# Patient Record
Sex: MALE | Race: WHITE | NOT HISPANIC OR LATINO | Employment: FULL TIME | URBAN - METROPOLITAN AREA
[De-identification: names, ages, dates, MRNs, and addresses within clinical notes are randomized per-mention and may not be internally consistent; named-entity substitution may affect disease eponyms.]

---

## 2019-12-11 ENCOUNTER — RECORDS - HEALTHEAST (OUTPATIENT)
Dept: LAB | Facility: CLINIC | Age: 34
End: 2019-12-11

## 2019-12-11 LAB
ALBUMIN SERPL-MCNC: 3.8 G/DL (ref 3.5–5)
ALP SERPL-CCNC: 128 U/L (ref 45–120)
ALT SERPL W P-5'-P-CCNC: 9 U/L (ref 0–45)
ANION GAP SERPL CALCULATED.3IONS-SCNC: 9 MMOL/L (ref 5–18)
AST SERPL W P-5'-P-CCNC: 17 U/L (ref 0–40)
BASOPHILS # BLD AUTO: 0.1 THOU/UL (ref 0–0.2)
BASOPHILS NFR BLD AUTO: 1 % (ref 0–2)
BILIRUB SERPL-MCNC: 0.3 MG/DL (ref 0–1)
BUN SERPL-MCNC: 12 MG/DL (ref 8–22)
CALCIUM SERPL-MCNC: 8.9 MG/DL (ref 8.5–10.5)
CHLORIDE BLD-SCNC: 107 MMOL/L (ref 98–107)
CHOLEST SERPL-MCNC: 145 MG/DL
CO2 SERPL-SCNC: 25 MMOL/L (ref 22–31)
CREAT SERPL-MCNC: 0.83 MG/DL (ref 0.7–1.3)
EOSINOPHIL # BLD AUTO: 0.2 THOU/UL (ref 0–0.4)
EOSINOPHIL NFR BLD AUTO: 3 % (ref 0–6)
ERYTHROCYTE [DISTWIDTH] IN BLOOD BY AUTOMATED COUNT: 14.1 % (ref 11–14.5)
FASTING STATUS PATIENT QL REPORTED: NO
GFR SERPL CREATININE-BSD FRML MDRD: >60 ML/MIN/1.73M2
GLUCOSE BLD-MCNC: 94 MG/DL (ref 70–125)
HCT VFR BLD AUTO: 40.6 % (ref 40–54)
HDLC SERPL-MCNC: 43 MG/DL
HGB BLD-MCNC: 12.4 G/DL (ref 14–18)
LDLC SERPL CALC-MCNC: 86 MG/DL
LYMPHOCYTES # BLD AUTO: 2.5 THOU/UL (ref 0.8–4.4)
LYMPHOCYTES NFR BLD AUTO: 34 % (ref 20–40)
MCH RBC QN AUTO: 25.9 PG (ref 27–34)
MCHC RBC AUTO-ENTMCNC: 30.5 G/DL (ref 32–36)
MCV RBC AUTO: 85 FL (ref 80–100)
MONOCYTES # BLD AUTO: 0.6 THOU/UL (ref 0–0.9)
MONOCYTES NFR BLD AUTO: 8 % (ref 2–10)
NEUTROPHILS # BLD AUTO: 4 THOU/UL (ref 2–7.7)
NEUTROPHILS NFR BLD AUTO: 54 % (ref 50–70)
PLATELET # BLD AUTO: 473 THOU/UL (ref 140–440)
PMV BLD AUTO: 10.1 FL (ref 8.5–12.5)
POTASSIUM BLD-SCNC: 4.1 MMOL/L (ref 3.5–5)
PROT SERPL-MCNC: 6.8 G/DL (ref 6–8)
RBC # BLD AUTO: 4.79 MILL/UL (ref 4.4–6.2)
SODIUM SERPL-SCNC: 141 MMOL/L (ref 136–145)
TRIGL SERPL-MCNC: 79 MG/DL
WBC: 7.4 THOU/UL (ref 4–11)

## 2023-02-16 ENCOUNTER — LAB REQUISITION (OUTPATIENT)
Dept: LAB | Facility: CLINIC | Age: 38
End: 2023-02-16

## 2023-02-16 LAB
ALBUMIN SERPL BCG-MCNC: 4 G/DL (ref 3.5–5.2)
ALBUMIN UR-MCNC: 20 MG/DL
ALP SERPL-CCNC: 107 U/L (ref 40–129)
ALT SERPL W P-5'-P-CCNC: 9 U/L (ref 10–50)
ANION GAP SERPL CALCULATED.3IONS-SCNC: 15 MMOL/L (ref 7–15)
APPEARANCE UR: CLEAR
AST SERPL W P-5'-P-CCNC: 13 U/L (ref 10–50)
BASOPHILS # BLD AUTO: 0.1 10E3/UL (ref 0–0.2)
BASOPHILS NFR BLD AUTO: 1 %
BILIRUB SERPL-MCNC: 0.3 MG/DL
BILIRUB UR QL STRIP: NEGATIVE
BUN SERPL-MCNC: 10.3 MG/DL (ref 6–20)
CALCIUM SERPL-MCNC: 9.2 MG/DL (ref 8.6–10)
CHLORIDE SERPL-SCNC: 98 MMOL/L (ref 98–107)
CHOLEST SERPL-MCNC: 142 MG/DL
COLOR UR AUTO: YELLOW
CREAT SERPL-MCNC: 1.09 MG/DL (ref 0.67–1.17)
DEPRECATED HCO3 PLAS-SCNC: 26 MMOL/L (ref 22–29)
EOSINOPHIL # BLD AUTO: 0.2 10E3/UL (ref 0–0.7)
EOSINOPHIL NFR BLD AUTO: 1 %
ERYTHROCYTE [DISTWIDTH] IN BLOOD BY AUTOMATED COUNT: 13.9 % (ref 10–15)
ERYTHROCYTE [SEDIMENTATION RATE] IN BLOOD BY WESTERGREN METHOD: 26 MM/HR (ref 0–15)
GFR SERPL CREATININE-BSD FRML MDRD: 90 ML/MIN/1.73M2
GLUCOSE SERPL-MCNC: 90 MG/DL (ref 70–99)
GLUCOSE UR STRIP-MCNC: NEGATIVE MG/DL
HCT VFR BLD AUTO: 39.6 % (ref 40–53)
HDLC SERPL-MCNC: 43 MG/DL
HGB BLD-MCNC: 12 G/DL (ref 13.3–17.7)
HGB UR QL STRIP: NEGATIVE
IMM GRANULOCYTES # BLD: 0.1 10E3/UL
IMM GRANULOCYTES NFR BLD: 0 %
KETONES UR STRIP-MCNC: NEGATIVE MG/DL
LDLC SERPL CALC-MCNC: 82 MG/DL
LEUKOCYTE ESTERASE UR QL STRIP: NEGATIVE
LYMPHOCYTES # BLD AUTO: 2.3 10E3/UL (ref 0.8–5.3)
LYMPHOCYTES NFR BLD AUTO: 18 %
MCH RBC QN AUTO: 25.1 PG (ref 26.5–33)
MCHC RBC AUTO-ENTMCNC: 30.3 G/DL (ref 31.5–36.5)
MCV RBC AUTO: 83 FL (ref 78–100)
MONOCYTES # BLD AUTO: 1 10E3/UL (ref 0–1.3)
MONOCYTES NFR BLD AUTO: 8 %
MUCOUS THREADS #/AREA URNS LPF: PRESENT /LPF
NEUTROPHILS # BLD AUTO: 9.3 10E3/UL (ref 1.6–8.3)
NEUTROPHILS NFR BLD AUTO: 72 %
NITRATE UR QL: NEGATIVE
NONHDLC SERPL-MCNC: 99 MG/DL
NRBC # BLD AUTO: 0 10E3/UL
NRBC BLD AUTO-RTO: 0 /100
PH UR STRIP: 5.5 [PH] (ref 5–7)
PLATELET # BLD AUTO: 542 10E3/UL (ref 150–450)
POTASSIUM SERPL-SCNC: 4.2 MMOL/L (ref 3.4–5.3)
PROT SERPL-MCNC: 6.6 G/DL (ref 6.4–8.3)
RBC # BLD AUTO: 4.78 10E6/UL (ref 4.4–5.9)
RBC URINE: 1 /HPF
SODIUM SERPL-SCNC: 139 MMOL/L (ref 136–145)
SP GR UR STRIP: 1.02 (ref 1–1.03)
TRANSITIONAL EPI: <1 /HPF
TRIGL SERPL-MCNC: 86 MG/DL
UROBILINOGEN UR STRIP-MCNC: NORMAL MG/DL
WBC # BLD AUTO: 12.9 10E3/UL (ref 4–11)
WBC URINE: <1 /HPF

## 2023-02-16 PROCEDURE — 85652 RBC SED RATE AUTOMATED: CPT | Performed by: NURSE PRACTITIONER

## 2023-02-16 PROCEDURE — 80061 LIPID PANEL: CPT | Performed by: NURSE PRACTITIONER

## 2023-02-16 PROCEDURE — 80053 COMPREHEN METABOLIC PANEL: CPT | Performed by: NURSE PRACTITIONER

## 2023-02-16 PROCEDURE — 85004 AUTOMATED DIFF WBC COUNT: CPT | Performed by: NURSE PRACTITIONER

## 2023-02-16 PROCEDURE — 81003 URINALYSIS AUTO W/O SCOPE: CPT | Performed by: NURSE PRACTITIONER

## 2023-02-20 ENCOUNTER — TRANSFERRED RECORDS (OUTPATIENT)
Dept: HEALTH INFORMATION MANAGEMENT | Facility: CLINIC | Age: 38
End: 2023-02-20
Payer: COMMERCIAL

## 2023-02-21 ENCOUNTER — HOSPITAL ENCOUNTER (INPATIENT)
Facility: CLINIC | Age: 38
LOS: 4 days | Discharge: HOME | End: 2023-02-25
Attending: EMERGENCY MEDICINE | Admitting: STUDENT IN AN ORGANIZED HEALTH CARE EDUCATION/TRAINING PROGRAM
Payer: COMMERCIAL

## 2023-02-21 DIAGNOSIS — K37 APPENDICITIS, UNSPECIFIED APPENDICITIS TYPE: Primary | ICD-10-CM

## 2023-02-21 DIAGNOSIS — K35.31 ACUTE APPENDICITIS WITH LOCALIZED PERITONITIS AND GANGRENE, WITHOUT PERFORATION OR ABSCESS: ICD-10-CM

## 2023-02-21 LAB
ABO/RH(D): NORMAL
ALBUMIN SERPL BCG-MCNC: 4.1 G/DL (ref 3.5–5.2)
ALBUMIN UR-MCNC: NEGATIVE MG/DL
ALP SERPL-CCNC: 116 U/L (ref 40–129)
ALT SERPL W P-5'-P-CCNC: 9 U/L (ref 10–50)
ANION GAP SERPL CALCULATED.3IONS-SCNC: 11 MMOL/L (ref 7–15)
ANTIBODY SCREEN: NEGATIVE
APPEARANCE UR: CLEAR
AST SERPL W P-5'-P-CCNC: 17 U/L (ref 10–50)
BASOPHILS # BLD AUTO: 0.1 10E3/UL (ref 0–0.2)
BASOPHILS NFR BLD AUTO: 1 %
BILIRUB SERPL-MCNC: 0.2 MG/DL
BILIRUB UR QL STRIP: NEGATIVE
BUN SERPL-MCNC: 10.7 MG/DL (ref 6–20)
CALCIUM SERPL-MCNC: 9.6 MG/DL (ref 8.6–10)
CHLORIDE SERPL-SCNC: 101 MMOL/L (ref 98–107)
COLOR UR AUTO: NORMAL
CREAT SERPL-MCNC: 1 MG/DL (ref 0.67–1.17)
DEPRECATED HCO3 PLAS-SCNC: 28 MMOL/L (ref 22–29)
EOSINOPHIL # BLD AUTO: 0.2 10E3/UL (ref 0–0.7)
EOSINOPHIL NFR BLD AUTO: 2 %
ERYTHROCYTE [DISTWIDTH] IN BLOOD BY AUTOMATED COUNT: 13.4 % (ref 10–15)
GFR SERPL CREATININE-BSD FRML MDRD: >90 ML/MIN/1.73M2
GLUCOSE SERPL-MCNC: 102 MG/DL (ref 70–99)
GLUCOSE UR STRIP-MCNC: NEGATIVE MG/DL
HCT VFR BLD AUTO: 42.3 % (ref 40–53)
HGB BLD-MCNC: 13 G/DL (ref 13.3–17.7)
HGB UR QL STRIP: NEGATIVE
IMM GRANULOCYTES # BLD: 0 10E3/UL
IMM GRANULOCYTES NFR BLD: 0 %
KETONES UR STRIP-MCNC: NEGATIVE MG/DL
LEUKOCYTE ESTERASE UR QL STRIP: NEGATIVE
LIPASE SERPL-CCNC: 21 U/L (ref 13–60)
LYMPHOCYTES # BLD AUTO: 1.7 10E3/UL (ref 0.8–5.3)
LYMPHOCYTES NFR BLD AUTO: 22 %
MCH RBC QN AUTO: 25 PG (ref 26.5–33)
MCHC RBC AUTO-ENTMCNC: 30.7 G/DL (ref 31.5–36.5)
MCV RBC AUTO: 81 FL (ref 78–100)
MONOCYTES # BLD AUTO: 0.6 10E3/UL (ref 0–1.3)
MONOCYTES NFR BLD AUTO: 8 %
NEUTROPHILS # BLD AUTO: 5.1 10E3/UL (ref 1.6–8.3)
NEUTROPHILS NFR BLD AUTO: 67 %
NITRATE UR QL: NEGATIVE
NRBC # BLD AUTO: 0 10E3/UL
NRBC BLD AUTO-RTO: 0 /100
PH UR STRIP: 6.5 [PH] (ref 5–7)
PLATELET # BLD AUTO: 633 10E3/UL (ref 150–450)
POTASSIUM SERPL-SCNC: 4.8 MMOL/L (ref 3.4–5.3)
PROT SERPL-MCNC: 7.5 G/DL (ref 6.4–8.3)
RBC # BLD AUTO: 5.2 10E6/UL (ref 4.4–5.9)
RBC URINE: <1 /HPF
SODIUM SERPL-SCNC: 140 MMOL/L (ref 136–145)
SP GR UR STRIP: 1.02 (ref 1–1.03)
SPECIMEN EXPIRATION DATE: NORMAL
UROBILINOGEN UR STRIP-MCNC: NORMAL MG/DL
WBC # BLD AUTO: 7.6 10E3/UL (ref 4–11)
WBC URINE: 2 /HPF

## 2023-02-21 PROCEDURE — 99285 EMERGENCY DEPT VISIT HI MDM: CPT | Mod: 25 | Performed by: EMERGENCY MEDICINE

## 2023-02-21 PROCEDURE — 36415 COLL VENOUS BLD VENIPUNCTURE: CPT

## 2023-02-21 PROCEDURE — 258N000003 HC RX IP 258 OP 636: Performed by: EMERGENCY MEDICINE

## 2023-02-21 PROCEDURE — 83690 ASSAY OF LIPASE: CPT | Performed by: EMERGENCY MEDICINE

## 2023-02-21 PROCEDURE — 80053 COMPREHEN METABOLIC PANEL: CPT | Performed by: EMERGENCY MEDICINE

## 2023-02-21 PROCEDURE — 250N000011 HC RX IP 250 OP 636: Performed by: EMERGENCY MEDICINE

## 2023-02-21 PROCEDURE — 250N000011 HC RX IP 250 OP 636

## 2023-02-21 PROCEDURE — 96361 HYDRATE IV INFUSION ADD-ON: CPT | Performed by: EMERGENCY MEDICINE

## 2023-02-21 PROCEDURE — 81001 URINALYSIS AUTO W/SCOPE: CPT | Performed by: EMERGENCY MEDICINE

## 2023-02-21 PROCEDURE — 86901 BLOOD TYPING SEROLOGIC RH(D): CPT | Performed by: SURGERY

## 2023-02-21 PROCEDURE — 96374 THER/PROPH/DIAG INJ IV PUSH: CPT | Performed by: EMERGENCY MEDICINE

## 2023-02-21 PROCEDURE — 85025 COMPLETE CBC W/AUTO DIFF WBC: CPT | Performed by: EMERGENCY MEDICINE

## 2023-02-21 PROCEDURE — 36415 COLL VENOUS BLD VENIPUNCTURE: CPT | Performed by: EMERGENCY MEDICINE

## 2023-02-21 PROCEDURE — 250N000013 HC RX MED GY IP 250 OP 250 PS 637

## 2023-02-21 PROCEDURE — 99285 EMERGENCY DEPT VISIT HI MDM: CPT | Performed by: EMERGENCY MEDICINE

## 2023-02-21 PROCEDURE — 258N000003 HC RX IP 258 OP 636

## 2023-02-21 PROCEDURE — 120N000002 HC R&B MED SURG/OB UMMC

## 2023-02-21 RX ORDER — AMOXICILLIN 250 MG
2 CAPSULE ORAL 2 TIMES DAILY
Status: DISCONTINUED | OUTPATIENT
Start: 2023-02-21 | End: 2023-02-25 | Stop reason: HOSPADM

## 2023-02-21 RX ORDER — LIDOCAINE 40 MG/G
CREAM TOPICAL
Status: DISCONTINUED | OUTPATIENT
Start: 2023-02-21 | End: 2023-02-22

## 2023-02-21 RX ORDER — MORPHINE SULFATE 4 MG/ML
4 INJECTION, SOLUTION INTRAMUSCULAR; INTRAVENOUS
Status: DISCONTINUED | OUTPATIENT
Start: 2023-02-21 | End: 2023-02-21

## 2023-02-21 RX ORDER — AMOXICILLIN 250 MG
1 CAPSULE ORAL 2 TIMES DAILY
Status: DISCONTINUED | OUTPATIENT
Start: 2023-02-21 | End: 2023-02-25 | Stop reason: HOSPADM

## 2023-02-21 RX ORDER — ONDANSETRON 2 MG/ML
4 INJECTION INTRAMUSCULAR; INTRAVENOUS EVERY 30 MIN PRN
Status: DISCONTINUED | OUTPATIENT
Start: 2023-02-21 | End: 2023-02-21

## 2023-02-21 RX ORDER — OXYCODONE HYDROCHLORIDE 5 MG/1
5 TABLET ORAL EVERY 4 HOURS PRN
Status: DISCONTINUED | OUTPATIENT
Start: 2023-02-21 | End: 2023-02-24

## 2023-02-21 RX ORDER — ACETAMINOPHEN 650 MG/1
650 SUPPOSITORY RECTAL EVERY 6 HOURS PRN
Status: DISCONTINUED | OUTPATIENT
Start: 2023-02-21 | End: 2023-02-22

## 2023-02-21 RX ORDER — ONDANSETRON 2 MG/ML
4 INJECTION INTRAMUSCULAR; INTRAVENOUS EVERY 6 HOURS PRN
Status: DISCONTINUED | OUTPATIENT
Start: 2023-02-21 | End: 2023-02-25 | Stop reason: HOSPADM

## 2023-02-21 RX ORDER — PROCHLORPERAZINE 25 MG
25 SUPPOSITORY, RECTAL RECTAL EVERY 12 HOURS PRN
Status: DISCONTINUED | OUTPATIENT
Start: 2023-02-21 | End: 2023-02-25 | Stop reason: HOSPADM

## 2023-02-21 RX ORDER — SODIUM CHLORIDE, SODIUM LACTATE, POTASSIUM CHLORIDE, CALCIUM CHLORIDE 600; 310; 30; 20 MG/100ML; MG/100ML; MG/100ML; MG/100ML
INJECTION, SOLUTION INTRAVENOUS CONTINUOUS
Status: DISCONTINUED | OUTPATIENT
Start: 2023-02-21 | End: 2023-02-23

## 2023-02-21 RX ORDER — ONDANSETRON 4 MG/1
4 TABLET, ORALLY DISINTEGRATING ORAL EVERY 6 HOURS PRN
Status: DISCONTINUED | OUTPATIENT
Start: 2023-02-21 | End: 2023-02-25 | Stop reason: HOSPADM

## 2023-02-21 RX ORDER — ACETAMINOPHEN 325 MG/1
650 TABLET ORAL EVERY 6 HOURS PRN
Status: DISCONTINUED | OUTPATIENT
Start: 2023-02-21 | End: 2023-02-22

## 2023-02-21 RX ORDER — CEFOXITIN 2 G/1
2 INJECTION, POWDER, FOR SOLUTION INTRAVENOUS EVERY 6 HOURS
Status: DISCONTINUED | OUTPATIENT
Start: 2023-02-21 | End: 2023-02-22

## 2023-02-21 RX ORDER — SODIUM CHLORIDE 9 MG/ML
INJECTION, SOLUTION INTRAVENOUS CONTINUOUS
Status: DISCONTINUED | OUTPATIENT
Start: 2023-02-21 | End: 2023-02-21

## 2023-02-21 RX ORDER — PROCHLORPERAZINE MALEATE 5 MG
10 TABLET ORAL EVERY 6 HOURS PRN
Status: DISCONTINUED | OUTPATIENT
Start: 2023-02-21 | End: 2023-02-25 | Stop reason: HOSPADM

## 2023-02-21 RX ADMIN — SODIUM CHLORIDE, POTASSIUM CHLORIDE, SODIUM LACTATE AND CALCIUM CHLORIDE: 600; 310; 30; 20 INJECTION, SOLUTION INTRAVENOUS at 15:27

## 2023-02-21 RX ADMIN — SODIUM CHLORIDE: 0.9 INJECTION, SOLUTION INTRAVENOUS at 13:27

## 2023-02-21 RX ADMIN — CEFOXITIN SODIUM 2 G: 2 POWDER, FOR SOLUTION INTRAVENOUS at 16:32

## 2023-02-21 RX ADMIN — ACETAMINOPHEN 650 MG: 325 TABLET ORAL at 22:02

## 2023-02-21 RX ADMIN — ONDANSETRON 4 MG: 2 INJECTION INTRAMUSCULAR; INTRAVENOUS at 11:47

## 2023-02-21 RX ADMIN — SODIUM CHLORIDE 1000 ML: 9 INJECTION, SOLUTION INTRAVENOUS at 11:46

## 2023-02-21 RX ADMIN — CEFOXITIN SODIUM 2 G: 2 POWDER, FOR SOLUTION INTRAVENOUS at 23:00

## 2023-02-21 ASSESSMENT — ACTIVITIES OF DAILY LIVING (ADL)
ADLS_ACUITY_SCORE: 35

## 2023-02-21 NOTE — H&P
[facility]    Consult Note - General Surgery Service  Date of Admission:  2/21/2023  Consult Requested by: ED  Reason for Consult: Abdominal pain    Assessment & Plan: Surgery   Karlos Terrazas is a 37 year old male otherwise healthy with acute appendicitis.     Patient was counselled on the various treatment options for appendicitis including surgery + antibiotics, antibiotic therapy alone, or conservative measures, including the risks and benefits of each. The patient expressed understanding and wished to proceed with surgical management.     - Admit to general surgery  - Board and consent for Lap Appy for 2/22.  - IV Abx (Cefoxitin)  - IVF  - NPO after midnight  - Reassess patient in the AM.        Drains: None     Code Status: Full Code      Clinically Significant Risk Factors Present on Admission                             The patient's care was discussed with the Attending Physician, Dr. Miranda and Chief Resident/Fellow.    Lázaro Strange MD  [facility]  Non-urgent messages: Securely message with [employer] (more info)  Text page via [employer] Paging/Directory     ______________________________________________________________________    Chief Complaint   Abdominal pain    History is obtained from the patient, electronic health record and emergency department physician    History of Present Illness   Karlos Terrazas is a 37 year old male otherwise healthy presenting with ~5 days of mid abdominal pain that migrated to his RLQ on 2/19. Patient initially presented to his PCP that day who obtained workup demonstrating mild leukocytosis and scheduled outpatient CT A/P with results demonstrating acute appendicitis. Patient was the encouraged to present to the ED for further evaluation.     Today, he continues to have mild RLQ abdominal pain that waxes and wanes, notably improved if he is not eating. Notes some chills but no fever.  No CP or SOB. Endorses nausea, but no vomiting. No changes in bowel or bladder habits.     Given his CT findings from 2/20, general surgery was asked to evaluate the patient for possible acute appendicitis.         Past Medical History    History reviewed. No pertinent past medical history.    Past Surgical History   History reviewed. No pertinent surgical history.    Prior to Admission Medications   I have reviewed this patient's current medications       Review of Systems    The 10 point Review of Systems is negative other than noted in the HPI or here.     Social History   I have reviewed this patient's social history and updated it with pertinent information if needed.  Social History     Tobacco Use     Smoking status: Never     Smokeless tobacco: Never   Substance Use Topics     Alcohol use: Yes     Comment: occasional use     Drug use: Yes     Types: Marijuana       Family History   No significant family history, including no history of: IBS, Ulcerative colitis, or crohn's.     Allergies   No Known Allergies     Physical Exam   Vital Signs: Temp: 98.3  F (36.8  C) Temp src: Oral BP: 117/78 Pulse: 70   Resp: 14 SpO2: 100 % O2 Device: None (Room air)    Weight: 0 lbs 0 oz    Intake/Output Summary (Last 24 hours) at 2/21/2023 1654  Last data filed at 2/21/2023 1600  Gross per 24 hour   Intake 517.08 ml   Output --   Net 517.08 ml     General: Alert, resting comfortably in NAD/NTA. Cooperative  HEENT: NC/AT, sclerae anicteric.Oral mucosa moist  Neck: Trachea midline  Pulm: NLB on RA, no tachypnea/dyspnea  CV: RRR by RP, non-cyanotic, no LE edema.  ABD: Soft, Thin, non-tender, non-distended, no guarding or rebound tenderness. No obvious organomegaly.   Extrem: MA4E, no obvious deformities  Neuro:  A/Ox3, NFD,   Skin: Warm and well perfused    Data     I have personally reviewed the following data over the past 24 hrs:    7.6  \   13.0 (L)   / 633 (H)     140 101 10.7 /  102 (H)   4.8 28 1.00 \       ALT: 9 (L)  AST: 17 AP: 116 TBILI: 0.2   ALB: 4.1 TOT PROTEIN: 7.5 LIPASE: 21       Imaging results reviewed over the past 24 hrs:   Patient's CT A/P from 2/20 unavailable - read was discussed with [facility] resident and attending who confirmed the inflammation of the appendix w/ dilation to ~1 cm

## 2023-02-21 NOTE — ED TRIAGE NOTES
Pt reports being seen at [facility] yesterday and having CT scan done, was told today he has Acute Appendicitis. Pt reports periumbilical and RLQ pain for past 1.5 months but states worse since last Thursday. Mild nausea but no emesis, intermittant diarrhea.      Triage Assessment     Row Name 02/21/23 1053       Triage Assessment (Adult)    Airway WDL WDL       Respiratory WDL    Respiratory WDL WDL       Skin Circulation/Temperature WDL    Skin Circulation/Temperature WDL WDL       Cardiac WDL    Cardiac WDL WDL       Peripheral/Neurovascular WDL    Peripheral Neurovascular WDL WDL       Cognitive/Neuro/Behavioral WDL    Cognitive/Neuro/Behavioral WDL WDL

## 2023-02-21 NOTE — ED PROVIDER NOTES
"ED Provider Note  [facility]      History   No chief complaint on file.    The history is provided by the patient and medical records.     Karlos Terrazas is a relatively healthy 37 year old male who presents to the ED by recommendation of his PCP for evaluation of abdominal pain in the setting of CT verified appendicitis.  Patient reports having intermittent abdominal pain and distention since John.  On Thursday, 5 days ago, patient reported worsening pain in his abdomen, specifically his right lower quadrant and visited his PCP.  Labs and CT were ordered; CT was conducted yesterday at [facility] in [place] ([phone]) which was notable for \"acute appendicitis without perforation or abscess; thickening of the wall with trace fluid; some adenopathy in right lower quadrant abdomen.\"  Over the past several days the patient notes his pain has moved to different areas of his abdomen but states worst pain is  periumbilical.  His pain worsens with palpation.  Patient notes feeling slightly nauseous for the past several days and denies episodes of vomiting.  Patient endorses a decreased appetite.  Patient has been able to eat some food but less than what is typical for him.  Patient denies recent fevers.  He denies issues with urinating.  Patient last ate food yesterday.  He drank approximately 6 ounces of coffee in the a.m. and 6 ounces of milk at 10 AM this morning.  He denies a history of abdominal surgeries.  Patient denies recent antibiotics or immunosuppressants.    Past Medical History  History reviewed. No pertinent past medical history.  History reviewed. No pertinent surgical history.  No current outpatient medications on file.    No Known Allergies  Family History  History reviewed. No pertinent family history.  Social History   Social History     Tobacco Use     Smoking status: Never     Smokeless tobacco: Never   Substance Use Topics     Alcohol use: Yes     " Comment: occasional use     Drug use: Yes     Types: Marijuana      Past medical history, past surgical history, medications, allergies, family history, and social history were reviewed with the patient. No additional pertinent items.      A complete review of systems was performed with pertinent positives and negatives noted in the HPI, and all other systems negative.    Physical Exam      Physical Exam  Constitutional:       General: He is not in acute distress.     Appearance: He is well-developed. He is not ill-appearing, toxic-appearing or diaphoretic.   HENT:      Head: Normocephalic and atraumatic.   Cardiovascular:      Rate and Rhythm: Normal rate and regular rhythm.      Heart sounds: Normal heart sounds.   Pulmonary:      Effort: Pulmonary effort is normal. No respiratory distress.      Breath sounds: No wheezing.   Abdominal:      General: There is no distension.      Palpations: Abdomen is soft.      Tenderness: There is abdominal tenderness in the right lower quadrant and suprapubic area. There is no rebound.   Musculoskeletal:      Cervical back: Normal range of motion and neck supple.   Skin:     General: Skin is warm.   Neurological:      Mental Status: He is alert and oriented to person, place, and time.   Psychiatric:         Mood and Affect: Mood normal.         Behavior: Behavior normal.         Thought Content: Thought content normal.           ED Course, Procedures, & Data     11:21 AM  The patient was seen and examined by Dr. Thea Roman in Room [facility].     Procedures                      No results found for any visits on 02/21/23.  Medications - No data to display  Labs Ordered and Resulted from Time of ED Arrival to Time of ED Departure - No data to display  No orders to display              Medical Decision Making  The patient's presentation was of moderate complexity (an acute illness with systemic symptoms).    The patient's evaluation involved:  review of external note(s) from 1 sources  (see separate area of note for details)  ordering and/or review of 3+ test(s) in this encounter (see separate area of note for details)  review of 1 test result(s) ordered prior to this encounter (CT abd results from yesterday reveiwed )  discussion of management or test interpretation with another health professional (surgery team)    The patient's management necessitated high risk (a decision regarding emergency major procedure (went to the OR with operative service)) and high risk (a decision regarding hospitalization).      Assessment & Plan    Patient is a 37-year-old male who was sent to the ER for evaluation for concern for acute appendicitis.  Patient had worsening pain started 5 days ago.  Patient's labs are stable.  Patient's CT was reviewed from yesterday.  Patient's CT abdomen and pelvis confirms appendicitis.  Patient does have tenderness in right lower quadrant.  Patient was seen by the surgery team who recommends admission to their service with plan for appendectomy.    I have reviewed the nursing notes. I have reviewed the findings, diagnosis, plan and need for follow up with the patient.    New Prescriptions    No medications on file       Final diagnoses:   Acute appendicitis with localized peritonitis and gangrene, without perforation or abscess     I, Alvaro Mcdaniel, am serving as a trained medical scribe to document services personally performed by Thea Roman MD, based on the provider's statements to me.      IThea MD, was physically present and have reviewed and verified the accuracy of this note documented by Alvaro Mcdaniel.     [facility] EMERGENCY DEPARTMENT  2/21/2023     Thea Roman MD  02/21/23 0487

## 2023-02-22 ENCOUNTER — ANESTHESIA (OUTPATIENT)
Dept: SURGERY | Facility: CLINIC | Age: 38
End: 2023-02-22
Payer: COMMERCIAL

## 2023-02-22 ENCOUNTER — ANESTHESIA EVENT (OUTPATIENT)
Dept: SURGERY | Facility: CLINIC | Age: 38
End: 2023-02-22
Payer: COMMERCIAL

## 2023-02-22 LAB
ERYTHROCYTE [DISTWIDTH] IN BLOOD BY AUTOMATED COUNT: 13.3 % (ref 10–15)
ERYTHROCYTE [DISTWIDTH] IN BLOOD BY AUTOMATED COUNT: 13.4 % (ref 10–15)
GLUCOSE BLDC GLUCOMTR-MCNC: 77 MG/DL (ref 70–99)
HCT VFR BLD AUTO: 35.8 % (ref 40–53)
HCT VFR BLD AUTO: 37.6 % (ref 40–53)
HGB BLD-MCNC: 10.9 G/DL (ref 13.3–17.7)
HGB BLD-MCNC: 11.3 G/DL (ref 13.3–17.7)
MCH RBC QN AUTO: 24.7 PG (ref 26.5–33)
MCH RBC QN AUTO: 24.9 PG (ref 26.5–33)
MCHC RBC AUTO-ENTMCNC: 30.1 G/DL (ref 31.5–36.5)
MCHC RBC AUTO-ENTMCNC: 30.4 G/DL (ref 31.5–36.5)
MCV RBC AUTO: 82 FL (ref 78–100)
MCV RBC AUTO: 82 FL (ref 78–100)
PLATELET # BLD AUTO: 508 10E3/UL (ref 150–450)
PLATELET # BLD AUTO: 567 10E3/UL (ref 150–450)
RBC # BLD AUTO: 4.37 10E6/UL (ref 4.4–5.9)
RBC # BLD AUTO: 4.57 10E6/UL (ref 4.4–5.9)
WBC # BLD AUTO: 14.9 10E3/UL (ref 4–11)
WBC # BLD AUTO: 17.8 10E3/UL (ref 4–11)

## 2023-02-22 PROCEDURE — 250N000011 HC RX IP 250 OP 636

## 2023-02-22 PROCEDURE — 120N000002 HC R&B MED SURG/OB UMMC

## 2023-02-22 PROCEDURE — 0WJG4ZZ INSPECTION OF PERITONEAL CAVITY, PERCUTANEOUS ENDOSCOPIC APPROACH: ICD-10-PCS | Performed by: STUDENT IN AN ORGANIZED HEALTH CARE EDUCATION/TRAINING PROGRAM

## 2023-02-22 PROCEDURE — 999N000141 HC STATISTIC PRE-PROCEDURE NURSING ASSESSMENT: Performed by: STUDENT IN AN ORGANIZED HEALTH CARE EDUCATION/TRAINING PROGRAM

## 2023-02-22 PROCEDURE — 272N000001 HC OR GENERAL SUPPLY STERILE: Performed by: STUDENT IN AN ORGANIZED HEALTH CARE EDUCATION/TRAINING PROGRAM

## 2023-02-22 PROCEDURE — 250N000011 HC RX IP 250 OP 636: Performed by: ANESTHESIOLOGY

## 2023-02-22 PROCEDURE — 250N000025 HC SEVOFLURANE, PER MIN: Performed by: STUDENT IN AN ORGANIZED HEALTH CARE EDUCATION/TRAINING PROGRAM

## 2023-02-22 PROCEDURE — 710N000010 HC RECOVERY PHASE 1, LEVEL 2, PER MIN: Performed by: STUDENT IN AN ORGANIZED HEALTH CARE EDUCATION/TRAINING PROGRAM

## 2023-02-22 PROCEDURE — 250N000013 HC RX MED GY IP 250 OP 250 PS 637

## 2023-02-22 PROCEDURE — 36415 COLL VENOUS BLD VENIPUNCTURE: CPT

## 2023-02-22 PROCEDURE — 0W9G40Z DRAINAGE OF PERITONEAL CAVITY WITH DRAINAGE DEVICE, PERCUTANEOUS ENDOSCOPIC APPROACH: ICD-10-PCS | Performed by: STUDENT IN AN ORGANIZED HEALTH CARE EDUCATION/TRAINING PROGRAM

## 2023-02-22 PROCEDURE — 258N000003 HC RX IP 258 OP 636

## 2023-02-22 PROCEDURE — 44180 LAP ENTEROLYSIS: CPT | Mod: GC | Performed by: STUDENT IN AN ORGANIZED HEALTH CARE EDUCATION/TRAINING PROGRAM

## 2023-02-22 PROCEDURE — 85027 COMPLETE CBC AUTOMATED: CPT

## 2023-02-22 PROCEDURE — 250N000011 HC RX IP 250 OP 636: Performed by: STUDENT IN AN ORGANIZED HEALTH CARE EDUCATION/TRAINING PROGRAM

## 2023-02-22 PROCEDURE — 0DNJ4ZZ RELEASE APPENDIX, PERCUTANEOUS ENDOSCOPIC APPROACH: ICD-10-PCS | Performed by: STUDENT IN AN ORGANIZED HEALTH CARE EDUCATION/TRAINING PROGRAM

## 2023-02-22 PROCEDURE — 370N000017 HC ANESTHESIA TECHNICAL FEE, PER MIN: Performed by: STUDENT IN AN ORGANIZED HEALTH CARE EDUCATION/TRAINING PROGRAM

## 2023-02-22 PROCEDURE — 360N000076 HC SURGERY LEVEL 3, PER MIN: Performed by: STUDENT IN AN ORGANIZED HEALTH CARE EDUCATION/TRAINING PROGRAM

## 2023-02-22 PROCEDURE — 250N000009 HC RX 250: Performed by: ANESTHESIOLOGY

## 2023-02-22 PROCEDURE — 0W3P4ZZ CONTROL BLEEDING IN GASTROINTESTINAL TRACT, PERCUTANEOUS ENDOSCOPIC APPROACH: ICD-10-PCS | Performed by: STUDENT IN AN ORGANIZED HEALTH CARE EDUCATION/TRAINING PROGRAM

## 2023-02-22 RX ORDER — HYDROMORPHONE HCL IN WATER/PF 6 MG/30 ML
.2-.4 PATIENT CONTROLLED ANALGESIA SYRINGE INTRAVENOUS EVERY 4 HOURS PRN
Status: DISCONTINUED | OUTPATIENT
Start: 2023-02-22 | End: 2023-02-24

## 2023-02-22 RX ORDER — ONDANSETRON 2 MG/ML
4 INJECTION INTRAMUSCULAR; INTRAVENOUS EVERY 30 MIN PRN
Status: DISCONTINUED | OUTPATIENT
Start: 2023-02-22 | End: 2023-02-22 | Stop reason: HOSPADM

## 2023-02-22 RX ORDER — FENTANYL CITRATE 50 UG/ML
25 INJECTION, SOLUTION INTRAMUSCULAR; INTRAVENOUS EVERY 5 MIN PRN
Status: DISCONTINUED | OUTPATIENT
Start: 2023-02-22 | End: 2023-02-22 | Stop reason: HOSPADM

## 2023-02-22 RX ORDER — CEFTRIAXONE 1 G/1
1 INJECTION, POWDER, FOR SOLUTION INTRAMUSCULAR; INTRAVENOUS EVERY 24 HOURS
Status: DISCONTINUED | OUTPATIENT
Start: 2023-02-22 | End: 2023-02-22

## 2023-02-22 RX ORDER — ONDANSETRON 4 MG/1
4 TABLET, ORALLY DISINTEGRATING ORAL EVERY 30 MIN PRN
Status: DISCONTINUED | OUTPATIENT
Start: 2023-02-22 | End: 2023-02-22 | Stop reason: HOSPADM

## 2023-02-22 RX ORDER — ACETAMINOPHEN 325 MG/1
975 TABLET ORAL EVERY 4 HOURS PRN
Status: DISCONTINUED | OUTPATIENT
Start: 2023-02-22 | End: 2023-02-23

## 2023-02-22 RX ORDER — METRONIDAZOLE 500 MG/100ML
500 INJECTION, SOLUTION INTRAVENOUS EVERY 8 HOURS
Status: DISCONTINUED | OUTPATIENT
Start: 2023-02-22 | End: 2023-02-24

## 2023-02-22 RX ORDER — LIDOCAINE HYDROCHLORIDE 20 MG/ML
INJECTION, SOLUTION INFILTRATION; PERINEURAL PRN
Status: DISCONTINUED | OUTPATIENT
Start: 2023-02-22 | End: 2023-02-22

## 2023-02-22 RX ORDER — CEFTRIAXONE 2 G/1
2 INJECTION, POWDER, FOR SOLUTION INTRAMUSCULAR; INTRAVENOUS EVERY 24 HOURS
Status: DISCONTINUED | OUTPATIENT
Start: 2023-02-22 | End: 2023-02-24

## 2023-02-22 RX ORDER — LIDOCAINE 40 MG/G
CREAM TOPICAL
Status: DISCONTINUED | OUTPATIENT
Start: 2023-02-22 | End: 2023-02-25 | Stop reason: HOSPADM

## 2023-02-22 RX ORDER — FENTANYL CITRATE 50 UG/ML
INJECTION, SOLUTION INTRAMUSCULAR; INTRAVENOUS PRN
Status: DISCONTINUED | OUTPATIENT
Start: 2023-02-22 | End: 2023-02-22

## 2023-02-22 RX ORDER — FENTANYL CITRATE 50 UG/ML
50 INJECTION, SOLUTION INTRAMUSCULAR; INTRAVENOUS EVERY 5 MIN PRN
Status: DISCONTINUED | OUTPATIENT
Start: 2023-02-22 | End: 2023-02-22 | Stop reason: HOSPADM

## 2023-02-22 RX ORDER — NALOXONE HYDROCHLORIDE 0.4 MG/ML
0.4 INJECTION, SOLUTION INTRAMUSCULAR; INTRAVENOUS; SUBCUTANEOUS
Status: DISCONTINUED | OUTPATIENT
Start: 2023-02-22 | End: 2023-02-25 | Stop reason: HOSPADM

## 2023-02-22 RX ORDER — HYDROMORPHONE HCL IN WATER/PF 6 MG/30 ML
0.2 PATIENT CONTROLLED ANALGESIA SYRINGE INTRAVENOUS EVERY 5 MIN PRN
Status: DISCONTINUED | OUTPATIENT
Start: 2023-02-22 | End: 2023-02-22 | Stop reason: HOSPADM

## 2023-02-22 RX ORDER — BUPIVACAINE HYDROCHLORIDE 2.5 MG/ML
INJECTION, SOLUTION EPIDURAL; INFILTRATION; INTRACAUDAL PRN
Status: DISCONTINUED | OUTPATIENT
Start: 2023-02-22 | End: 2023-02-22 | Stop reason: HOSPADM

## 2023-02-22 RX ORDER — HYDROMORPHONE HCL IN WATER/PF 6 MG/30 ML
0.4 PATIENT CONTROLLED ANALGESIA SYRINGE INTRAVENOUS EVERY 5 MIN PRN
Status: DISCONTINUED | OUTPATIENT
Start: 2023-02-22 | End: 2023-02-22 | Stop reason: HOSPADM

## 2023-02-22 RX ORDER — SODIUM CHLORIDE, SODIUM LACTATE, POTASSIUM CHLORIDE, CALCIUM CHLORIDE 600; 310; 30; 20 MG/100ML; MG/100ML; MG/100ML; MG/100ML
INJECTION, SOLUTION INTRAVENOUS CONTINUOUS
Status: DISCONTINUED | OUTPATIENT
Start: 2023-02-22 | End: 2023-02-22 | Stop reason: HOSPADM

## 2023-02-22 RX ORDER — LIDOCAINE 4 G/G
1 PATCH TOPICAL
Status: DISCONTINUED | OUTPATIENT
Start: 2023-02-22 | End: 2023-02-25 | Stop reason: HOSPADM

## 2023-02-22 RX ORDER — CEFAZOLIN SODIUM 1 G/3ML
INJECTION, POWDER, FOR SOLUTION INTRAMUSCULAR; INTRAVENOUS PRN
Status: DISCONTINUED | OUTPATIENT
Start: 2023-02-22 | End: 2023-02-22

## 2023-02-22 RX ORDER — ONDANSETRON 2 MG/ML
INJECTION INTRAMUSCULAR; INTRAVENOUS PRN
Status: DISCONTINUED | OUTPATIENT
Start: 2023-02-22 | End: 2023-02-22

## 2023-02-22 RX ORDER — DEXAMETHASONE SODIUM PHOSPHATE 4 MG/ML
INJECTION, SOLUTION INTRA-ARTICULAR; INTRALESIONAL; INTRAMUSCULAR; INTRAVENOUS; SOFT TISSUE PRN
Status: DISCONTINUED | OUTPATIENT
Start: 2023-02-22 | End: 2023-02-22

## 2023-02-22 RX ORDER — NALOXONE HYDROCHLORIDE 0.4 MG/ML
0.2 INJECTION, SOLUTION INTRAMUSCULAR; INTRAVENOUS; SUBCUTANEOUS
Status: DISCONTINUED | OUTPATIENT
Start: 2023-02-22 | End: 2023-02-25 | Stop reason: HOSPADM

## 2023-02-22 RX ORDER — METHOCARBAMOL 500 MG/1
500 TABLET, FILM COATED ORAL 3 TIMES DAILY PRN
Status: DISCONTINUED | OUTPATIENT
Start: 2023-02-22 | End: 2023-02-25 | Stop reason: HOSPADM

## 2023-02-22 RX ORDER — ACETAMINOPHEN 650 MG/1
650 SUPPOSITORY RECTAL EVERY 6 HOURS PRN
Status: DISCONTINUED | OUTPATIENT
Start: 2023-02-22 | End: 2023-02-23

## 2023-02-22 RX ORDER — PROPOFOL 10 MG/ML
INJECTION, EMULSION INTRAVENOUS PRN
Status: DISCONTINUED | OUTPATIENT
Start: 2023-02-22 | End: 2023-02-22

## 2023-02-22 RX ADMIN — FENTANYL CITRATE 50 MCG: 50 INJECTION, SOLUTION INTRAMUSCULAR; INTRAVENOUS at 10:39

## 2023-02-22 RX ADMIN — METRONIDAZOLE 500 MG: 500 INJECTION, SOLUTION INTRAVENOUS at 11:49

## 2023-02-22 RX ADMIN — LIDOCAINE PATCH 4% 1 PATCH: 40 PATCH TOPICAL at 23:02

## 2023-02-22 RX ADMIN — HYDROMORPHONE HYDROCHLORIDE 0.4 MG: 0.2 INJECTION, SOLUTION INTRAMUSCULAR; INTRAVENOUS; SUBCUTANEOUS at 11:47

## 2023-02-22 RX ADMIN — FENTANYL CITRATE 50 MCG: 50 INJECTION, SOLUTION INTRAMUSCULAR; INTRAVENOUS at 07:56

## 2023-02-22 RX ADMIN — LIDOCAINE HYDROCHLORIDE 60 MG: 20 INJECTION, SOLUTION INFILTRATION; PERINEURAL at 07:36

## 2023-02-22 RX ADMIN — SUGAMMADEX 200 MG: 100 INJECTION, SOLUTION INTRAVENOUS at 09:46

## 2023-02-22 RX ADMIN — CEFTRIAXONE SODIUM 2 G: 2 INJECTION, POWDER, FOR SOLUTION INTRAMUSCULAR; INTRAVENOUS at 13:49

## 2023-02-22 RX ADMIN — ONDANSETRON 4 MG: 2 INJECTION INTRAMUSCULAR; INTRAVENOUS at 14:05

## 2023-02-22 RX ADMIN — SODIUM CHLORIDE, POTASSIUM CHLORIDE, SODIUM LACTATE AND CALCIUM CHLORIDE: 600; 310; 30; 20 INJECTION, SOLUTION INTRAVENOUS at 08:20

## 2023-02-22 RX ADMIN — SODIUM CHLORIDE, POTASSIUM CHLORIDE, SODIUM LACTATE AND CALCIUM CHLORIDE: 600; 310; 30; 20 INJECTION, SOLUTION INTRAVENOUS at 16:59

## 2023-02-22 RX ADMIN — Medication 10 MG: at 07:56

## 2023-02-22 RX ADMIN — Medication 50 MG: at 07:36

## 2023-02-22 RX ADMIN — ACETAMINOPHEN 650 MG: 325 TABLET ORAL at 12:35

## 2023-02-22 RX ADMIN — Medication 10 MG: at 08:43

## 2023-02-22 RX ADMIN — ONDANSETRON 4 MG: 2 INJECTION INTRAMUSCULAR; INTRAVENOUS at 09:27

## 2023-02-22 RX ADMIN — Medication 10 MG: at 08:21

## 2023-02-22 RX ADMIN — DEXAMETHASONE SODIUM PHOSPHATE 10 MG: 4 INJECTION, SOLUTION INTRA-ARTICULAR; INTRALESIONAL; INTRAMUSCULAR; INTRAVENOUS; SOFT TISSUE at 07:53

## 2023-02-22 RX ADMIN — ACETAMINOPHEN 975 MG: 325 TABLET ORAL at 23:01

## 2023-02-22 RX ADMIN — SODIUM CHLORIDE, POTASSIUM CHLORIDE, SODIUM LACTATE AND CALCIUM CHLORIDE: 600; 310; 30; 20 INJECTION, SOLUTION INTRAVENOUS at 02:45

## 2023-02-22 RX ADMIN — METHOCARBAMOL 500 MG: 500 TABLET ORAL at 18:26

## 2023-02-22 RX ADMIN — MIDAZOLAM 2 MG: 1 INJECTION INTRAMUSCULAR; INTRAVENOUS at 07:17

## 2023-02-22 RX ADMIN — ACETAMINOPHEN 650 MG: 325 TABLET ORAL at 18:26

## 2023-02-22 RX ADMIN — FENTANYL CITRATE 50 MCG: 50 INJECTION, SOLUTION INTRAMUSCULAR; INTRAVENOUS at 07:35

## 2023-02-22 RX ADMIN — OXYCODONE HYDROCHLORIDE 5 MG: 5 TABLET ORAL at 12:35

## 2023-02-22 RX ADMIN — CEFOXITIN SODIUM 2 G: 2 POWDER, FOR SOLUTION INTRAVENOUS at 06:07

## 2023-02-22 RX ADMIN — PROPOFOL 120 MG: 10 INJECTION, EMULSION INTRAVENOUS at 07:36

## 2023-02-22 RX ADMIN — FENTANYL CITRATE 50 MCG: 50 INJECTION, SOLUTION INTRAMUSCULAR; INTRAVENOUS at 08:21

## 2023-02-22 RX ADMIN — CEFAZOLIN 2 G: 1 INJECTION, POWDER, FOR SOLUTION INTRAMUSCULAR; INTRAVENOUS at 07:31

## 2023-02-22 RX ADMIN — METRONIDAZOLE 500 MG: 500 INJECTION, SOLUTION INTRAVENOUS at 20:44

## 2023-02-22 ASSESSMENT — ACTIVITIES OF DAILY LIVING (ADL)
ADLS_ACUITY_SCORE: 35
ADLS_ACUITY_SCORE: 39
ADLS_ACUITY_SCORE: 35
ADLS_ACUITY_SCORE: 39
ADLS_ACUITY_SCORE: 35
ADLS_ACUITY_SCORE: 35
ADLS_ACUITY_SCORE: 39
ADLS_ACUITY_SCORE: 35
ADLS_ACUITY_SCORE: 39
ADLS_ACUITY_SCORE: 35

## 2023-02-22 NOTE — BRIEF OP NOTE
[facility]    Brief Operative Note    Pre-operative diagnosis: Appendicitis, unspecified appendicitis type [K37]  Post-operative diagnosis Same as pre-operative diagnosis    Procedure: Procedure(s):  DIAGNOSTIC LAPAROSCOPY, LYSIS OF ADHESION, DRAIN PLACEMENT  Surgeon: Surgeon(s) and Role:     * Fabiola Miranda MD - Primary     * Shantell Anguiano MD - Resident - Assisting  Lázaro Strange MD - Resident - Assisting  Anesthesia: General   Estimated Blood Loss: Less than 50 ml    Drains: Indra-White  Specimens: * No specimens in log *  Findings:   Significant entrapment of the appendectomy posterior to the terminal ileum. Some bleeding was encountered from the ileal mesentery though this was controlled with hitesh and pressure. Unable to safely expose the appendix safely for removal. OLEKSANDR drain was placedin the RLQ and the case was aborted. .  Complications: Unanticipated bleeding from ileal mesentery which was controled with pressure and hitesh.  This was nottreated with transfusion of PRBC's.  Implants: * No implants in log *    Please page if questions,    Lázaro Strange MD, MS  PGY-2, General Surgery

## 2023-02-22 NOTE — PLAN OF CARE
Discussed with patient the current findings and explained the importance of having an appendectomy given his clinical and imaging findings and physical exam.     Patient was also given the ACS Appendectomy packet that illustrated pictures for additional understanding/guidance.     Non operative measures were offered and described to the patient, but they declined that approach.    I have also informed the patient of the potential for the inability to perform the intended operation and proceed with any of the following: open appendectomy, or only diagnostic laparoscopy with aborted appendectomy.    Informed the patient of the risks of operation that include but are not limited to:  Bleeding, deep vein thrombosis, seroma formation, chronic pain, pain/opioid medicine addiction (heroin addiction), misdiagnosis, possible drain placement, post operative pain, scar formation, infection, hernia formation, bladder or urachus injury, superficial or deep or intra abdominal abscess formation at or near any organs, possible hernia formation, need for drain placement, need for additional scans, staple dehiscence, suture tear, stump appendicitis in the future, conversion to open operation, fistula formation, need for extended resection of small bowel and/or cecum or right colon, leaks from staple lines or repair lines or ligation sites, need for additional surgeries, possible cancer diagnosis, possible return to the operating room emergently, Inflammatory Bowel Disease flare, increased or decreased risk for CD or UC, small and large bowel perforation/injury that may result in further complications and possible colostomy/ileostomy creation, ureter injury that would cause problems/necrosis of the ipsilateral kidney, large arterial injury (such as external iliac artery or vein) that would compromise his vasculature to that extremity which could result in morbid complications, strokes, brain anoxia, heart attack/myocardial  infarction, pneumonia, death from operation/anesthesia or from previously stated complications, etc.    I have also provided and explained to the patient other alternatives to surgical intervention.     Patient was allowed and questioned if they had any inquiry, questions, or concerns that they would to bring up or address, all of which were answered respectfully.     Patient voiced understanding of all this, potential for additional unlisted complications, and wished to proceed with intended operation.     Fabiola Miranda MD

## 2023-02-22 NOTE — ANESTHESIA POSTPROCEDURE EVALUATION
Patient: Karlos Terrazas    Procedure: Procedure(s):  DIAGNOSTIC LAPAROSCOPY, LYSIS OF ADHESION, DRAIN PLACEMENT       Anesthesia Type:  General    Note:  Disposition: Inpatient   Postop Pain Control: Uneventful            Sign Out: Well controlled pain   PONV: No   Neuro/Psych: Uneventful            Sign Out: Acceptable/Baseline neuro status   Airway/Respiratory: Uneventful            Sign Out: Acceptable/Baseline resp. status   CV/Hemodynamics: Uneventful            Sign Out: Acceptable CV status; No obvious hypovolemia; No obvious fluid overload   Other NRE:    DID A NON-ROUTINE EVENT OCCUR?            Last vitals:  Vitals Value Taken Time   /76 02/22/23 1115   Temp 36.7  C (98.1  F) 02/22/23 1050   Pulse 92 02/22/23 1110   Resp 13 02/22/23 1100   SpO2 97 % 02/22/23 1118   Vitals shown include unvalidated device data.    Electronically Signed By: Madonna Rojas MD  February 22, 2023  11:24 AM

## 2023-02-22 NOTE — PLAN OF CARE
Pt vss and A&Ox4. Pain tolerated with IV Dilaudid, Tylenol and Oxycodone. Pt stood and marched in place at bedside. Talavera removed downstairs, and pt is now voiding spontaneously. Pt is not passing gas. Pt had intermittent nausea that was relieved with IV Zofran. On a clear liquid diet. Abdominal lap sites x3 are clean, dry, intact, and open to air. OLEKSANDR to bulb suction. Pt has 2 PIVs- 1 is infusing LR at 100 mL/hr and the other is infusing NS at TKO. Skin assessment completed.

## 2023-02-22 NOTE — OR NURSING
"Karlos Terrazas was escorted to Pre-op by transporter and very knowable of his health situation and is prepared for surgery.  Very pleasant thin, healthy young man who has forced warm air applied as he is cool and slightly mottled.  Pt affirms the warm air \"feels good\".  Awaiting anesthesia and surgeon.  "

## 2023-02-22 NOTE — UTILIZATION REVIEW
Admission Status; Secondary Review Determination    Under the authority of the Utilization Management Committee, the utilization review process indicated a secondary review on the above patient. The review outcome is based on review of the medical records, discussions with staff, and applying clinical experience noted on the date of the review.    (x) Inpatient Status Appropriate - This patient's medical care is consistent with medical management for inpatient care and reasonable inpatient medical practice.    RATIONALE FOR DETERMINATION: 37-year-old male presented to hospital with a history of intermittent abdominal pain and distention since Christmas that significantly worsened 5 days prior to admission involving the right lower quadrant and periumbilical area.  CT imaging on 2/20/2022 revealed acute appendicitis without perforation or abscess.  Patient was referred to hospital on 2/21, started on IV fluids and IV antibiotics with pain control with planned surgical intervention on the morning of 2/22/2023.  Intraoperative findings revealed severe amounts of inflammation and subsequent bleeding in the right lower quadrant with the appendix nonperforated but heavily adhesed and inflamed to the small intestine and cecum.  Unable to mobilize appendix safely due to the adhesions as well as bleeding from raw inflamed areas.  Patient thus will require drain placement and significant course of IV antibiotics appropriate for inpatient care.    At the time of admission with the information available to the attending physician more than 2 nights Hospital complex care was anticipated, based on patient risk of adverse outcome if treated as outpatient and complex care required. Inpatient admission is appropriate based on the Medicare guidelines.    This document was produced using voice recognition software    The information on this document is developed by the utilization review team in order for the business office to  ensure compliance. This only denotes the appropriateness of proper admission status and does not reflect the quality of care rendered.    The definitions of Inpatient Status and Observation Status used in making the determination above are those provided in the CMS Coverage Manual, Chapter 1 and Chapter 6, section 70.4.    Sincerely,    Hollis Luis MD  Utilization Review  Physician Advisor  [facility].

## 2023-02-22 NOTE — ANESTHESIA PROCEDURE NOTES
Airway       Patient location during procedure: OR       Procedure Start/Stop Times: 2/22/2023 7:37 AM  Staff -        CRNA: Lesly Patterson APRN CRNA       Performed By: CRNA  Consent for Airway        Urgency: elective  Indications and Patient Condition       Indications for airway management: jaciel-procedural       Induction type:intravenous       Mask difficulty assessment: 0 - not attempted    Final Airway Details       Final airway type: endotracheal airway       Successful airway: ETT - single  Endotracheal Airway Details        ETT size (mm): 8.0       Cuffed: yes       Successful intubation technique: direct laryngoscopy       DL Blade Type: Terrazas 2       Grade View of Cords: 1       Adjucts: stylet       Position: Right       Measured from: lips       Secured at (cm): 24       Bite block used: None    Post intubation assessment        Placement verified by: capnometry, equal breath sounds and chest rise        Number of attempts at approach: 1       Secured with: pink tape       Ease of procedure: easy       Dentition: Intact and Unchanged    Medication(s) Administered   Medication Administration Time: 2/22/2023 7:37 AM

## 2023-02-22 NOTE — BRIEF OP NOTE
[facility]    Brief Operative Note    Pre-operative diagnosis: Appendicitis, unspecified appendicitis type [K37]  Post-operative diagnosis   Appendicitis, non perforated  Cecal and Small bowel inflammation     Procedure: Procedure(s):  DIAGNOSTIC LAPAROSCOPY, LYSIS OF ADHESION, DRAIN PLACEMENT  Surgeon: Surgeon(s) and Role:     * Fabiola Miranda MD - Primary     * Shantell Anguiano MD - Resident - Assisting   Dr. Karlos Jansen - Assisting     Anesthesia: General   Estimated Blood Loss: 50 ml    Drains:  Indra-White 15F  Specimens: none  Findings:     Severe amounts of inflammation and subsequent bleeding in RLQ.  Small bowel and Cecum inflammed.  Appendix non perforated but heavily adhesed and inflammed.   Unable to mobilize appendix safely due to dense adhesions and diving into the pelvis.   Cecum mobilized.   Additional port utilized.  Bleeding controlled with pressure and coagulant powder.   RLQ assessed approximately 5 times, without and with insufflation, no bleeding was present, all prior bleeding was from raw surface oozing.   All previous laps removed.     Complications: Bleeding and inability to complete appendectomy.   Implants: OLEKSANDR drain

## 2023-02-22 NOTE — OP NOTE
OPERATIVE REPORT      Preoperative Diagnosis: Acute Appendicitis    Postoperative Diagnosis:       Appendicitis, non perforated  Cecal and Small bowel inflammation     Primary Staff: Dr. Fabiola Miranda     Assisting Staff: Dr. Shantell Anguiano, Dr. Karlos Jansen (requested for additional opinion on how to proceed, all three of us agreed given the inflammation, difficulty, lack of bowel prep, small bowel and cecal inflammation, entire appendix being inflammed, and now it being hemostatic upon multiple examinations, to proceed w/ drain placement and plan for larger operation in the future if it were warranted)     Indications for Surgery: Patient had acute appendicitis based on clinical and imaging findings. The patient was offered non op mgt and operative mgt, the patient elected operative management.       Consent:   Discussed with patient the current findings and explained the importance of having an appendectomy given his clinical and imaging findings and physical exam.      Patient was also given the [facility] Appendectomy packet that illustrated pictures for additional understanding/guidance.      Non operative measures were offered and described to the patient, but they declined that approach.     I have also informed the patient of the potential for the inability to perform the intended operation and proceed with any of the following: open appendectomy, or only diagnostic laparoscopy with aborted appendectomy.     Informed the patient of the risks of operation that include but are not limited to:  Bleeding, deep vein thrombosis, seroma formation, chronic pain, pain/opioid medicine addiction (heroin addiction), misdiagnosis, possible drain placement, post operative pain, scar formation, infection, hernia formation, bladder or urachus injury, superficial or deep or intra abdominal abscess formation at or near any organs, possible hernia formation, need for drain placement, need for additional scans, staple dehiscence,  suture tear, stump appendicitis in the future, conversion to open operation, fistula formation, need for extended resection of small bowel and/or cecum or right colon, leaks from staple lines or repair lines or ligation sites, need for additional surgeries, possible cancer diagnosis, possible return to the operating room emergently, Inflammatory Bowel Disease flare, increased or decreased risk for CD or UC, small and large bowel perforation/injury that may result in further complications and possible colostomy/ileostomy creation, ureter injury that would cause problems/necrosis of the ipsilateral kidney, large arterial injury (such as external iliac artery or vein) that would compromise his vasculature to that extremity which could result in morbid complications, strokes, brain anoxia, heart attack/myocardial infarction, pneumonia, death from operation/anesthesia or from previously stated complications, etc.     I have also provided and explained to the patient other alternatives to surgical intervention.      Patient was allowed and questioned if they had any inquiry, questions, or concerns that they would to bring up or address, all of which were answered respectfully.      Patient voiced understanding of all this, potential for additional unlisted complications, and wished to proceed with intended operation.      Fabiola Miarnda MD      Type of Anesthesia: General, performed and managed by Anesthesia Team    Operation Performed: Diagnostic Laparoscopy, aborted appendectomy, drain placement, lysis of adhesions       Findings during Operation:   Severe amounts of inflammation and subsequent bleeding in RLQ.  Small bowel and base of Cecum inflammed.  Appendix non perforated but heavily adhesed and inflammed.   Appendix appeared to loop in on itself, where it did loop in on itself it dove into the pelvis and this area was heavily adherend and had very dense tissue that was not easily manipulated/ligated.   Unable to  mobilize appendix safely due to dense adhesions and diving into the pelvis.   Cecum mobilized.   Additional port utilized.  Bleeding controlled with pressure and coagulant powder.   RLQ assessed approximately 5 times, without and with insufflation, no bleeding was present, all prior bleeding was from raw surface oozing.   All previous laps removed.   Lap and sponge and needle count was correct.   Elliott port had a single piece of plastic break off into the abdomen during instrument exchange that was subsequently retrieved and appeared to be in completeness with the Elliott port upon inspection, and testing/assessment.       Estimated Blood Loss: 50 mL    Specimens: none        Description of Procedure:   The patient was taken to the operating room and placed in the supine position.  General endotracheal anesthesia was then induced by the anesthesia team.   Positioning was performed by the circulator and anesthesia team, I was available, present, and assistive to their positioning.   Proper preoperative antibiotics were given.   A Talavera catheter was placed.   The patient s abdomen was sterilely prepped and draped in the standard surgical fashion.  Preoperative time out was performed.    Local was used to anesthetize the surgical sites prior to the incision.  Likewise, for two of the three operative sites, we used the local to anesthetize the fascia and peritoneum under direct visualization during the operation.     Peritoneal cavity was entered via Elliott technique infraumbilically since the pt was slender and not an optimal candidate for Optiview.   Three 0 Vicryl sutures were placed in simple interrupted format, two were used as stay sutures, and all three were tied down in order to close the fascial defect at the end of the case.     The peritoneal cavity was entered succesfully and was then insufflated with carbon dioxide to a pressure of 15.   The patient tolerated the insufflation well.   We immediately  evaluated the area we entered at to ensure no signs of injury to nearby organs occurred, which it did not.     Additional trocars were placed in the following positions: 5 mm left lower quadrant, and 5 mm to the left of the suprapubic line.  These ports were placed under direct visualization and no bleeding nor intra abdominal injury occurred during their placement.  We took all precautionary measures (visual and palpation) to ensure we did not injure the inferior epigastric vessels.   Of note, later in the case as it became more and more difficult, we did have to place a third 5 mm port in the proximity of the LUQ area for additional assistance and instrument utilization, this was done in the same fashion as the previous two 5 mm ports.     Prior to mobilization, we reviewed our site of Elliott port entry and assessed the organs by mobilizing the small and large bowel properly in order to assess for injury, of which no signs of injury were present.     During the operation, we used a 0 and 30 degree 5 mm scope.     Using a combination of traction and counter traction but taking great care to avoid injuring or perforating or spilling any appendiceal contents, of which did not occur, we mobilized the appendix with our graspers.    We took great care by visualizing our tips of all the instruments and ligasure constantly so that it was not touching or closer than 2-3 mm from any viscus organ or the ureter and vessels underneath.     Likewise, we did not touch any viscus organ with our Ligasure tips throughout the case.     Of note, there was dense, thick, extensive adhesions throughout the right lower quadrant, suggesting that there was a chronic component as well.  These adhesions were lysed with ligasure, it took prolonged period of time given the density, amount, and location of the adhesions.     With the amount of inflammation to the cecum, small bowel, the depth that the appendix traversed, lack of purulence,  inability to mobilize the appendix, area that the appendix traversed (we were concerned about the ureter and iliac vessels being injured), we elected to proceed with pressure application and abort the appendectomy component of the operation. I discussed this with two other surgeons (my chief and another staff), both of whom agreed.     Initially there was allot of raw surface oozing/bleeding that did not stop, however with the application of pressure, coagulant powder, and additional pressure, this entire area was hemostatic.   With this being said, we were significantly concerned about the bleeding, so we continued applying additional pressure and reassessed the area approximately 5 separate times, all of which showed no bleeding of the area and it remaining hemostatic throughout our examinations thereafter. Of note, no arterial or pulsating bleeding was noted but rather only continuous oozing, previously.         We evaluated and ensured no purulence was in the pelvis, RUQ, LUQ, or bilateral paracolic gutters, of which there was none.     We desufflated and allowed for reinsufflation after 2-3 minutes to assess for venous bleeding, of which was not present.   Abdomen was reinsufflated.      We inspected the abdomen again for port or instrument injury, of which none was present when examining the right colon, transverse colon, left colon, sigmoid colon, and small intestine.   No mesenteric bruising, nor bowel wall bruising, nor succus, nor bilious spillage was noted when examining the intestines throughout the abdomen.         The fascial defect was closed at the Elliott port under direct visualization, no bleeding was noted from the Elliott port upon closure either within the abdomen or externally. We assessed for bleeding again with the remainder of our instruments and camera and noted that there was no bleeding present anywhere in the abdomen.   No defect was palpable from the previous Elliott port site upon visual  inspection from underneath, and palpable inspection from above, nor was any present upon stressing the fascial repair gently while visualizing it from underneath.   Also, no loop of bowel was caught in the fascial repair upon our visualization.    A 15F drain was brought out and guided into the RLQ and secured with 3-0 nylon.     The pelvic port site was removed and ensured for no bleeding, a total of three times for 5-10 seconds, 2-3 minutes apart.  The abdomen was desuflatted fully.   The remaining 5 mm port was now removed while visualizing from within the port itself to ensure no bleeding was present.    The abdomen was cleaned.    The skin was assessed to ensure no bleeding was present, this was the case and we elected to close the skin thereafter with 4-0 monocryl.     Dermabond was applied.     All instrument, sponge, and needle counts were correct at the end of the case.    The patient was awoken from anesthesia and transported to the recovery room in stable condition.     Fabiola Miranda MD

## 2023-02-22 NOTE — ANESTHESIA PREPROCEDURE EVALUATION
Consult Note - General Surgery Service  Date of Admission:  2/21/2023  Consult Requested by: ED  Reason for Consult: Abdominal pain    Assessment & Plan: Surgery   Karlos Terrazas is a 37 year old male otherwise healthy with acute appendicitis.     Patient was counselled on the various treatment options for appendicitis including surgery + antibiotics, antibiotic therapy alone, or conservative measures, including the risks and benefits of each. The patient expressed understanding and wished to proceed with surgical management.     - Admit to general surgery  - Board and consent for Lap Appy for 2/22.  - IV Abx (Cefoxitin)  - IVF  - NPO after midnight  - Reassess patient in the AM.        Drains: PRESENT          - May have additional drains, review Avatar  Code Status:        Clinically Significant Risk Factors Present on Admission                             The patient's care was discussed with the Attending Physician, Dr. Miranda and Chief Resident/Fellow.    Madonna Rojas MD    Non-urgent messages: Securely message with [facility] (more info)  Text page via [employer] Paging/Directory     ______________________________________________________________________    Chief Complaint   Abdominal pain    History is obtained from the patient, electronic health record and emergency department physician    History of Present Illness   Karlos Terrazas is a 37 year old male otherwise healthy presenting with ~5 days of mid abdominal pain that migrated to his RLQ on 2/19. Patient initially presented to his PCP that day who obtained workup demonstrating mild leukocytosis and scheduled outpatient CT A/P with results demonstrating acute appendicitis. Patient was the encouraged to present to the ED for further evaluation.     Today, he continues to have mild RLQ abdominal pain that waxes and wanes, notably improved if he is not eating. Notes some chills but no fever. No CP or SOB. Endorses nausea, but no vomiting. No changes in bowel  or bladder habits.     Given his CT findings from 2/20, general surgery was asked to evaluate the patient for possible acute appendicitis.         Past Medical History    No past medical history on file.    Past Surgical History   No past surgical history on file.    Prior to Admission Medications   I have reviewed this patient's current medications       Review of Systems    The 10 point Review of Systems is negative other than noted in the HPI or here.     Social History   I have reviewed this patient's social history and updated it with pertinent information if needed.  Social History     Tobacco Use     Smoking status: Never     Smokeless tobacco: Never   Substance Use Topics     Alcohol use: Yes     Comment: occasional use     Drug use: Yes     Types: Marijuana       Family History   No significant family history, including no history of: IBS, Ulcerative colitis, or crohn's.     Allergies   No Known Allergies     Physical Exam   Vital Signs:                    Weight: 0 lbs 0 oz    Intake/Output Summary (Last 24 hours) at 2/21/2023 1654  Last data filed at 2/21/2023 1600  Gross per 24 hour   Intake 517.08 ml   Output --   Net 517.08 ml     General: Alert, resting comfortably in NAD/NTA. Cooperative  HEENT: NC/AT, sclerae anicteric.Oral mucosa moist  Neck: Trachea midline  Pulm: NLB on RA, no tachypnea/dyspnea  CV: RRR by RP, non-cyanotic, no LE edema.  ABD: Soft, Thin, non-tender, non-distended, no guarding or rebound tenderness. No obvious organomegaly.   Extrem: MA4E, no obvious deformities  Neuro:  A/Ox3, NFD,   Skin: Warm and well perfused    Data     I have personally reviewed the following data over the past 24 hrs:    7.6  \   13.0 (L)   / 633 (H)     140 101 10.7 /  77   4.8 28 1.00 \       ALT: 9 (L) AST: 17 AP: 116 TBILI: 0.2   ALB: 4.1 TOT PROTEIN: 7.5 LIPASE: 21       Imaging results reviewed over the past 24 hrs:   Patient's CT A/P from 2/20 unavailable - read was discussed with [facility] resident  and attending who confirmed the inflammation of the appendix w/ dilation to ~1 cmAnesthesia Pre-Procedure Evaluation    Patient: Karlos Terrazas   MRN: [id number] : 1985        Procedure : Procedure(s):  DIAGNOSTIC LAPAROSCOPY, LYSIS OF ADHESION, DRAIN PLACEMENT          History reviewed. No pertinent past medical history.   History reviewed. No pertinent surgical history.   No Known Allergies   Social History     Tobacco Use     Smoking status: Never     Smokeless tobacco: Never   Substance Use Topics     Alcohol use: Yes     Comment: occasional use      Wt Readings from Last 1 Encounters:   23 64 kg (141 lb 1.5 oz)        Anesthesia Evaluation   Pt has had prior anesthetic.         ROS/MED HX  ENT/Pulmonary:       Neurologic:       Cardiovascular:       METS/Exercise Tolerance:     Hematologic:       Musculoskeletal:       GI/Hepatic:     (+) appendicitis,     Renal/Genitourinary:       Endo:       Psychiatric/Substance Use:       Infectious Disease:       Malignancy:       Other:            Physical Exam    Airway        Mallampati: II   TM distance: > 3 FB   Neck ROM: full   Mouth opening: > 3 cm    Respiratory Devices and Support         Dental           Cardiovascular             Pulmonary                   OUTSIDE LABS:  CBC:   Lab Results   Component Value Date    WBC 7.6 2023    WBC 12.9 (H) 2023    HGB 13.0 (L) 2023    HGB 12.0 (L) 2023    HCT 42.3 2023    HCT 39.6 (L) 2023     (H) 2023     (H) 2023     BMP:   Lab Results   Component Value Date     2023     2023    POTASSIUM 4.8 2023    POTASSIUM 4.2 2023    CHLORIDE 101 2023    CHLORIDE 98 2023    CO2 28 2023    CO2 26 2023    BUN 10.7 2023    BUN 10.3 2023    CR 1.00 2023    CR 1.09 2023    GLC 77 2023     (H) 2023     COAGS: No results found for: PTT, INR, FIBR  POC: No results  found for: BGM, HCG, HCGS  HEPATIC:   Lab Results   Component Value Date    ALBUMIN 4.1 02/21/2023    PROTTOTAL 7.5 02/21/2023    ALT 9 (L) 02/21/2023    AST 17 02/21/2023    ALKPHOS 116 02/21/2023    BILITOTAL 0.2 02/21/2023     OTHER:   Lab Results   Component Value Date    SABRINA 9.6 02/21/2023    LIPASE 21 02/21/2023    SED 26 (H) 02/16/2023       Anesthesia Plan    ASA Status:  2, emergent       Anesthesia Type: General.     - Airway: ETT   Induction: RSI.   Maintenance: Inhalation.   Techniques and Equipment:     - Lines/Monitors: 2nd IV     Consents    Anesthesia Plan(s) and associated risks, benefits, and realistic alternatives discussed. Questions answered and patient/representative(s) expressed understanding.     - Discussed: Risks, Benefits and Alternatives for BOTH SEDATION and the PROCEDURE were discussed     - Discussed with:  Patient         Postoperative Care    Pain management: IV analgesics.   PONV prophylaxis: Ondansetron (or other 5HT-3)     Comments:                Madonna Rojas MD

## 2023-02-22 NOTE — PLAN OF CARE
Pt seen post op.    Explained findings.     Drain serosang.     Admit with abx and serial abd exams and labs.

## 2023-02-22 NOTE — PROGRESS NOTES
Admitted/transferred from: PACU  2 RN full   skin assessment completed by Daisha Mancilla, NEIL and Lyla MADSEN  Skin assessment finding:   - 3 abdominal lap sites  - OLEKSANDR  Bruise on R side of coccyx     Interventions/actions: None     Will continue to monitor.

## 2023-02-22 NOTE — ANESTHESIA CARE TRANSFER NOTE
Patient: Karlos Terrazas    Procedure: Procedure(s):  DIAGNOSTIC LAPAROSCOPY, LYSIS OF ADHESION, DRAIN PLACEMENT       Diagnosis: Appendicitis, unspecified appendicitis type [K37]  Diagnosis Additional Information: No value filed.    Anesthesia Type:   No value filed.     Note:    Oropharynx: oropharynx clear of all foreign objects and spontaneously breathing  Level of Consciousness: awake  Oxygen Supplementation: face mask  Level of Supplemental Oxygen (L/min / FiO2): 6  Independent Airway: airway patency satisfactory and stable  Dentition: dentition unchanged  Vital Signs Stable: post-procedure vital signs reviewed and stable  Report to RN Given: handoff report given  Patient transferred to: PACU    Handoff Report: Identifed the Patient, Identified the Reponsible Provider, Reviewed the pertinent medical history, Discussed the surgical course, Reviewed Intra-OP anesthesia mangement and issues during anesthesia, Set expectations for post-procedure period and Allowed opportunity for questions and acknowledgement of understanding      Vitals:  Vitals Value Taken Time   /78 02/22/23 0955   Temp     Pulse 88 02/22/23 0957   Resp     SpO2 99 % 02/22/23 0957   Vitals shown include unvalidated device data.    Electronically Signed By: LUCAS Leon CRNA  February 22, 2023  9:59 AM

## 2023-02-22 NOTE — PROGRESS NOTES
Pt sleeping overnight. VSS, RA. Independent in room. Voiding fine, +bs and flatus, mild nausea but pt denies need for antiemetic. Tylenol x1 for pain. MIVF infusing. NPO after midnight. CHG wipes x2. Down to OR this AM for surgery.    Lola Rees RN on 2/22/2023 at 5:40 AM

## 2023-02-22 NOTE — PROGRESS NOTES
Admitted/transferred from:   2 RN full   skin assessment completed by Lola Rees RN and German Gan.  Skin assessment finding: skin intact, no problems   Interventions/actions: other N/A     Will continue to monitor.    Lola Rees RN on 2/22/2023 at 5:38 AM

## 2023-02-23 LAB
ERYTHROCYTE [DISTWIDTH] IN BLOOD BY AUTOMATED COUNT: 13.3 % (ref 10–15)
GLUCOSE BLDC GLUCOMTR-MCNC: 89 MG/DL (ref 70–99)
GLUCOSE BLDC GLUCOMTR-MCNC: 96 MG/DL (ref 70–99)
HCT VFR BLD AUTO: 33.7 % (ref 40–53)
HGB BLD-MCNC: 10.3 G/DL (ref 13.3–17.7)
HGB BLD-MCNC: 10.4 G/DL (ref 13.3–17.7)
MCH RBC QN AUTO: 25.2 PG (ref 26.5–33)
MCHC RBC AUTO-ENTMCNC: 30.9 G/DL (ref 31.5–36.5)
MCV RBC AUTO: 82 FL (ref 78–100)
PLATELET # BLD AUTO: 461 10E3/UL (ref 150–450)
RBC # BLD AUTO: 4.12 10E6/UL (ref 4.4–5.9)
WBC # BLD AUTO: 13.3 10E3/UL (ref 4–11)

## 2023-02-23 PROCEDURE — 250N000011 HC RX IP 250 OP 636

## 2023-02-23 PROCEDURE — 120N000002 HC R&B MED SURG/OB UMMC

## 2023-02-23 PROCEDURE — 36415 COLL VENOUS BLD VENIPUNCTURE: CPT

## 2023-02-23 PROCEDURE — 258N000003 HC RX IP 258 OP 636

## 2023-02-23 PROCEDURE — 36415 COLL VENOUS BLD VENIPUNCTURE: CPT | Performed by: STUDENT IN AN ORGANIZED HEALTH CARE EDUCATION/TRAINING PROGRAM

## 2023-02-23 PROCEDURE — 85027 COMPLETE CBC AUTOMATED: CPT

## 2023-02-23 PROCEDURE — 85018 HEMOGLOBIN: CPT | Performed by: STUDENT IN AN ORGANIZED HEALTH CARE EDUCATION/TRAINING PROGRAM

## 2023-02-23 PROCEDURE — 250N000013 HC RX MED GY IP 250 OP 250 PS 637

## 2023-02-23 RX ORDER — SIMETHICONE 80 MG
80 TABLET,CHEWABLE ORAL EVERY 6 HOURS PRN
Status: DISCONTINUED | OUTPATIENT
Start: 2023-02-23 | End: 2023-02-25 | Stop reason: HOSPADM

## 2023-02-23 RX ORDER — ACETAMINOPHEN 325 MG/1
650 TABLET ORAL EVERY 4 HOURS PRN
Status: DISCONTINUED | OUTPATIENT
Start: 2023-02-23 | End: 2023-02-25 | Stop reason: HOSPADM

## 2023-02-23 RX ORDER — OXYCODONE HYDROCHLORIDE 5 MG/1
5 TABLET ORAL EVERY 4 HOURS PRN
Qty: 6 TABLET | Refills: 0 | Status: CANCELLED | OUTPATIENT
Start: 2023-02-23

## 2023-02-23 RX ORDER — ACETAMINOPHEN 650 MG/1
650 SUPPOSITORY RECTAL EVERY 6 HOURS PRN
Status: DISCONTINUED | OUTPATIENT
Start: 2023-02-23 | End: 2023-02-25 | Stop reason: HOSPADM

## 2023-02-23 RX ADMIN — ACETAMINOPHEN 650 MG: 325 TABLET ORAL at 14:30

## 2023-02-23 RX ADMIN — METHOCARBAMOL 500 MG: 500 TABLET ORAL at 18:17

## 2023-02-23 RX ADMIN — METRONIDAZOLE 500 MG: 500 INJECTION, SOLUTION INTRAVENOUS at 04:21

## 2023-02-23 RX ADMIN — METRONIDAZOLE 500 MG: 500 INJECTION, SOLUTION INTRAVENOUS at 11:51

## 2023-02-23 RX ADMIN — CEFTRIAXONE SODIUM 2 G: 2 INJECTION, POWDER, FOR SOLUTION INTRAMUSCULAR; INTRAVENOUS at 13:29

## 2023-02-23 RX ADMIN — SODIUM CHLORIDE, POTASSIUM CHLORIDE, SODIUM LACTATE AND CALCIUM CHLORIDE: 600; 310; 30; 20 INJECTION, SOLUTION INTRAVENOUS at 03:55

## 2023-02-23 RX ADMIN — METHOCARBAMOL 500 MG: 500 TABLET ORAL at 11:47

## 2023-02-23 RX ADMIN — METHOCARBAMOL 500 MG: 500 TABLET ORAL at 22:57

## 2023-02-23 RX ADMIN — SIMETHICONE 80 MG: 80 TABLET, CHEWABLE ORAL at 23:22

## 2023-02-23 RX ADMIN — ACETAMINOPHEN 650 MG: 325 TABLET ORAL at 05:10

## 2023-02-23 RX ADMIN — ACETAMINOPHEN 650 MG: 325 TABLET ORAL at 09:48

## 2023-02-23 RX ADMIN — METRONIDAZOLE 500 MG: 500 INJECTION, SOLUTION INTRAVENOUS at 20:02

## 2023-02-23 RX ADMIN — ACETAMINOPHEN 650 MG: 325 TABLET ORAL at 23:34

## 2023-02-23 RX ADMIN — SENNOSIDES AND DOCUSATE SODIUM 1 TABLET: 50; 8.6 TABLET ORAL at 10:48

## 2023-02-23 RX ADMIN — ACETAMINOPHEN 650 MG: 325 TABLET ORAL at 18:15

## 2023-02-23 RX ADMIN — SENNOSIDES AND DOCUSATE SODIUM 2 TABLET: 50; 8.6 TABLET ORAL at 20:02

## 2023-02-23 ASSESSMENT — ACTIVITIES OF DAILY LIVING (ADL)
ADLS_ACUITY_SCORE: 39

## 2023-02-23 NOTE — DISCHARGE SUMMARY
[facility]  Discharge Summary  General Surgery     Karlos Terrazas MRN# [id number]   YOB: 1985 Age: 37 year old     Date of Admission:  2/21/2023  Date of Discharge::  2/25/2023  Admitting Physician:  Fabiola Miranda MD  Discharge Physician:  Fabiola Miranda MD  Primary Care Physician:        Karlos Schulz          Admission Diagnoses:   Appendicitis, unspecified appendicitis type [K37]  Acute appendicitis with localized peritonitis and gangrene, without perforation or abscess [K35.31]          Discharge Diagnosis:   No discharge information exists for this patient.   Same as above s/p below procedure         Procedures:     Procedure(s):  DIAGNOSTIC LAPAROSCOPY, LYSIS OF ADHESION, DRAIN PLACEMENT          Consultations:   None          Brief History of Illness:   Karlos Terrazas is a 37 year old male otherwise healthy presenting with ~5 days of mid abdominal pain that migrated to his RLQ on 2/19. Patient initially presented to his PCP that day who obtained workup demonstrating mild leukocytosis and scheduled outpatient CT A/P with results demonstrating acute appendicitis. Patient was the encouraged to present to the ED for further evaluation. Patient was counselled on the various treatment options for appendicitis including surgery + antibiotics, antibiotic therapy alone, or conservative measures, including the risks and benefits of each. The patient expressed understanding and wished to proceed with surgical management.            Hospital Course:       The patient underwent laparoscopic appendectomy on 2/22. Upon entry into the abdomen, significant amounts of inflammation and bleeding was encountered, majority of the inflammation was noted in the small bowel and cecum. The appendix was very hard to access and non-mobile with lots of adhesive tissue, but itself appeared healthy and not perforated. After hemostasis was achieved, the decision was made to abort the case due to concerns  that appendix would not be able to be safely accessed, divided and retrieved. .   He was transferred to the PACU and then floor with serial abdominal exams and q6 HGB checks POD1, all of which were reassuring for no developing peritonitis or ongoing bleeding. The remainder of his postoperative course was unremarkable. Talavera was removed on POD#0. He had return of bowel function on POD#1 and his diet was slowly advanced. On the day of discharge, he was tolerating a regular diet, his pain was well controlled with oral pain medications, he was voiding spontaneously, and ambulating independently. Patient with follow up with us in two weeks for post-operative check. We will also set up a GI evaluation for possible IBD.            Imaging Studies:   No results found for this or any previous visit.           Medications Prior to Admission:     No medications prior to admission.              Discharge Medications:     Current Discharge Medication List      START taking these medications    Details   acetaminophen (TYLENOL) 325 MG tablet Take 2 tablets (650 mg) by mouth every 4 hours as needed for mild pain or other (and adjunct with moderate or severe pain or per patient request)  Qty: 90 tablet, Refills: 0    Associated Diagnoses: Appendicitis, unspecified appendicitis type      amoxicillin-clavulanate (AUGMENTIN) 875-125 MG tablet Take 1 tablet by mouth every 12 hours for 14 days  Qty: 28 tablet, Refills: 0    Associated Diagnoses: Appendicitis, unspecified appendicitis type      gabapentin (NEURONTIN) 100 MG capsule Take 2 capsules (200 mg) by mouth 3 times daily for 3 days  Qty: 10 capsule, Refills: 0    Associated Diagnoses: Appendicitis, unspecified appendicitis type      hydrOXYzine (ATARAX) 25 MG tablet Take 1 tablet (25 mg) by mouth every 6 hours as needed for other (adjuvant pain)  Qty: 12 tablet, Refills: 0    Associated Diagnoses: Appendicitis, unspecified appendicitis type      methocarbamol (ROBAXIN) 500 MG  tablet Take 1 tablet (500 mg) by mouth 3 times daily as needed for muscle spasms  Qty: 21 tablet, Refills: 0    Associated Diagnoses: Appendicitis, unspecified appendicitis type                     Medications Discontinued or Adjusted During This Hospitalization:   New narcotics initiated: oxycodone short course           Antibiotics Prescribed at Discharge:   Augmentin, Duration: 14 days           Day of Discharge Physical Exam:   Temp:  [97.7  F (36.5  C)-98.7  F (37.1  C)] 98.7  F (37.1  C)  Pulse:  [72-92] 92  Resp:  [16-18] 18  BP: (117-126)/(76-79) 126/79  SpO2:  [98 %-100 %] 98 %    General: awake, alert, no acute distress, laying comfortably in bed   CV: warm, well perfused   Pulm: breathing comfortably on room air   Abdomen: soft, mildly distended and tympanic to percussion but easily compressible, non tender to deep palpation, no rebound or guarding  Incision c/d/i, OLEKSANDR drain in RLQ with scant serosanguinous output   Extremities: no edema, moving all extremities spontaneously and without apparent deficit            Final Pathology Result:   N/A           Discharge Instructions and Follow-Up:     Discharge Procedure Orders   CT Abdomen pelvis w contrast*   Standing Status: Future Standing Exp. Date: 05/25/23     Order Specific Question Answer Comments   Clinical Info for the Interpreting Provider Initially had imaging concerning for acute appendicits, due to post-ileal location of appendix and dense adhesions lap appy aborted.    Priority Routine      Adult GI  Referral - Consult Only   Standing Status: Future   Referral Priority: Routine: Next available opening Referral Type: Consultation   Requested Specialty: Gastroenterology   Number of Visits Requested: 1     Adult GI  Referral - Procedure Only   Standing Status: Future   Referral Priority: Routine: Next available opening Referral Type: Consultation   Requested Specialty: Gastroenterology   Number of Visits Requested: 1     Reason for  your hospital stay   Order Comments: Imaging concerning for acute appendicitis     Activity   Order Comments: Your activity upon discharge: no lifting greater than 20 lbs for 4 weeks     Order Specific Question Answer Comments   Is discharge order? Yes      Activity   Order Comments: Your activity upon discharge: activity as tolerated and no heavy lifting for 4 weeks     Order Specific Question Answer Comments   Is discharge order? Yes      Reason for your hospital stay   Order Comments: Acute appendicitis     Discharge Instructions   Order Comments: DISCHARGE INSTRUCTIONS  ACTIVITY  - No lifting, pushing, pulling more than 15-20 pounds for 4 weeks from surgery  - You may shower 48 hours after surgery but do not scrub incisions; simply let soapy water run over them. If you have steri-strips or glue over the incisions, these items will fall off on their own and do not need to be replaced.  - Do not soak in a bath tub or pool for 6 weeks after surgery.  - No driving while on narcotic pain medications and until you can safely twist/turn and press a gas pedal without pain.     INCISIONS  - You may shower and get incisions wet starting 24 hrs after surgery  - Do not scrub incisions or submerge wounds (e.g. bath, pool, hot tub, etc.) for 2 weeks or until the glue or steri-strips have come off  - Steri-strips will fall off on their own in approximately 7-10 days  - If you have staples, these will be removed at your follow-up appointment in the General Surgery Clinic      MEDICATIONS AND PAIN CONTROL  - We recommend you take Tylenol (acetaminophen) around the clock for baseline pain control and then oxycodone as needed for pain throughout the day. If you have been prescribed Percocet or Vicodin, be aware that they contains Tylenol along with a narcotic pain medication such as oxycodone or hydrocodone.   - Do not take any additional Tylenol (acetaminophen) while using a narcotic pain medication which includes  acetaminophen  - Do not take more than 4,000mg of acetaminophen in any 24 hour period, as this can cause liver damage  - Take stool softeners such as Senna or Miralax while you are using narcotics to prevent constipation, but stop if you develop diarrhea  - Wean yourself off of narcotic pain medications    Antibiotics: Augmentin every 12 hours for 14 days      FOLLOW-UP  - Please follow up with Dr Tafoya on March 6th 2023, at 9:45am at the General Surgery Clinic on [facility]. If you have not heard from us regarding this appointment or have questions, please call the General Surgery Clinic at [phone].   - Call your primary care provider to touch base regarding your recent admission.    - Call or return sooner than your regularly scheduled visit if you develop any of the following:    - fever >101.5,    - uncontrolled pain,    - uncontrolled nausea or vomiting,    - as well as increased redness, swelling, or drainage from your wound.   -  A nurse from the General Surgery Clinic will contact you 24 hours, or next business day, after your discharge from the hospital. If you have questions or to schedule a follow up appointment please call the General Surgery Clinic at [phone].  Call [phone] and ask to speak with the Surgery resident on call if you are having troubles in the evenings, at night, or on the weekend.  -  If you are receiving home care please inform your home care nurse of our contact number.     Diet   Order Comments: Follow this diet upon discharge: Regular     Order Specific Question Answer Comments   Is discharge order? Yes               Home Health Care:     Not needed           Discharge Disposition:     Discharged to home      Condition at discharge: Stable    Pt will need Colonoscopy and GI f/u appt in 4-6 weeks, Augmentin for 14 days, and a CT scan in 4-6 weeks.      Patient was seen, examined, and discussed on day of discharge with chief resident Dr Lewis, who discussed with  staff surgeon Dr Miranda.    Bertha Carey  [facility] General Surgery PGY1

## 2023-02-23 NOTE — PROGRESS NOTES
[facility]    Progress Note - Emergency General Surgery Service       Date of Admission:  2/21/2023    Assessment & Plan: Surgery   Karlos Terrazas is a 37 year old male otherwise healthy with acute appendicitis. He underwent laparoscopy on 2/22 where it was determined that his appendix could not be safely identified and removed, thus a drain was placed and the case was aborted. Pt has been stable overnight and without any change in his abdominal exams.     - Ambulate often today to facilitate recovery.   - Regular diet, pt advised to take it slow   - Drain education needed.   - Once pt tolerating PO switch to PO abx  - Multimodal pain control.        Diet: Regular Diet Adult    DVT Prophylaxis: Pneumatic Compression Devices  Talavera Catheter: Not present  Lines: None     Drains:      - 1 Closed/Suction Drain(s)   Cardiac Monitoring: None  Code Status: Full Code      Clinically Significant Risk Factors   None                             Disposition Plan    Once pt tolerating PO intake w/o issues can likely D/C    The patient's care was discussed with the Chief Resident/Fellow.    Avery Franco MD  [facility]  Text page via [employer] Paging/Directory     ______________________________________________________________________    Interval History   - Feels well and is passing some gas   - Encouraged to ambulate often today     Physical Exam   Vital Signs: Temp: 97.6  F (36.4  C) Temp src: Temporal BP: 114/67 Pulse: 56   Resp: 16 SpO2: 97 % O2 Device: None (Room air) Oxygen Delivery: 4 LPM  Weight: 141 lbs 1.51 oz    Intake/Output Summary (Last 24 hours) at 2/23/2023 0758  Last data filed at 2/23/2023 0346  Gross per 24 hour   Intake 1126.67 ml   Output 2070 ml   Net -943.33 ml     Constitutional: awake, alert, cooperative, no apparent distress, and appears stated age  Respiratory: No increased work of breathing, good air  exchange.   Cardiovascular: regular rate and rhythm  GI: Abdomen soft, appropriately tender, non-distended, OLEKSANDR drain w/ scant serosanguinous output.   Skin: normal skin color, texture, turgor          Data     I have personally reviewed the following data over the past 24 hrs:    13.3 (H)  \   10.4 (L)   / 461 (H)     N/A N/A N/A /  96   N/A N/A N/A \       Imaging results reviewed over the past 24 hrs:   No results found for this or any previous visit (from the past 24 hour(s)).

## 2023-02-23 NOTE — PROGRESS NOTES
Cross-cover note: 8:18 PM 2/22/2023 02/22/2023    General Surgery Cross Cover Note      A 37 years old male patient who had acute appendicitis, who underwent Laparoscopic appendectomy that was aborted due to dense adhesions. I was asked by the day team to do serial abdominal exam this evening & at midnight.      Patient was seen ~ 6:45 PM At bedside he reports feeling well overall with minimal nausea, denies vomiting, CP, SOB, lightheadedness.     B/P: 122/62, T: 97.7, P: 78, R: 16    PE:  A&O x3, NAD  Breathing non-labored on 2L NC Sat 99%  Abd soft, minimal RLQ tenderness, non-distended, non-peritonitic   Extr. Warm to touch  Incisions clean, dry, intact  Drain: RLQ, with ~40 mL of serosang fluid.     A&P :    A 37 years old male patient S/p aborted Lap appendectomy, his abdominal exam is benign, with stable Hgb check @ 6:00PM (10.9). Continue care per primary team plan, will see patient again @ 12:00 AM.     Please page resident on call,   Violet Colorado MD  Surgery resident, PGY-1  [facility]

## 2023-02-23 NOTE — PLAN OF CARE
Goal Outcome Evaluation:      Plan of Care Reviewed With: patient    Overall Patient Progress: improvingOverall Patient Progress: improving    POD# 1, lap appendectomy that was aborted due to inflammation.    Alert and oriented x 4.  Report abdominal pain that is managed well with prn acetaminophen 650 mg and robaxin.  Regular diet, tolerating well. Denies nausea and vomiting.  Abdomen soft, tender to touch to right lower abdomen. Bowel sounds audible, +gas no BM. Right OLEKSANDR with clean dressing, minimal serosanguinous drainage. Teaching done, hand-outs and supplies provided to patient. Nursing to continue follow-up. Lap sites CDI/JULIUS.  Heart regular rate and rhythm.  Lung sounds audible and clear.  Up with SBA of 1, ambulated outside the room. Tolerated well.  OVSS, afebrile.  PIV-SL x 2, on IV abx metronidazole and rocephin.    PLAN: Continue with the care plan. Potential discharge tomorrow. Will need to transition to po antibiotic?

## 2023-02-23 NOTE — PROGRESS NOTES
Shift: [id number]  Vitals: Temp: 97.7  F (36.5  C) Temp src: Temporal BP: 122/62 Pulse: 78   Resp: 16 SpO2: 96 % O2 Device: None (Room air)   Neuro: Alert & oriented x4. Calls appropriately   Cardiac  Respiratory: on RA, denies SOB.   GI/: Voiding spontaneously. Not passing gas yet.  Activity: SBA   Diet/Nausea: Clear Liquid Diet. Intermittent nausea. Pt reports its manageable and does not need any medication for nausea.  Pain: Managed with Robaxin & Tylenol x1.  Skin: lap sites x3, JULIUS with liquid bandage.   LDA/Drains: R OLEKSANDR drain to bulb suction. PIV infusing LR @ 100 mL/hr.   Labs: Reviewed.   Plan: Continue with POC.

## 2023-02-23 NOTE — PROGRESS NOTES
Pt sleeping overnight. VSS, RA. Pt not interested in using oxycodone, Tylenol dose increased & lidocaine patches ordered. Pain well managed after these interventions. Voiding fine, hypo bs, -flatus. Intermittent nausea, no meds requested. Lap sites CDI, OLEKSANDR w/ small amount out. SBA when OOB.     Lola Rees RN on 2/23/2023 at 6:59 AM

## 2023-02-23 NOTE — PROGRESS NOTES
Cross-cover note: 8:18 PM 2/22/2023 02/23/2023    General Surgery Cross Cover Note      A 37 years old male patient who had acute appendicitis, who underwent Laparoscopic appendectomy that was aborted due to dense adhesions. I was asked by the day team to do serial abdominal exam this evening & at midnight.      Patient was seen ~ 12:02 PM At bedside he reports feeling well overall with minimal nausea, denies vomiting, CP, SOB, lightheadedness.     B/P: 122/62, T: 97.7, P: 78, R: 16    PE:  A&O x3, NAD  Breathing non-labored on 2L NC Sat 99%  Abd soft, minimal RLQ tenderness, non-distended, non-peritonitic   Extr. Warm to touch  Incisions clean, dry, intact  Drain: RLQ, with ~10 mL of serosang fluid.     A&P :    A 37 years old male patient S/p aborted Lap appendectomy, his abdominal exam is benign, with stable Hgb check @ 12:00PM (10.4). Continue care per primary team plan, will see patient again @ 6:00 AM by day team.     Please page resident on call,   Violet Colorado MD  Surgery resident, PGY-1  [facility]

## 2023-02-24 LAB
ERYTHROCYTE [DISTWIDTH] IN BLOOD BY AUTOMATED COUNT: 13.8 % (ref 10–15)
GLUCOSE BLDC GLUCOMTR-MCNC: 99 MG/DL (ref 70–99)
HCT VFR BLD AUTO: 41.4 % (ref 40–53)
HGB BLD-MCNC: 12.4 G/DL (ref 13.3–17.7)
MCH RBC QN AUTO: 24.8 PG (ref 26.5–33)
MCHC RBC AUTO-ENTMCNC: 30 G/DL (ref 31.5–36.5)
MCV RBC AUTO: 83 FL (ref 78–100)
PLATELET # BLD AUTO: 586 10E3/UL (ref 150–450)
RBC # BLD AUTO: 5 10E6/UL (ref 4.4–5.9)
WBC # BLD AUTO: 11.3 10E3/UL (ref 4–11)

## 2023-02-24 PROCEDURE — 36415 COLL VENOUS BLD VENIPUNCTURE: CPT

## 2023-02-24 PROCEDURE — 120N000002 HC R&B MED SURG/OB UMMC

## 2023-02-24 PROCEDURE — 250N000011 HC RX IP 250 OP 636

## 2023-02-24 PROCEDURE — 85018 HEMOGLOBIN: CPT

## 2023-02-24 PROCEDURE — 250N000013 HC RX MED GY IP 250 OP 250 PS 637

## 2023-02-24 RX ORDER — ACETAMINOPHEN 325 MG/1
650 TABLET ORAL EVERY 4 HOURS PRN
Qty: 90 TABLET | Refills: 0 | Status: SHIPPED | OUTPATIENT
Start: 2023-02-24 | End: 2023-03-06

## 2023-02-24 RX ORDER — OXYCODONE HYDROCHLORIDE 5 MG/1
5-10 TABLET ORAL EVERY 4 HOURS PRN
Status: DISCONTINUED | OUTPATIENT
Start: 2023-02-24 | End: 2023-02-24

## 2023-02-24 RX ORDER — HYDROXYZINE HYDROCHLORIDE 25 MG/1
25 TABLET, FILM COATED ORAL EVERY 6 HOURS PRN
Status: DISCONTINUED | OUTPATIENT
Start: 2023-02-24 | End: 2023-02-25 | Stop reason: HOSPADM

## 2023-02-24 RX ORDER — GABAPENTIN 100 MG/1
200 CAPSULE ORAL 3 TIMES DAILY
Status: CANCELLED | OUTPATIENT
Start: 2023-02-24

## 2023-02-24 RX ORDER — GABAPENTIN 100 MG/1
200 CAPSULE ORAL 3 TIMES DAILY
Status: DISCONTINUED | OUTPATIENT
Start: 2023-02-24 | End: 2023-02-25 | Stop reason: HOSPADM

## 2023-02-24 RX ORDER — HYDROXYZINE HYDROCHLORIDE 50 MG/1
50 TABLET, FILM COATED ORAL EVERY 6 HOURS PRN
Status: DISCONTINUED | OUTPATIENT
Start: 2023-02-24 | End: 2023-02-25 | Stop reason: HOSPADM

## 2023-02-24 RX ORDER — BISACODYL 10 MG
10 SUPPOSITORY, RECTAL RECTAL DAILY PRN
Status: DISCONTINUED | OUTPATIENT
Start: 2023-02-24 | End: 2023-02-25 | Stop reason: HOSPADM

## 2023-02-24 RX ORDER — KETOROLAC TROMETHAMINE 15 MG/ML
30 INJECTION, SOLUTION INTRAMUSCULAR; INTRAVENOUS EVERY 6 HOURS PRN
Status: DISCONTINUED | OUTPATIENT
Start: 2023-02-24 | End: 2023-02-24

## 2023-02-24 RX ORDER — METHOCARBAMOL 500 MG/1
500 TABLET, FILM COATED ORAL 3 TIMES DAILY PRN
Qty: 21 TABLET | Refills: 0 | Status: SHIPPED | OUTPATIENT
Start: 2023-02-24 | End: 2023-04-07

## 2023-02-24 RX ORDER — SCOLOPAMINE TRANSDERMAL SYSTEM 1 MG/1
1 PATCH, EXTENDED RELEASE TRANSDERMAL
Status: CANCELLED | OUTPATIENT
Start: 2023-02-24

## 2023-02-24 RX ADMIN — ONDANSETRON 4 MG: 4 TABLET, ORALLY DISINTEGRATING ORAL at 11:23

## 2023-02-24 RX ADMIN — GABAPENTIN 200 MG: 100 CAPSULE ORAL at 21:32

## 2023-02-24 RX ADMIN — SENNOSIDES AND DOCUSATE SODIUM 2 TABLET: 50; 8.6 TABLET ORAL at 09:39

## 2023-02-24 RX ADMIN — SENNOSIDES AND DOCUSATE SODIUM 2 TABLET: 50; 8.6 TABLET ORAL at 21:32

## 2023-02-24 RX ADMIN — AMOXICILLIN AND CLAVULANATE POTASSIUM 1 TABLET: 875; 125 TABLET ORAL at 21:32

## 2023-02-24 RX ADMIN — METHOCARBAMOL 500 MG: 500 TABLET ORAL at 07:37

## 2023-02-24 RX ADMIN — ACETAMINOPHEN 650 MG: 325 TABLET ORAL at 03:36

## 2023-02-24 RX ADMIN — ACETAMINOPHEN 650 MG: 325 TABLET ORAL at 07:37

## 2023-02-24 RX ADMIN — ACETAMINOPHEN 650 MG: 325 TABLET ORAL at 11:44

## 2023-02-24 RX ADMIN — AMOXICILLIN AND CLAVULANATE POTASSIUM 1 TABLET: 875; 125 TABLET ORAL at 11:44

## 2023-02-24 RX ADMIN — METRONIDAZOLE 500 MG: 500 INJECTION, SOLUTION INTRAVENOUS at 03:39

## 2023-02-24 RX ADMIN — GABAPENTIN 200 MG: 100 CAPSULE ORAL at 14:24

## 2023-02-24 RX ADMIN — ACETAMINOPHEN 650 MG: 325 TABLET ORAL at 18:41

## 2023-02-24 ASSESSMENT — ACTIVITIES OF DAILY LIVING (ADL)
ADLS_ACUITY_SCORE: 22
ADLS_ACUITY_SCORE: 39
ADLS_ACUITY_SCORE: 22
ADLS_ACUITY_SCORE: 22
ADLS_ACUITY_SCORE: 39
ADLS_ACUITY_SCORE: 39
DIFFICULTY_EATING/SWALLOWING: NO
ADLS_ACUITY_SCORE: 39
WEAR_GLASSES_OR_BLIND: NO
CHANGE_IN_FUNCTIONAL_STATUS_SINCE_ONSET_OF_CURRENT_ILLNESS/INJURY: NO
WALKING_OR_CLIMBING_STAIRS_DIFFICULTY: NO
ADLS_ACUITY_SCORE: 39
ADLS_ACUITY_SCORE: 39
FALL_HISTORY_WITHIN_LAST_SIX_MONTHS: NO
ADLS_ACUITY_SCORE: 22
ADLS_ACUITY_SCORE: 22
CONCENTRATING,_REMEMBERING_OR_MAKING_DECISIONS_DIFFICULTY: NO
TOILETING_ISSUES: NO
DRESSING/BATHING_DIFFICULTY: NO
DOING_ERRANDS_INDEPENDENTLY_DIFFICULTY: NO
ADLS_ACUITY_SCORE: 22

## 2023-02-24 NOTE — PLAN OF CARE
VSS on RA. Up with SBA, ambulating in halls. Pain somewhat managed with PRN Tylenol. Pt trying to avoid taking Oxycodone, team added Gabapentin and PRN Atarax today. Given PRN Zofran x1 for c/o nausea. Reg diet, appetite fair. Voiding. Passing flatus, no BM. Pt states he may want to try a suppository later tonight. OLEKSANDR drain to bulb suction, scant amount of serous drainage out. Antibiotic switched to oral today. Please offer Melatonin at bedtime, as pt states he has not been sleeping well. Possible discharge tomorrow. Continue to monitor and with POC.

## 2023-02-24 NOTE — PLAN OF CARE
Pt seen and examined.    HDS, aox4, zimmerman, nlb, nad, abd soft, nd, appropriately ttp RLQ (mild), no diffuse peritonitis, drain serosang. Nausea w/ pain meds, adjust meds, decrease strength of oxycodone, trial of scopolamine.  Hg improved, stop Hg checks (downtrend likely from multiple infusions of LR that have not been charted). Ambulate and SCD for dvt ppx, start lovenox if he remains in hospital. Remove drain if output <40. Plan for discharge tomorrow. discontinue IV abx and start augmentin. Needs outpt f/u for repeat scans and Colonoscopy and GI team as well for Chrons evaluation.

## 2023-02-24 NOTE — PROGRESS NOTES
Pt resting overnight. VSS, RA. Right abdomen pain 6/10, prn tylenol given, pt does not want to take oxycodone. Simethicone x1 for gas pain. Voiding fine, hypo bs, passing flatus overnight, -BM. Pt gets waves of nausea at times, no antiemetics requested. SBA in halls, tolerated. Attempted to have BM this shift w/o success. Lap sites CDI, OLEKSANDR site CDI. Pt performing his own drain cares w/ RN supervision. PO intake encouraged as pt has not been eating much. Needed to be encouraged to do activity overnight, pt is aware this will help w/ BM but still seems hesitant.      Lola Rees, RN on 2/24/2023 at 6:47 AM

## 2023-02-24 NOTE — PROGRESS NOTES
[facility]    Progress Note - Emergency General Surgery Service       Date of Admission:  2/21/2023    Assessment & Plan: Surgery   Karlos Terrazas is a 37 year old male otherwise healthy with acute appendicitis. He underwent laparoscopy on 2/22 where it was determined that his appendix could not be safely identified and removed, thus a drain was placed and the case was aborted. Pt has been stable overnight and without any change in his abdominal exams.     - Will discontinue IV abx and switch to PO (augmentin)   - Suppository today to facilitate BM  - CBC ordered due to continued pain in abdomen   - Ordered Toradol PRN  - Ambulate often today to facilitate recovery.   - Regular diet, pt advised to take it slow   - Once pt tolerating PO switch to PO abx  - Multimodal pain control.   - Drain will be removed today or tomorrow.        Diet: Regular Diet Adult  Snacks/Supplements Adult: Ensure Enlive; Between Meals    DVT Prophylaxis: Pneumatic Compression Devices  Talavera Catheter: Not present  Lines: None     Drains:      - 1 Closed/Suction Drain(s)   Cardiac Monitoring: None  Code Status: Full Code      Clinically Significant Risk Factors   None                             Disposition Plan    Once pt tolerating PO intake w/o issues can likely D/C    The patient's care was discussed with the Chief Resident/Fellow.    Ja Wilson  [facility]  Text page via [facility] Paging/Directory     I saw and evaluated the patient concurrently with the care team and agree with the student's assessment and plan as written above. I was present at all times for any mentioned procedures.      Please page if questions,     Avery Franco MD  Surgical Resident  #[id number]  ______________________________________________________________________    Interval History   - Continues to have RLQ pain and intermittent nausea  - No bowel movement but  is passing some gas   - Encouraged to ambulate often today     Physical Exam   Vital Signs: Temp: 97.8  F (36.6  C) Temp src: Temporal BP: 122/77 Pulse: 70   Resp: 16 SpO2: 99 % O2 Device: None (Room air)    Weight: 141 lbs 1.51 oz    Intake/Output Summary (Last 24 hours) at 2/23/2023 0758  Last data filed at 2/23/2023 0346  Gross per 24 hour   Intake 1126.67 ml   Output 2070 ml   Net -943.33 ml     Constitutional: awake, alert, cooperative, no apparent distress, and appears stated age  Respiratory: No increased work of breathing, good air exchange.   Cardiovascular: regular rate and rhythm  GI: Abdomen soft, RLQ tender, non-distended, OLEKSANDR drain w/ scant serosanguinous output.   Skin: normal skin color, texture, turgor    Data     I have personally reviewed the following data over the past 24 hrs:    N/A  \   10.3 (L)   / N/A     N/A N/A N/A /  99   N/A N/A N/A \       Imaging results reviewed over the past 24 hrs:   No results found for this or any previous visit (from the past 24 hour(s)).

## 2023-02-24 NOTE — PLAN OF CARE
Goal Outcome Evaluation:      Pt alert and oriented x4, VSS on RA. Denies nausea. Abdominal pain 1/10 when resting and 5-6/10  when moving, managed with PRN Tylennol and Robaxin.  Midline incision covered with prima pore dressing, OLEKSANDR drain to bulb suction with serosanguineous fluid, pt emptied drain with assistance. Bowel sounds audible on all quadrants and passing gas, no BM this shift. Walked one time on units hallways. Voiding spontaneously. Regular diet, ate yogurt and applesauce with tea - tolerating well. 2x PIV saline locked.  At 10:30 PM pt c/o abdominal pain on RLQ, applied Lidocaine patch and give third dose of Robaxin. Stopped passing gas. Encouraged patient to walk more often. Will continue with POC.    Paged provider  Can pt have simethicone? Pt c/o of gas pain   Chelsea Meneses       Plan of Care Reviewed With: patient    Overall Patient Progress: no changeOverall Patient Progress: no change

## 2023-02-24 NOTE — DISCHARGE INSTRUCTIONS
Discharge Instructions   Activity  - No strenuous exercise for 3-4 weeks    Incisions  - You may shower and get incisions wet. Just allow water to run off of the incisions.     Drain Care  - You do not have a drain.  Specific care/instructions:  Not Applicable    Medications  - Do not take any additional Tylenol (acetaminophen) while using a narcotic pain medication which includes acetaminophen  - Do not take more than 4,000mg of acetaminophen in any 24 hour period, as this can cause liver damage  - Take stool softeners such as Senna or Miralax while you are using narcotics, but stop if you develop diarrhea  - Wean yourself off of narcotic pain medications    Follow-Up:  - Call your primary care provider to touch base regarding your recent admission.    - Call or return sooner than your regularly scheduled visit if you develop any of the following: fever >101.5, uncontrolled pain, uncontrolled nausea or vomiting, as well as increased redness, swelling, or drainage from your wound.   -  A nurse from the General Surgery Clinic will contact you 24 hours, or next business day, after your discharge from the hospital.  If you have questions or to schedule a follow up appointment please call the General Surgery Clinic at [phone].  Call [phone] and ask to speak with the Surgery resident on call if you are having troubles in the evenings, at night, or on the weekend.  -  If you are receiving home care please inform your home care nurse of our contact number.

## 2023-02-25 VITALS
WEIGHT: 141.09 LBS | HEIGHT: 72 IN | DIASTOLIC BLOOD PRESSURE: 79 MMHG | BODY MASS INDEX: 19.11 KG/M2 | TEMPERATURE: 98.7 F | HEART RATE: 92 BPM | OXYGEN SATURATION: 98 % | SYSTOLIC BLOOD PRESSURE: 126 MMHG | RESPIRATION RATE: 18 BRPM

## 2023-02-25 PROCEDURE — 250N000013 HC RX MED GY IP 250 OP 250 PS 637

## 2023-02-25 PROCEDURE — 250N000011 HC RX IP 250 OP 636

## 2023-02-25 RX ORDER — HYDROXYZINE HYDROCHLORIDE 25 MG/1
25 TABLET, FILM COATED ORAL EVERY 6 HOURS PRN
Qty: 12 TABLET | Refills: 0 | Status: SHIPPED | OUTPATIENT
Start: 2023-02-25 | End: 2023-02-28

## 2023-02-25 RX ORDER — GABAPENTIN 100 MG/1
200 CAPSULE ORAL 3 TIMES DAILY
Qty: 10 CAPSULE | Refills: 0 | Status: SHIPPED | OUTPATIENT
Start: 2023-02-25 | End: 2023-05-10

## 2023-02-25 RX ORDER — POLYETHYLENE GLYCOL 3350 17 G/17G
17 POWDER, FOR SOLUTION ORAL DAILY
Status: DISCONTINUED | OUTPATIENT
Start: 2023-02-25 | End: 2023-02-25 | Stop reason: HOSPADM

## 2023-02-25 RX ADMIN — HYDROXYZINE HYDROCHLORIDE 25 MG: 25 TABLET, FILM COATED ORAL at 00:07

## 2023-02-25 RX ADMIN — ACETAMINOPHEN 650 MG: 325 TABLET ORAL at 12:56

## 2023-02-25 RX ADMIN — ONDANSETRON 4 MG: 4 TABLET, ORALLY DISINTEGRATING ORAL at 00:07

## 2023-02-25 RX ADMIN — ACETAMINOPHEN 650 MG: 325 TABLET ORAL at 16:50

## 2023-02-25 RX ADMIN — GABAPENTIN 200 MG: 100 CAPSULE ORAL at 13:00

## 2023-02-25 RX ADMIN — SIMETHICONE 80 MG: 80 TABLET, CHEWABLE ORAL at 00:20

## 2023-02-25 RX ADMIN — ACETAMINOPHEN 650 MG: 325 TABLET ORAL at 00:07

## 2023-02-25 RX ADMIN — GABAPENTIN 200 MG: 100 CAPSULE ORAL at 08:41

## 2023-02-25 RX ADMIN — ACETAMINOPHEN 650 MG: 325 TABLET ORAL at 04:31

## 2023-02-25 RX ADMIN — AMOXICILLIN AND CLAVULANATE POTASSIUM 1 TABLET: 875; 125 TABLET ORAL at 08:41

## 2023-02-25 RX ADMIN — ACETAMINOPHEN 650 MG: 325 TABLET ORAL at 08:47

## 2023-02-25 ASSESSMENT — ACTIVITIES OF DAILY LIVING (ADL)
ADLS_ACUITY_SCORE: 22

## 2023-02-25 NOTE — PLAN OF CARE
VSS on RA. Pain managed with PRN Tylenol. No c/o nausea. Ambulating in halls. Had x3 BM's this shift. Voiding. OLEKSANDR drain to bulb suction, scant serous drainage out. Tolerating small amounts of regular diet, having supplemental drinks. Continue to monitor and with POC.

## 2023-02-25 NOTE — PLAN OF CARE
"21:00-23:30    Goal Outcome Evaluation:    Plan of Care Reviewed With: patient    Overall Patient Progress: improving    Temp: 98.5  F (36.9  C) Temp src: Oral BP: 117/76 Pulse: 74   Resp: 16 SpO2: 98 % O2 Device: None (Room air)      Status:  S/P Lap Appy on 2/22    -last BM on 2/20, on scheduled Senna,  offered suppository but patient stated that he would rather wait until morning  -voiding good amount  -rates pain at a \"3\" out of 10,wants to wait until midnight to get Tylenol and Atarax.  -ambulates with SBA  -PIV saline locked  -OLEKSANDR with small output    Possible discharge tomorrow to home.  Continue with plan of care.    .           "

## 2023-02-25 NOTE — PROGRESS NOTES
[facility]    Progress Note - Emergency General Surgery Service       Date of Admission:  2/21/2023    Assessment & Plan: Surgery   Karlos Terrazas is a 37 year old male otherwise healthy with acute appendicitis. He underwent laparoscopy on 2/22 where it was determined that his appendix could not be safely identified and removed, thus a drain was placed and the case was aborted.     Pt has been stable overnight after switching to PO Augmentin. Still having some ongoing nausea with pain med use. No BM since admission though passing flatus. Ideally would see a BM prior to him going home and tolerating more PO intake, however patient pushing for discharge. Will reassess in the PM - if doing well can consider discharge home.    - Continue Augmentin (Day 2/7)  - Added Milk of Magnesia  - Ambulate often today to facilitate recovery.   - Regular diet, pt advised to take it slow   - Multimodal pain control.   - Drain will be removed today.      Diet: Regular Diet Adult  Snacks/Supplements Adult: Ensure Enlive; Between Meals  Diet    DVT Prophylaxis: Pneumatic Compression Devices  Talavera Catheter: Not present  Lines: None     Drains:      - 1 Closed/Suction Drain(s)   Cardiac Monitoring: None  Code Status: Full Code      Clinically Significant Risk Factors   None                             Disposition Plan    Once pt tolerating PO intake w/o issues can likely D/C    The patient's care was discussed with the Chief Resident/Fellow.    Lázaro Strange MD  [facility]  Text page via [facility] Paging/Directory     I saw and evaluated the patient concurrently with the care team and agree with the student's assessment and plan as written above. I was present at all times for any mentioned procedures.      Please page if questions,     Avery Franco MD  Surgical  Resident  #[id number]  ______________________________________________________________________    Interval History   - Continues to have RLQ pain and intermittent nausea  - Still no bowel movement but is passing some gas   - Limited PO intake  - Encouraged to ambulate often today     Physical Exam   Vital Signs: Temp: 97.7  F (36.5  C) Temp src: Temporal BP: 121/78 Pulse: 79   Resp: 18 SpO2: 100 % O2 Device: None (Room air)    Weight: 141 lbs 1.51 oz    Intake/Output Summary (Last 24 hours) at 2/23/2023 0758  Last data filed at 2/23/2023 0346  Gross per 24 hour   Intake 1126.67 ml   Output 2070 ml   Net -943.33 ml     General: Alert, resting comfortably in NAD/NTA. Cooperative  HEENT: NC/AT, sclerae anicteric.Oral mucosa moist  Neck: Trachea midline  Pulm: NLB on RA, no tachypnea/dyspnea  CV: RRR by RP, non-cyanotic, no LE edema.  ABD: abdomen soft, RLQ tender, non-distended, OLEKSANDR drain w/ scant serosanguinous output.  Extrem: MA4E, no obvious deformities  Neuro:  A/Ox3, NFD  Skin: Warm and well perfused    Data     I have personally reviewed the following data over the past 24 hrs:    11.3 (H)  \   12.4 (L)   / 586 (H)     N/A N/A N/A /  N/A   N/A N/A N/A \       Imaging results reviewed over the past 24 hrs:   No results found for this or any previous visit (from the past 24 hour(s)).

## 2023-02-25 NOTE — PLAN OF CARE
Goal Outcome Evaluation: ongoing    Admitted: acute appendicitis. He underwent laparoscopy on 2/22 where it was determined that his appendix could not be safely identified and removed, thus a drain was placed and the case was aborted.    AVSS.  98% RA.  Pain somewhat managed with tylenol, atarax, simethicone.  Declined narcotics.  Some nausea, zofran x1 with some relief.  PIV SL.  Lap sites JULIUS.  OLEKSANDR patent.    Abd distended - pt c/o constipation - enc all bowel meds this AM.  Passed some gas - no BM yet.  Up with SBA.  Voiding spont.    Cont with POC.  Possible discharge.

## 2023-02-25 NOTE — DISCHARGE SUMMARY
Discharge paperwork was reviewed with patient. Pt expressed understanding. A copy was given to patient. Pt discharge home, wife is with him and will be driving pt home. Discharge medications were picked up by a staff from discharge pharmacy, located on the second floor of the hospital and given to the patient. All patient belongings were taken with patient.

## 2023-02-27 ENCOUNTER — PATIENT OUTREACH (OUTPATIENT)
Dept: SURGERY | Facility: CLINIC | Age: 38
End: 2023-02-27
Payer: COMMERCIAL

## 2023-02-27 NOTE — PROGRESS NOTES
02/27/23    9:39 AM     Karlos Terrazas is a patient of Dr. Fabiola Miranda that underwent diagnostic laparoscopy, lysis of adhesions, drain placement approximately 5 days ago (2/22).  Attempted to contact patient via telephone for a status update and review post-op  teaching.  LM on  to call office.  Await return call.      Of note:  Wound:  Steri-strips  Follow-up:  3/6/23 at 10am with Dr. Tafyoa needs Colonoscopy with GI in 4-6 weeks (phone number [phone]), needs CT scan in 4-6 weeks (ordered)  Restrictions:  - No lifting, pushing, pulling more than 15-20 pounds for 3-4 weeks  New medications: Robaxin, Augmentin, Tylenol, Atarax, gabapentin  Equipment/Supplies:  None    ADDENDUM  02/28/23  9:48 AM    RN Post-Op/Post-Discharge Care Coordination Note    Spoke with Patient.    Support  Patient able to care for self independently     Health Status  Nausea/Vomiting: Patient denies nausea/vomiting.  Eating/drinking: Patient is able to eat and drink without any complaints.  Bowel habits: Patient reports having a normal bowel movement.  Fevers/chills: Patient denies any fever or chills.  Incisions: Patient denies any signs and symptoms of infection. Wound closure: skin glue. OLEKSANDR drain was removed prior to discharge per patient report.   Pain: tolerable  New Medications: Robaxin, Augmentin, Tylenol, Atarax, gabapentin    Activity/Restrictions  No lifting in excess of 15-20 pounds for 3-4 weeks    Equipment  None    Pathology reviewed with patient:  N/A    Forms/Letters  Unsure, he will call if he needs anything    All of his questions were answered including reviewing restrictions and wound care.  He will call this office if he has any further questions and/or concerns.      Patient aware of his appointment on Monday 3/6 10am with Dr. Tafoya. He would like to wait to schedule his colonoscopy and CT scan until after he meets with Dr. Tafoya.    A copy of this note was routed to the primary surgeon.      Whom and When  to Call  Patient acknowledges understanding of how to manage any medication changes and when to seek medical care.     Patient advised that if after hour medical concerns arise to please call [phone] and choose option 4 to speak to the physician on call.

## 2023-03-02 ENCOUNTER — PATIENT OUTREACH (OUTPATIENT)
Dept: SURGERY | Facility: CLINIC | Age: 38
End: 2023-03-02
Payer: COMMERCIAL

## 2023-03-02 NOTE — TELEPHONE ENCOUNTER
REFERRAL INFORMATION:    Referring Provider:      Referring Clinic:      Reason for Visit/Diagnosis: ** PO, aborted appy, plan for interval appy       FUTURE VISIT INFORMATION:    Appointment Date: 3/8/23    Appointment Time: 8:15AM     NOTES RECORD STATUS  DETAILS   HOSPITAL DISCHARGE SUMMARY/ ED VISITS  Internal [facility]: 2/21/23 - 2/25/23   OPERATIVE REPORT Internal 2/22/23 DIAGNOSTIC LAPAROSCOPY, LYSIS OF ADHESION, DRAIN PLACEMENT

## 2023-03-02 NOTE — PROGRESS NOTES
Called patient to reschedule his PO appointment because Dr. Tafoya is going to be out unexpectedly on 3/6. Rescheduled patient to 3/8 at 0815 with Dr. Jansen via video.

## 2023-03-04 ENCOUNTER — TELEPHONE (OUTPATIENT)
Dept: SURGERY | Facility: CLINIC | Age: 38
End: 2023-03-04
Payer: COMMERCIAL

## 2023-03-04 NOTE — TELEPHONE ENCOUNTER
Cross Cover telephone note  March 4, 2023    CC: Med question     Patient's wife called in informing us that Mr. Terrazas's acute surgical pain had resolved from prior, however he continues to have the ongoing chronic low-level RLQ pain pain that he had noted to be present for the past 6-8 weeks.     Of note, the patient developed acute worsening of RLQ pain on top of this pain prompting his presentation to the urgent care/ED where he was found to have acute appendicitis. Do to the significant difficulty with inflammation intra-op, the appendectomy was aborted to continue with non-operative management.     The patient's wife was requesting refills of the Gabapentin hed been discharged with noting that this medication had finally seemed to help the patient's chronic pain. Without it, the patient's functional status has remained poor according to the wife. He remains able to eat w/o N/V. No ongoing fevers, chills, no CP, or SOB. No constipation or diarrhea - having normal BMs and voiding appropriately. No significant warning signs.     I went over warning signs with the patient for acute abdominal pain especially in the setting of recent surgery. I explained that unfortunately, I am unable to assess a patient over the phone and in the setting of recent surgery with increasing/uncontrolled pain, that we recommend they come in to the clinic or ED if unable to be evaluated. I also noted that we tend to avoid writing further pain meds for the above mentioned reasons - and that the pharmacy here at the  was already closed to send a script refill. They did not feel the pain was significant enough to warrant a return to the ED. Upon asking if they had followed up with their PCP for post-hospitalization follow up, they stated that they had not - we again encouraged them to do so for ongoing evaluation of this chronic pain. The patient does have follow up scheduled with Dr. Jansen on 3/8, and a meeting with GI in the near  future for further evaluation of this pain. I again encouraged him to keep these appointments and to again reach out to his PCP for an appointment. We will have the clinic reach out on Monday to check in on the patient.     The patient and his wife expressed understanding and agreed with the current plan.     This was discussed with the chief resident on call who agreed with the plan as stated.     Please page if questions,    Lázaro Strange MD, MS  PGY-2, General Surgery

## 2023-03-06 ENCOUNTER — PATIENT OUTREACH (OUTPATIENT)
Dept: SURGERY | Facility: CLINIC | Age: 38
End: 2023-03-06

## 2023-03-06 NOTE — PROGRESS NOTES
Dr. Strange requested patient be called today for a status update on his pain. Patient states his pain has improved since he spoke with the resident on 3/4. He does not feel he needs anything additional for pain control at this time. He is taking Tylenol which is working well for him so far. He has a video visit with Dr. Jansen on 3/8 at 0815. All questions were answered.

## 2023-03-07 ENCOUNTER — TELEPHONE (OUTPATIENT)
Dept: GASTROENTEROLOGY | Facility: CLINIC | Age: 38
End: 2023-03-07
Payer: COMMERCIAL

## 2023-03-07 NOTE — TELEPHONE ENCOUNTER
Screening Questions  BLUE  KIND OF PREP RED  LOCATION [review exclusion criteria] GREEN  SEDATION TYPE        y Are you active on mychart?       Salvador Ordering/Referring Provider?        [employer]BS What type of coverage do you have?      n Have you had a positive covid test in the last 14 days?     19.0 1. BMI  [BMI 40+ - review exclusion criteria]    y  2. Are you able to give consent for your medical care? [IF NO,RN REVIEW]          n  3. Are you taking any prescription pain medications on a routine schedule   (ex narcotics: oxycodone, roxicodone, oxycontin,  and percocet)? [RN Review]        n  3a. EXTENDED PREP What kind of prescription?     n 4. Do you have any chemical dependencies such as alcohol, street drugs, or methadone?        **If yes 3- 5 , please schedule with MAC sedation.**          IF YES TO ANY 6 - 10 - HOSPITAL SETTING ONLY.     n 6.   Do you need assistance transferring?     n 7.   Have you had a heart or lung transplant?    n 8.   Are you currently on dialysis?   n 9.   Do you use daily home oxygen?   n 10. Do you take nitroglycerin?   10a. n If yes, how often?     11. [FEMALES]   Are you currently pregnant?    11a.  If yes, how many weeks? [ Greater than 12 weeks, OR NEEDED]    n 12. Do you have Pulmonary Hypertension? *NEED PAC APPT AT [facility] w/ MAC*     n 13. [review exclusion criteria]  Do you have any implantable devices in your body (pacemaker, defib, LVAD)?    n 14. In the past 6 months, have you had any heart related issues including cardiomyopathy or heart attack?     14a. n If yes, did it require cardiac stenting if so when?     n 15. Have you had a stroke or Transient ischemic attack (TIA - aka  mini stroke ) within 6 months?      n 16. Do you have mod to severe Obstructive Sleep Apnea?  [Hospital only]    n 17. Do you have SEVERE AND UNCONTROLLED asthma? *NEED PAC APPT AT [facility] w/MAC*     18. Are you currently taking any blood thinners?     18a. No. Continue to 19.   18b.     n 19.  "Do you take the medication Phentermine?    19a. If yes, \"Hold for 7 days before procedure.  Please consult your prescribing provider if you have questions about holding this medication.\"     n  20. Do you have chronic kidney disease?      n  21. Do you have a diagnosis of diabetes?     n  22. On a regular basis do you go 3-5 days between bowel movements?      23. Preferred LOCAL Pharmacy for Pre Prescription    [ LIST ONLY ONE PHARMACY]     [facility]        - CLOSING REMINDERS -    Informed patient they will need an adult    Cannot take any type of public or medical transportation alone    Conscious Sedation- Needs  for 6 hours after the procedure       MAC/General-Needs  for 24 hours after procedure    Pre-Procedure Covid test to be completed [[facility] PCR Testing Required]    Confirmed Nurse will call to complete assessment       - SCHEDULING DETAILS -  n Hospital Setting Required? If yes, what is the exclusion?:    Maria De Jesus  Surgeon    4/28/23  Date of Procedure  Lower Endoscopy [Colonoscopy]  Type of Procedure Scheduled  [facility]-If you answer yes to questions #8, #20, #21Which Colonoscopy Prep was Sent?     CS Sedation Type     n PAC / Pre-op Required                 "

## 2023-03-08 ENCOUNTER — VIRTUAL VISIT (OUTPATIENT)
Dept: SURGERY | Facility: CLINIC | Age: 38
End: 2023-03-08
Payer: COMMERCIAL

## 2023-03-08 ENCOUNTER — PRE VISIT (OUTPATIENT)
Dept: SURGERY | Facility: CLINIC | Age: 38
End: 2023-03-08

## 2023-03-08 ENCOUNTER — TELEPHONE (OUTPATIENT)
Dept: GASTROENTEROLOGY | Facility: CLINIC | Age: 38
End: 2023-03-08

## 2023-03-08 ENCOUNTER — TELEPHONE (OUTPATIENT)
Dept: SURGERY | Facility: CLINIC | Age: 38
End: 2023-03-08

## 2023-03-08 VITALS — BODY MASS INDEX: 18.96 KG/M2 | HEIGHT: 72 IN | WEIGHT: 140 LBS

## 2023-03-08 DIAGNOSIS — Z98.890 POST-OPERATIVE STATE: Primary | ICD-10-CM

## 2023-03-08 DIAGNOSIS — K35.30 ACUTE APPENDICITIS WITH LOCALIZED PERITONITIS, WITHOUT GANGRENE OR ABSCESS, UNSPECIFIED WHETHER PERFORATION PRESENT: ICD-10-CM

## 2023-03-08 PROCEDURE — 99024 POSTOP FOLLOW-UP VISIT: CPT | Mod: VID | Performed by: SURGERY

## 2023-03-08 RX ORDER — ACETAMINOPHEN 500 MG
500-1000 TABLET ORAL EVERY 6 HOURS PRN
COMMUNITY
End: 2023-05-10

## 2023-03-08 RX ORDER — CEFAZOLIN SODIUM 2 G/50ML
2 SOLUTION INTRAVENOUS SEE ADMIN INSTRUCTIONS
Status: CANCELLED | OUTPATIENT
Start: 2023-03-08

## 2023-03-08 RX ORDER — CEFAZOLIN SODIUM 2 G/50ML
2 SOLUTION INTRAVENOUS
Status: CANCELLED | OUTPATIENT
Start: 2023-03-08

## 2023-03-08 ASSESSMENT — PAIN SCALES - GENERAL: PAINLEVEL: NO PAIN (0)

## 2023-03-08 NOTE — TELEPHONE ENCOUNTER
Patient responded to [employer] message. Requesting sooner procedure date.  [place] [facility] [place]. Patient will call back to confirm after talking to his ride.          Liztic Message:     I would like to do this procedure ASAP, please let me know if any openings become available before April 28th. Thank you!

## 2023-03-08 NOTE — TELEPHONE ENCOUNTER
Caller: Karlos  Reason for Reschedule/Cancellation (please be detailed, any staff messages or encounters to note?): patient      Prior to reschedule please review:    Ordering Provider:Salvador    Sedation per order:CS    Does patient have any ASC Exclusions, please identify?: N      Notes on Cancelled Procedure:    Procedure:Lower Endoscopy [Colonoscopy]     Date: 4/28/23    Location:[facility]; [address] [place] [address], [address] [place] [address]    Surgeon: Maria De Jesus        Rescheduled: Yes    Procedure: Lower Endoscopy [Colonoscopy]     Date: 4/7/23    Location:[facility]; [address] [place] [address], [place] [address]    Surgeon: Urszula    Sedation Level Scheduled  MAC,  Reason for Sedation Level MAC block    Prep/Instructions updated and sent: YELENA

## 2023-03-08 NOTE — TELEPHONE ENCOUNTER
[facility] Call Center    Phone Message    May a detailed message be left on voicemail: yes     Reason for Call: Other: Karlos is calling in asking to get a message to Dr. Jansen's team. Pt states that he has been in the virtual waiting room on [employer] for his appt to start since 8:10a, but nobody has joined the call. Please call back to discuss.     Action Taken: Message routed to:  [facility] ([facility] Gen Surg    Travel Screening: Not Applicable

## 2023-03-08 NOTE — LETTER
3/8/2023       RE: Karlos Terrazas  [address] [place] [address]     Dear Colleague,    Thank you for referring your patient, Karlos Terrazas, to the [facility] [place] [facility]. Please see a copy of my visit note below.    I spoke with Karlos Terrazas today in planned follow-up of his recent admission and surgery for complicated appendicitis.  He was seen in the ED after 5 days of RLQ pain, had a CT scan showing appendicitis without abscess. Was taken to the OR and a very inflamed RLQ was encountered- operation was aborted and a drain placed.  He was continued on ABX and the drain removed prior to discharge.  He was referred to GI for colonoscopy to evaluate for inflammatory bowel disease as well (presumably due to duration of symptoms and intraoperative findings).    Today is my first time with Karlos.    He reports doing well. Tolerating a diet, no abdominal pain. Having loose stools while still on ABX.  He has been on ABX for just over 2 weeks at this time.  He had never had abdominal surgery prior to his laparoscopy 2 weeks ago.  No family history of colon/intestinal issues/malignancy.    PE:  A+Ox3, NAD  Pleasant  Fluent speech  No resp distress  No jaundice    A/P:  Recovering appropriately after aborted lap appy.  He and I discussed interval appendectomy (vs observation).  He would like to proceed with appendectomy.  He is scheduled for a colonoscopy in April which is fine.  We will plan to schedule the surgery for after this.    I also told him to stop his ABX now- given the just over 14 day course he has already had.  We did discuss the potential for recurrent infection- but I do not think a longer course will prevent this.  He was in agreement.    I do not think he requires additional imaging at this time- if he develops signs of an abscess we will order one though.    -To OR in May for appendectomy  -Stop  ABX  -Continue with colonoscopy plan      Landon Patino, REVA 3/8/2023      7:03 AM        Sincerely,    Karlos Jansen MD

## 2023-03-08 NOTE — NURSING NOTE
Chief Complaint   Patient presents with     New Patient     Post-op, aborted appy, plan for interval appy       Vitals:    03/08/23 0702   Weight: 63.5 kg (140 lb)   Height: 1.829 m (6')       Body mass index is 18.99 kg/m .                          Landon Patino, EMT

## 2023-03-08 NOTE — PATIENT INSTRUCTIONS
You met with Dr. Karlos Jansen.      Today's visit instructions:    Reminder: Surgery Requirements  Your surgery will be at [address] [place] [address]  You will need to arrive 2 hours early.  You will need someone to drive you home (over 18 years old) and stay with you for 24 hours after the procedure.  You will need a preop physical with your regular doctor within 30 days of surgery- closer is always better.  Stop any blood thinners, vitamins, minerals, or herbal supplements 5 days before surgery.  If you are taking a prescribed blood thinner please let us know for specific instructions.  Fasting- a nurse from [facility] will call you 1-2 days before surgery to confirm your procedure and tell you when to stop eating and drinking.   Wash with the soap (Antibacterial, Dial Complete Foam, Hibiclense, or soap given/mailed from the clinic) the night before surgery and morning of surgery. See instructions in the Surgery Packet.  If you would like a procedure estimate please call Cost of Care at [phone].       If you have questions please contact Ela RN or Madelyn RN during regular clinic hours, Monday through Friday 7:30 AM - 4:00 PM, or you can contact us via [employer] at anytime.       If you have urgent needs after-hours, weekends, or holidays please call the hospital at [phone] and ask to speak with our on-call General Surgery Team.    Appointment [phone]  Nurse Advice (Ela or Madelyn): [phone]   Surgery Scheduler (Kailey): [phone]  Fax: [phone]    After your Laparoscopic Appendectomy        Incision care   You may take a shower the day after surgery. Carefully wash your incision with soap and water. Do not submerge yourself in water (bath, whirlpool, hot tub, pool, lake) for 14 days after surgery.   Remove the bandage the day after surgery, but leave the medical tape (Steri-Strips) or glue in place. These will loosen and fall off on their own 1-2 weeks after surgery.     Always  wash your hands before touching your incisions or removing bandages.   It is not unusual to form a collection of fluid or blood under your incision that may feel firm or squishy- it can take several weeks to months for your body to reabsorb it.  At times, it may even drain.  If that should happen keep the area clean with soap, water,  and cover with a clean gauze dressing. You can change this daily or as needed.       Other medicines   Wait to start aspirin or blood thinners until the day after surgery. You can continue your regular medicines at your normal time the day after surgery.    Your pain medicine may cause constipation (hard, dry stools). To help with this, take the stool softener your doctor gave you or an over-the-counter stool softener or laxative. You can stop taking this when you are no longer taking pain medicine and your bowel movements are back to normal.      For pain or discomfort   Take the narcotic pain medicine your doctor gave you as needed and as instructed on the bottle. If you prefer to use over-the-counter medication, use acetaminophen (Tylenol) or ibuprofen (Advil, Motrin) as instructed on the box. Do not take Tylenol if it is in your narcotic pain medication.   Use an ice pack on your abdomen (belly) for 20 minutes at a time as needed for the first 24 hours. Be sure to protect your skin by putting a cloth between the ice pack and your skin.   After 24 hours you can switch to heat for 20 minutes as needed. Be sure to protect your skin by putting a cloth between the heat pack and your skin.   You may experience right shoulder pain after surgery which will go away 1-4 days after your procedure.  This is related to the gas that was used to inflate your abdomen, it gets trapped between your liver and diaphragm.  Walk frequently and apply a heating pad to the area (protecting your skin from the heating pad with a barrier such as a towel.     Activities   No driving until you feel it s safe to  do so. Don t drive while taking narcotic pain medicine.   Don t lift anything heavier than 20 pounds for 3 to 4 weeks after surgery.      Special equipment   None     Diet   You can eat your regular meals after surgery.      When to call the doctor   Call your doctor if you have:   A fever above 101 F (38.3 C) (taken under the tongue), or a fever or chills lasting more than a day.   Redness at the incision site.   Any fluid or blood draining from the incision, especially if it smells bad.    Severe pain that doesn t improve with pain medicine.        We will call you 2 to 4 days after surgery to review this handout, answer questions and help arrange after-surgery care. If you have questions or concerns, please call [phone] during regular office hours. If you need to call after business hours, call [phone] and ask to page the surgeon on-call.

## 2023-03-08 NOTE — PROGRESS NOTES
Karlos Terrazas is a 37 year old who is being evaluated via a billable video visit.      How would you like to obtain your AVS? MyChart  If the video visit is dropped, the invitation should be resent by: Text to cell phone: [phone]  Will anyone else be joining your video visit? No  If patient encounters technical issues they should call [phone]    During this virtual visit the patient is located in [place], patient verifies this as the location during the entirety of this visit.     Video-Visit Details  Video Start Time: 0845    Type of service:  Video Visit    Video End Time:8:59 AM    Originating Location (pt. Location): Home    Distant Location (provider location):  On-site    Platform used for Video Visit: Anais     I spoke with Karlos Terrazas today in planned follow-up of his recent admission and surgery for complicated appendicitis.  He was seen in the ED after 5 days of RLQ pain, had a CT scan showing appendicitis without abscess. Was taken to the OR and a very inflamed RLQ was encountered- operation was aborted and a drain placed.  He was continued on ABX and the drain removed prior to discharge.  He was referred to GI for colonoscopy to evaluate for inflammatory bowel disease as well (presumably due to duration of symptoms and intraoperative findings).    Today is my first time with Karlos.    He reports doing well. Tolerating a diet, no abdominal pain. Having loose stools while still on ABX.  He has been on ABX for just over 2 weeks at this time.  He had never had abdominal surgery prior to his laparoscopy 2 weeks ago.  No family history of colon/intestinal issues/malignancy.    PE:  A+Ox3, NAD  Pleasant  Fluent speech  No resp distress  No jaundice    A/P:  Recovering appropriately after aborted lap appy.  He and I discussed interval appendectomy (vs observation).  He would like to proceed with appendectomy.  He is scheduled for a colonoscopy in April which is fine.  We will plan to schedule the  surgery for after this.    I also told him to stop his ABX now- given the just over 14 day course he has already had.  We did discuss the potential for recurrent infection- but I do not think a longer course will prevent this.  He was in agreement.    I do not think he requires additional imaging at this time- if he develops signs of an abscess we will order one though.    -To OR in May for appendectomy  -Stop ABX  -Continue with colonoscopy plan            Landon Patino, REVA 3/8/2023      7:03 AM

## 2023-03-10 ENCOUNTER — TELEPHONE (OUTPATIENT)
Dept: SURGERY | Facility: CLINIC | Age: 38
End: 2023-03-10
Payer: COMMERCIAL

## 2023-03-10 ENCOUNTER — HOSPITAL ENCOUNTER (OUTPATIENT)
Facility: CLINIC | Age: 38
End: 2023-03-10
Attending: SURGERY | Admitting: SURGERY
Payer: COMMERCIAL

## 2023-03-10 NOTE — TELEPHONE ENCOUNTER
Patient is scheduled for surgery with Dr. Jansen    Spoke with: Karlos    Date of Surgery: 4/20/2023    Location: [facility]    Informed patient they will need an adult  Yes    Pre op with Provider n/a    H&P: Scheduled with PCP - Dr Karlos Schulz out of [facility]    Pre-procedure COVID-19 Test: n/a    Additional imaging/appointments: n/a    Surgery packet: To be sent out via [employer]     Additional comments: n/a

## 2023-03-23 ENCOUNTER — TELEPHONE (OUTPATIENT)
Dept: GASTROENTEROLOGY | Facility: CLINIC | Age: 38
End: 2023-03-23
Payer: COMMERCIAL

## 2023-03-23 DIAGNOSIS — R10.84 ABDOMINAL PAIN, GENERALIZED: Primary | ICD-10-CM

## 2023-03-23 RX ORDER — BISACODYL 5 MG/1
TABLET, DELAYED RELEASE ORAL
Qty: 4 TABLET | Refills: 0 | Status: SHIPPED | OUTPATIENT
Start: 2023-03-23 | End: 2023-05-10

## 2023-03-23 NOTE — TELEPHONE ENCOUNTER
Attempted to contact patient regarding upcoming Colonoscopy  procedure on 4.7.23 for pre assessment questions. No answer.     Left message to return call to [phone] #4    Discuss Covid policy and designated  policy.    Pre op exam? N/A    Arrival time: 0730. Procedure time: 0830    [facility] [address] [place] [address], [address] Floor, [place] [address]    Sedation type: MAC    Anticoagulants: No    Electronic implanted devices? No    Diabetic? No    Indication for procedure: abdominal pain    Bowel prep recommendation: Standard Golytely      Prep instructions sent via [employer]. Bowel prep script sent to [facility] [place], [place] - [address] Ramirez RN  Endoscopy Procedure Pre Assessment RN

## 2023-03-30 ENCOUNTER — LAB REQUISITION (OUTPATIENT)
Dept: LAB | Facility: CLINIC | Age: 38
End: 2023-03-30

## 2023-03-30 LAB
ALBUMIN SERPL BCG-MCNC: 3.8 G/DL (ref 3.5–5.2)
ALBUMIN UR-MCNC: 10 MG/DL
ALP SERPL-CCNC: 96 U/L (ref 40–129)
ALT SERPL W P-5'-P-CCNC: 14 U/L (ref 10–50)
ANION GAP SERPL CALCULATED.3IONS-SCNC: 14 MMOL/L (ref 7–15)
APPEARANCE UR: CLEAR
AST SERPL W P-5'-P-CCNC: 16 U/L (ref 10–50)
BASOPHILS # BLD AUTO: 0.1 10E3/UL (ref 0–0.2)
BASOPHILS NFR BLD AUTO: 1 %
BILIRUB SERPL-MCNC: <0.2 MG/DL
BILIRUB UR QL STRIP: NEGATIVE
BUN SERPL-MCNC: 11.7 MG/DL (ref 6–20)
CALCIUM SERPL-MCNC: 9.7 MG/DL (ref 8.6–10)
CHLORIDE SERPL-SCNC: 101 MMOL/L (ref 98–107)
COLOR UR AUTO: YELLOW
CREAT SERPL-MCNC: 0.91 MG/DL (ref 0.67–1.17)
CRP SERPL-MCNC: 38.1 MG/L
DEPRECATED HCO3 PLAS-SCNC: 26 MMOL/L (ref 22–29)
EOSINOPHIL # BLD AUTO: 0.2 10E3/UL (ref 0–0.7)
EOSINOPHIL NFR BLD AUTO: 3 %
ERYTHROCYTE [DISTWIDTH] IN BLOOD BY AUTOMATED COUNT: 14.6 % (ref 10–15)
GFR SERPL CREATININE-BSD FRML MDRD: >90 ML/MIN/1.73M2
GLUCOSE SERPL-MCNC: 118 MG/DL (ref 70–99)
GLUCOSE UR STRIP-MCNC: NEGATIVE MG/DL
HCT VFR BLD AUTO: 39.7 % (ref 40–53)
HGB BLD-MCNC: 11.6 G/DL (ref 13.3–17.7)
HGB UR QL STRIP: NEGATIVE
IMM GRANULOCYTES # BLD: 0 10E3/UL
IMM GRANULOCYTES NFR BLD: 0 %
KETONES UR STRIP-MCNC: NEGATIVE MG/DL
LEUKOCYTE ESTERASE UR QL STRIP: NEGATIVE
LYMPHOCYTES # BLD AUTO: 2 10E3/UL (ref 0.8–5.3)
LYMPHOCYTES NFR BLD AUTO: 25 %
MCH RBC QN AUTO: 24.3 PG (ref 26.5–33)
MCHC RBC AUTO-ENTMCNC: 29.2 G/DL (ref 31.5–36.5)
MCV RBC AUTO: 83 FL (ref 78–100)
MONOCYTES # BLD AUTO: 0.6 10E3/UL (ref 0–1.3)
MONOCYTES NFR BLD AUTO: 7 %
MUCOUS THREADS #/AREA URNS LPF: PRESENT /LPF
NEUTROPHILS # BLD AUTO: 5.2 10E3/UL (ref 1.6–8.3)
NEUTROPHILS NFR BLD AUTO: 64 %
NITRATE UR QL: NEGATIVE
NRBC # BLD AUTO: 0 10E3/UL
NRBC BLD AUTO-RTO: 0 /100
PH UR STRIP: 6 [PH] (ref 5–7)
PLATELET # BLD AUTO: 571 10E3/UL (ref 150–450)
POTASSIUM SERPL-SCNC: 3.9 MMOL/L (ref 3.4–5.3)
PROT SERPL-MCNC: 6.7 G/DL (ref 6.4–8.3)
RBC # BLD AUTO: 4.77 10E6/UL (ref 4.4–5.9)
RBC URINE: 1 /HPF
SODIUM SERPL-SCNC: 141 MMOL/L (ref 136–145)
SP GR UR STRIP: 1.02 (ref 1–1.03)
SQUAMOUS EPITHELIAL: <1 /HPF
UROBILINOGEN UR STRIP-MCNC: NORMAL MG/DL
WBC # BLD AUTO: 8.1 10E3/UL (ref 4–11)
WBC URINE: 1 /HPF

## 2023-03-30 PROCEDURE — 86140 C-REACTIVE PROTEIN: CPT | Performed by: FAMILY MEDICINE

## 2023-03-30 PROCEDURE — 85025 COMPLETE CBC W/AUTO DIFF WBC: CPT | Performed by: FAMILY MEDICINE

## 2023-03-30 PROCEDURE — 81003 URINALYSIS AUTO W/O SCOPE: CPT | Performed by: FAMILY MEDICINE

## 2023-03-30 PROCEDURE — 80053 COMPREHEN METABOLIC PANEL: CPT | Performed by: FAMILY MEDICINE

## 2023-03-31 NOTE — TELEPHONE ENCOUNTER
Second attempt for pre-assessment prior to upcoming colonoscopy on 4.7.23     No answer.  Left message to return call [phone] #[phone]    MyChart message sent    Hilary Ramirez RN  Endoscopy Procedure Pre Assessment RN

## 2023-04-03 NOTE — TELEPHONE ENCOUNTER
Pt returned call.    Pre assessment questions completed for upcoming Colonoscopy  procedure scheduled on 4/7/23    COVID policy reviewed.     Reviewed procedural arrival time 0730, procedure time 0830 and facility location [facility]; [address] [place] [address], [address] Floor, [place] [address]    Designated  policy reviewed. Instructed to have someone stay 24 hours post procedure.     Pt stated they have already reviewed bowel prep instructions and had no questions. NPO instructions reviewed.    Pt verbalized understanding and had no questions at this time. Pt was instructed to call if any questions or concerns arise.       Arely Morrell RN  Endoscopy Procedure Pre Assessment RN

## 2023-04-07 ENCOUNTER — ANESTHESIA EVENT (OUTPATIENT)
Dept: SURGERY | Facility: AMBULATORY SURGERY CENTER | Age: 38
End: 2023-04-07
Payer: COMMERCIAL

## 2023-04-07 ENCOUNTER — HOSPITAL ENCOUNTER (OUTPATIENT)
Facility: AMBULATORY SURGERY CENTER | Age: 38
Discharge: HOME | End: 2023-04-07
Attending: INTERNAL MEDICINE
Payer: COMMERCIAL

## 2023-04-07 ENCOUNTER — ANESTHESIA (OUTPATIENT)
Dept: SURGERY | Facility: AMBULATORY SURGERY CENTER | Age: 38
End: 2023-04-07
Payer: COMMERCIAL

## 2023-04-07 VITALS
RESPIRATION RATE: 16 BRPM | TEMPERATURE: 97.5 F | HEIGHT: 72 IN | OXYGEN SATURATION: 100 % | BODY MASS INDEX: 18.28 KG/M2 | SYSTOLIC BLOOD PRESSURE: 101 MMHG | HEART RATE: 63 BPM | DIASTOLIC BLOOD PRESSURE: 67 MMHG | WEIGHT: 135 LBS

## 2023-04-07 VITALS — HEART RATE: 84 BPM

## 2023-04-07 LAB — COLONOSCOPY: NORMAL

## 2023-04-07 PROCEDURE — 88305 TISSUE EXAM BY PATHOLOGIST: CPT | Mod: 26 | Performed by: PATHOLOGY

## 2023-04-07 PROCEDURE — 88305 TISSUE EXAM BY PATHOLOGIST: CPT | Mod: TC | Performed by: INTERNAL MEDICINE

## 2023-04-07 PROCEDURE — 45380 COLONOSCOPY AND BIOPSY: CPT

## 2023-04-07 RX ORDER — SODIUM CHLORIDE, SODIUM LACTATE, POTASSIUM CHLORIDE, CALCIUM CHLORIDE 600; 310; 30; 20 MG/100ML; MG/100ML; MG/100ML; MG/100ML
INJECTION, SOLUTION INTRAVENOUS CONTINUOUS PRN
Status: DISCONTINUED | OUTPATIENT
Start: 2023-04-07 | End: 2023-04-07

## 2023-04-07 RX ORDER — ONDANSETRON 2 MG/ML
4 INJECTION INTRAMUSCULAR; INTRAVENOUS
Status: CANCELLED | OUTPATIENT
Start: 2023-04-07

## 2023-04-07 RX ORDER — PROPOFOL 10 MG/ML
INJECTION, EMULSION INTRAVENOUS PRN
Status: DISCONTINUED | OUTPATIENT
Start: 2023-04-07 | End: 2023-04-07

## 2023-04-07 RX ORDER — LIDOCAINE 40 MG/G
CREAM TOPICAL
Status: CANCELLED | OUTPATIENT
Start: 2023-04-07

## 2023-04-07 RX ORDER — PROCHLORPERAZINE MALEATE 10 MG
10 TABLET ORAL EVERY 6 HOURS PRN
Status: CANCELLED | OUTPATIENT
Start: 2023-04-07

## 2023-04-07 RX ORDER — PROPOFOL 10 MG/ML
INJECTION, EMULSION INTRAVENOUS CONTINUOUS PRN
Status: DISCONTINUED | OUTPATIENT
Start: 2023-04-07 | End: 2023-04-07

## 2023-04-07 RX ORDER — LIDOCAINE HYDROCHLORIDE 20 MG/ML
INJECTION, SOLUTION INFILTRATION; PERINEURAL PRN
Status: DISCONTINUED | OUTPATIENT
Start: 2023-04-07 | End: 2023-04-07

## 2023-04-07 RX ORDER — NALOXONE HYDROCHLORIDE 0.4 MG/ML
0.2 INJECTION, SOLUTION INTRAMUSCULAR; INTRAVENOUS; SUBCUTANEOUS
Status: CANCELLED | OUTPATIENT
Start: 2023-04-07

## 2023-04-07 RX ORDER — ONDANSETRON 2 MG/ML
4 INJECTION INTRAMUSCULAR; INTRAVENOUS EVERY 6 HOURS PRN
Status: CANCELLED | OUTPATIENT
Start: 2023-04-07

## 2023-04-07 RX ORDER — FLUMAZENIL 0.1 MG/ML
0.2 INJECTION, SOLUTION INTRAVENOUS
Status: CANCELLED | OUTPATIENT
Start: 2023-04-07 | End: 2023-04-07

## 2023-04-07 RX ORDER — LIDOCAINE 40 MG/G
CREAM TOPICAL
Status: DISCONTINUED | OUTPATIENT
Start: 2023-04-07 | End: 2023-04-08 | Stop reason: HOSPADM

## 2023-04-07 RX ORDER — NALOXONE HYDROCHLORIDE 0.4 MG/ML
0.4 INJECTION, SOLUTION INTRAMUSCULAR; INTRAVENOUS; SUBCUTANEOUS
Status: CANCELLED | OUTPATIENT
Start: 2023-04-07

## 2023-04-07 RX ORDER — SODIUM CHLORIDE, SODIUM LACTATE, POTASSIUM CHLORIDE, CALCIUM CHLORIDE 600; 310; 30; 20 MG/100ML; MG/100ML; MG/100ML; MG/100ML
INJECTION, SOLUTION INTRAVENOUS CONTINUOUS
Status: DISCONTINUED | OUTPATIENT
Start: 2023-04-07 | End: 2023-04-08 | Stop reason: HOSPADM

## 2023-04-07 RX ORDER — ONDANSETRON 4 MG/1
4 TABLET, ORALLY DISINTEGRATING ORAL EVERY 6 HOURS PRN
Status: CANCELLED | OUTPATIENT
Start: 2023-04-07

## 2023-04-07 RX ADMIN — LIDOCAINE HYDROCHLORIDE 60 MG: 20 INJECTION, SOLUTION INFILTRATION; PERINEURAL at 09:31

## 2023-04-07 RX ADMIN — PROPOFOL 50 MG: 10 INJECTION, EMULSION INTRAVENOUS at 09:31

## 2023-04-07 RX ADMIN — PROPOFOL 150 MCG/KG/MIN: 10 INJECTION, EMULSION INTRAVENOUS at 09:32

## 2023-04-07 RX ADMIN — SODIUM CHLORIDE, SODIUM LACTATE, POTASSIUM CHLORIDE, CALCIUM CHLORIDE: 600; 310; 30; 20 INJECTION, SOLUTION INTRAVENOUS at 09:19

## 2023-04-07 RX ADMIN — PROPOFOL 50 MG: 10 INJECTION, EMULSION INTRAVENOUS at 09:32

## 2023-04-07 NOTE — H&P
Karlos Terrazas  [id number]  male  37 year old      Reason for procedure/surgery: Suspicion for Crohn's disease    Patient Active Problem List   Diagnosis     Appendicitis, unspecified appendicitis type     Acute appendicitis with localized peritonitis and gangrene, without perforation or abscess       Past Surgical History:    Past Surgical History:   Procedure Laterality Date     LAPAROSCOPIC APPENDECTOMY N/A 2/22/2023    Procedure: DIAGNOSTIC LAPAROSCOPY, LYSIS OF ADHESION, DRAIN PLACEMENT;  Surgeon: Fabiola Miranda MD;  Location:  OR       Past Medical History: No past medical history on file.    Social History:   Social History     Tobacco Use     Smoking status: Never     Smokeless tobacco: Never   Vaping Use     Vaping status: Not on file   Substance Use Topics     Alcohol use: Yes     Comment: occasional use       Family History: History reviewed. No pertinent family history.    Allergies: No Known Allergies    Active Medications:   Current Outpatient Medications   Medication Sig Dispense Refill     acetaminophen (TYLENOL) 500 MG tablet Take 500-1,000 mg by mouth every 6 hours as needed for mild pain       bisacodyl (DULCOLAX) 5 MG EC tablet Take 2 tablets at 3 pm the day before your procedure. If your procedure is before 11 am, take 2 additional tablets at 11 pm. If your procedure is after 11 am, take 2 additional tablets at 6 am. For additional instructions refer to your colonoscopy prep instructions. 4 tablet 0     polyethylene glycol (GOLYTELY) 236 g suspension The night before the exam at 6 pm drink an 8-ounce glass every 15 minutes until the jug is half empty. If you arrive before 11 AM: Drink the other half of the Golytely jug at 11 PM night before procedure. If you arrive after 11 AM: Drink the other half of the Golytely jug at 6 AM day of procedure. For additional instructions refer to your colonoscopy prep instructions. 4000 mL 0     gabapentin (NEURONTIN) 100 MG capsule Take 2 capsules (200 mg) by  mouth 3 times daily for 3 days 10 capsule 0       Systemic Review:   ENT/MOUTH: NEGATIVE for ear, mouth and throat problems  RESP: NEGATIVE for significant cough or SOB  CV: NEGATIVE for chest pain, palpitations or peripheral edema    Physical Examination:   Vital Signs: BP 97/64   Pulse 84   Temp 97.5  F (36.4  C)   Resp 16   Ht 1.829 m (6')   Wt 61.2 kg (135 lb)   SpO2 100%   BMI 18.31 kg/m    NECK: no adenopathy, no asymmetry, masses, or scars  RESP: lungs clear to auscultation - no rales, rhonchi or wheezes  CV: regular rate and rhythm, normal S1 S2, no S3 or S4, no murmur, click or rub, no peripheral edema and peripheral pulses strong  ABDOMEN: soft, nontender, no hepatosplenomegaly, no masses and bowel sounds normal  MS: no gross musculoskeletal defects noted, no edema    Plan: Appropriate to proceed as scheduled.      Lamont Seaman MD  4/7/2023    PCP:  Karlos Schulz

## 2023-04-07 NOTE — ANESTHESIA PREPROCEDURE EVALUATION
Anesthesia Pre-Procedure Evaluation    Patient: Karlos Terrazas   MRN: [id number] : 1985        Procedure : Procedure(s):  Colonoscopy          No past medical history on file.   Past Surgical History:   Procedure Laterality Date     LAPAROSCOPIC APPENDECTOMY N/A 2023    Procedure: DIAGNOSTIC LAPAROSCOPY, LYSIS OF ADHESION, DRAIN PLACEMENT;  Surgeon: Fabiola Miranda MD;  Location: [facility] OR      No Known Allergies   Social History     Tobacco Use     Smoking status: Never     Smokeless tobacco: Never   Vaping Use     Vaping status: Not on file   Substance Use Topics     Alcohol use: Yes     Comment: occasional use      Wt Readings from Last 1 Encounters:   23 61.2 kg (135 lb)        Anesthesia Evaluation            ROS/MED HX  ENT/Pulmonary:  - neg pulmonary ROS     Neurologic:  - neg neurologic ROS     Cardiovascular:  - neg cardiovascular ROS     METS/Exercise Tolerance: >4 METS    Hematologic:  - neg hematologic  ROS     Musculoskeletal:  - neg musculoskeletal ROS     GI/Hepatic:  - neg GI/hepatic ROS     Renal/Genitourinary:  - neg Renal ROS     Endo:  - neg endo ROS     Psychiatric/Substance Use:  - neg psychiatric ROS     Infectious Disease:  - neg infectious disease ROS     Malignancy:  - neg malignancy ROS     Other:  - neg other ROS          Physical Exam    Airway  airway exam normal      Mallampati: II   TM distance: > 3 FB   Neck ROM: full   Mouth opening: > 3 cm    Respiratory Devices and Support         Dental       (+) Completely normal teeth      Cardiovascular          Rhythm and rate: regular and normal     Pulmonary   pulmonary exam normal        breath sounds clear to auscultation           OUTSIDE LABS:  CBC:   Lab Results   Component Value Date    WBC 8.1 2023    WBC 11.3 (H) 2023    HGB 11.6 (L) 2023    HGB 12.4 (L) 2023    HCT 39.7 (L) 2023    HCT 41.4 2023     (H) 2023     (H) 2023     BMP:   Lab Results   Component  Value Date     03/30/2023     02/21/2023    POTASSIUM 3.9 03/30/2023    POTASSIUM 4.8 02/21/2023    CHLORIDE 101 03/30/2023    CHLORIDE 101 02/21/2023    CO2 26 03/30/2023    CO2 28 02/21/2023    BUN 11.7 03/30/2023    BUN 10.7 02/21/2023    CR 0.91 03/30/2023    CR 1.00 02/21/2023     (H) 03/30/2023    GLC 99 02/24/2023     COAGS: No results found for: PTT, INR, FIBR  POC: No results found for: BGM, HCG, HCGS  HEPATIC:   Lab Results   Component Value Date    ALBUMIN 3.8 03/30/2023    PROTTOTAL 6.7 03/30/2023    ALT 14 03/30/2023    AST 16 03/30/2023    ALKPHOS 96 03/30/2023    BILITOTAL <0.2 03/30/2023     OTHER:   Lab Results   Component Value Date    SABRINA 9.7 03/30/2023    LIPASE 21 02/21/2023    SED 26 (H) 02/16/2023       Anesthesia Plan    ASA Status:  1   NPO Status:  NPO Appropriate    Anesthesia Type: MAC.     - Reason for MAC: immobility needed, straight local not clinically adequate   Induction: Intravenous.   Maintenance: TIVA.        Consents    Anesthesia Plan(s) and associated risks, benefits, and realistic alternatives discussed. Questions answered and patient/representative(s) expressed understanding.    - Discussed:     - Discussed with:  Patient      - Extended Intubation/Ventilatory Support Discussed: No.      - Patient is DNR/DNI Status: No    Use of blood products discussed: No .     Postoperative Care    Pain management: IV analgesics, Oral pain medications, Multi-modal analgesia.   PONV prophylaxis: Background Propofol Infusion     Comments:                Andre Pearce MD

## 2023-04-07 NOTE — ANESTHESIA POSTPROCEDURE EVALUATION
Patient: Karlos Terrazas    Procedure: Procedure(s):  COLONOSCOPY, WITH BIOPSY       Anesthesia Type:  MAC    Note:  Disposition: Outpatient   Postop Pain Control: Uneventful            Sign Out: Well controlled pain   PONV: No   Neuro/Psych: Uneventful            Sign Out: Acceptable/Baseline neuro status   Airway/Respiratory: Uneventful            Sign Out: Acceptable/Baseline resp. status   CV/Hemodynamics: Uneventful            Sign Out: Acceptable CV status   Other NRE: NONE   DID A NON-ROUTINE EVENT OCCUR? No           Last vitals:  Vitals Value Taken Time   /67 04/07/23 1028   Temp 36.4  C (97.5  F) 04/07/23 1000   Pulse 63 04/07/23 1028   Resp 16 04/07/23 1017   SpO2         Electronically Signed By: Andre Pearce MD  April 7, 2023  11:11 AM

## 2023-04-07 NOTE — ANESTHESIA CARE TRANSFER NOTE
Patient: Karlos Terrazas    Procedure: Procedure(s):  COLONOSCOPY, WITH BIOPSY       Diagnosis: Appendicitis, unspecified appendicitis type [K37]  Diagnosis Additional Information: No value filed.    Anesthesia Type:   MAC     Note:    Oropharynx: oropharynx clear of all foreign objects and spontaneously breathing  Level of Consciousness: awake  Oxygen Supplementation: room air    Independent Airway: airway patency satisfactory and stable  Dentition: dentition unchanged  Vital Signs Stable: post-procedure vital signs reviewed and stable  Report to RN Given: handoff report given  Patient transferred to: Phase II    Handoff Report: Identifed the Patient, Identified the Reponsible Provider, Reviewed the pertinent medical history, Discussed the surgical course, Reviewed Intra-OP anesthesia mangement and issues during anesthesia, Set expectations for post-procedure period and Allowed opportunity for questions and acknowledgement of understanding      Vitals:  Vitals Value Taken Time   BP 97/59    Temp 98    Pulse 84    Resp 14    SpO2 99        Electronically Signed By: HAI MARRUFO  April 7, 2023  9:59 AM

## 2023-04-11 LAB
PATH REPORT.COMMENTS IMP SPEC: NORMAL
PATH REPORT.FINAL DX SPEC: NORMAL
PATH REPORT.GROSS SPEC: NORMAL
PATH REPORT.MICROSCOPIC SPEC OTHER STN: NORMAL
PATH REPORT.RELEVANT HX SPEC: NORMAL
PHOTO IMAGE: NORMAL

## 2023-04-18 DIAGNOSIS — K50.012 CROHN'S DISEASE OF SMALL INTESTINE WITH INTESTINAL OBSTRUCTION (H): Primary | ICD-10-CM

## 2023-04-19 ENCOUNTER — MYC MEDICAL ADVICE (OUTPATIENT)
Dept: GASTROENTEROLOGY | Facility: CLINIC | Age: 38
End: 2023-04-19
Payer: COMMERCIAL

## 2023-04-19 ENCOUNTER — TELEPHONE (OUTPATIENT)
Dept: GASTROENTEROLOGY | Facility: CLINIC | Age: 38
End: 2023-04-19
Payer: COMMERCIAL

## 2023-04-19 NOTE — TELEPHONE ENCOUNTER
[facility] Call Center    Phone Message    May a detailed message be left on voicemail: yes     Reason for Call: Other: Patrick from [facility] [phone] called. Referral for radiology was sent in and is only good for 1 week, please extend it out longer as they are scheduling out more than a week out.     Action Taken: Other: [facility] / [facility]    Travel Screening: Not Applicable

## 2023-04-19 NOTE — TELEPHONE ENCOUNTER
Order scheduled out until 5/6/23. Provider wanted this patient to have this done ASAP.    Lyla Collins RN

## 2023-04-24 NOTE — TELEPHONE ENCOUNTER
Replied to "[facility] message - provided phone number for imaging scheduling.   Informed patient that there are no current lab orders from the provider, but will route this inquiry in case the provider wanted to order labs.    Bhavani Hung RN

## 2023-04-24 NOTE — TELEPHONE ENCOUNTER
Replied to [employer] message inquiring if 5/3 was the soonest that patient could get scheduled for MRE and that if facility had ability/capacity to do MRE, then it would be fine to have it done at that location.     Bhavani Hung RN

## 2023-04-25 DIAGNOSIS — K50.018: Primary | ICD-10-CM

## 2023-05-03 ENCOUNTER — TELEPHONE (OUTPATIENT)
Dept: GASTROENTEROLOGY | Facility: CLINIC | Age: 38
End: 2023-05-03

## 2023-05-03 ENCOUNTER — LAB (OUTPATIENT)
Dept: LAB | Facility: CLINIC | Age: 38
End: 2023-05-03
Attending: INTERNAL MEDICINE
Payer: COMMERCIAL

## 2023-05-03 ENCOUNTER — HOSPITAL ENCOUNTER (OUTPATIENT)
Dept: MRI IMAGING | Facility: CLINIC | Age: 38
Discharge: HOME | End: 2023-05-03
Attending: INTERNAL MEDICINE
Payer: COMMERCIAL

## 2023-05-03 DIAGNOSIS — K50.018: ICD-10-CM

## 2023-05-03 DIAGNOSIS — K50.012 CROHN'S DISEASE OF SMALL INTESTINE WITH INTESTINAL OBSTRUCTION (H): ICD-10-CM

## 2023-05-03 LAB
ALBUMIN SERPL-MCNC: 3.5 G/DL (ref 3.5–5)
ALP SERPL-CCNC: 84 U/L (ref 45–120)
ALT SERPL W P-5'-P-CCNC: 10 U/L (ref 0–45)
ANION GAP SERPL CALCULATED.3IONS-SCNC: 6 MMOL/L (ref 5–18)
AST SERPL W P-5'-P-CCNC: 13 U/L (ref 0–40)
BASOPHILS # BLD AUTO: 0.1 10E3/UL (ref 0–0.2)
BASOPHILS NFR BLD AUTO: 1 %
BILIRUB SERPL-MCNC: 0.2 MG/DL (ref 0–1)
BUN SERPL-MCNC: 9 MG/DL (ref 8–22)
C REACTIVE PROTEIN LHE: 1.3 MG/DL (ref 0–?)
CALCIUM SERPL-MCNC: 9.3 MG/DL (ref 8.5–10.5)
CHLORIDE BLD-SCNC: 107 MMOL/L (ref 98–107)
CO2 SERPL-SCNC: 30 MMOL/L (ref 22–31)
CREAT SERPL-MCNC: 0.74 MG/DL (ref 0.7–1.3)
EOSINOPHIL # BLD AUTO: 0.2 10E3/UL (ref 0–0.7)
EOSINOPHIL NFR BLD AUTO: 4 %
ERYTHROCYTE [DISTWIDTH] IN BLOOD BY AUTOMATED COUNT: 15.3 % (ref 10–15)
ERYTHROCYTE [SEDIMENTATION RATE] IN BLOOD BY WESTERGREN METHOD: 16 MM/HR (ref 0–15)
GFR SERPL CREATININE-BSD FRML MDRD: >90 ML/MIN/1.73M2
GLUCOSE BLD-MCNC: 38 MG/DL (ref 70–125)
HCT VFR BLD AUTO: 39.2 % (ref 40–53)
HGB BLD-MCNC: 11.7 G/DL (ref 13.3–17.7)
IMM GRANULOCYTES # BLD: 0 10E3/UL
IMM GRANULOCYTES NFR BLD: 1 %
LYMPHOCYTES # BLD AUTO: 1.3 10E3/UL (ref 0.8–5.3)
LYMPHOCYTES NFR BLD AUTO: 21 %
MCH RBC QN AUTO: 24 PG (ref 26.5–33)
MCHC RBC AUTO-ENTMCNC: 29.8 G/DL (ref 31.5–36.5)
MCV RBC AUTO: 81 FL (ref 78–100)
MONOCYTES # BLD AUTO: 0.5 10E3/UL (ref 0–1.3)
MONOCYTES NFR BLD AUTO: 8 %
NEUTROPHILS # BLD AUTO: 4.1 10E3/UL (ref 1.6–8.3)
NEUTROPHILS NFR BLD AUTO: 65 %
NRBC # BLD AUTO: 0 10E3/UL
NRBC BLD AUTO-RTO: 0 /100
PLATELET # BLD AUTO: 462 10E3/UL (ref 150–450)
POTASSIUM BLD-SCNC: 3 MMOL/L (ref 3.5–5)
PROT SERPL-MCNC: 6.6 G/DL (ref 6–8)
RBC # BLD AUTO: 4.87 10E6/UL (ref 4.4–5.9)
SODIUM SERPL-SCNC: 143 MMOL/L (ref 136–145)
WBC # BLD AUTO: 6.2 10E3/UL (ref 4–11)

## 2023-05-03 PROCEDURE — 74183 MRI ABD W/O CNTR FLWD CNTR: CPT

## 2023-05-03 PROCEDURE — 86140 C-REACTIVE PROTEIN: CPT

## 2023-05-03 PROCEDURE — 80053 COMPREHEN METABOLIC PANEL: CPT

## 2023-05-03 PROCEDURE — 86481 TB AG RESPONSE T-CELL SUSP: CPT

## 2023-05-03 PROCEDURE — 86706 HEP B SURFACE ANTIBODY: CPT

## 2023-05-03 PROCEDURE — 255N000002 HC RX 255 OP 636: Performed by: INTERNAL MEDICINE

## 2023-05-03 PROCEDURE — 87340 HEPATITIS B SURFACE AG IA: CPT

## 2023-05-03 PROCEDURE — 85025 COMPLETE CBC W/AUTO DIFF WBC: CPT

## 2023-05-03 PROCEDURE — 250N000011 HC RX IP 250 OP 636: Performed by: INTERNAL MEDICINE

## 2023-05-03 PROCEDURE — A9585 GADOBUTROL INJECTION: HCPCS | Performed by: INTERNAL MEDICINE

## 2023-05-03 PROCEDURE — 86704 HEP B CORE ANTIBODY TOTAL: CPT

## 2023-05-03 PROCEDURE — 36415 COLL VENOUS BLD VENIPUNCTURE: CPT

## 2023-05-03 PROCEDURE — 85652 RBC SED RATE AUTOMATED: CPT

## 2023-05-03 RX ORDER — GADOBUTROL 604.72 MG/ML
6 INJECTION INTRAVENOUS ONCE
Status: COMPLETED | OUTPATIENT
Start: 2023-05-03 | End: 2023-05-03

## 2023-05-03 RX ADMIN — GLUCAGON 0.5 MG: 1 INJECTION, POWDER, LYOPHILIZED, FOR SOLUTION INTRAMUSCULAR; INTRAVENOUS at 09:38

## 2023-05-03 RX ADMIN — GADOBUTROL 6 ML: 604.72 INJECTION INTRAVENOUS at 09:48

## 2023-05-03 RX ADMIN — GLUCAGON 0.5 MG: 1 INJECTION, POWDER, LYOPHILIZED, FOR SOLUTION INTRAMUSCULAR; INTRAVENOUS at 09:21

## 2023-05-03 NOTE — TELEPHONE ENCOUNTER
[facility] Call Center    Phone Message    May a detailed message be left on voicemail: yes     Reason for Call: Other:       Karlos is calling back returning a call to Bhavani.  He has some other follow up questions and would like a call back to discuss.         Action Taken: Message routed to:  [facility] Gastro    Travel Screening: Not Applicable

## 2023-05-03 NOTE — PROGRESS NOTES
Patient is not diabetic. Does not have insulinoma or pheochromocytoma. Glucagon 0.5 mg IV given x 2 doses per MR Enterography protocol.

## 2023-05-03 NOTE — TELEPHONE ENCOUNTER
Spoke with Mindy from [facility] lab.   DATE:  5/3/2023   TIME OF RECEIPT FROM LAB:  10:51AM  LAB TEST:  Glucose  LAB VALUE:  38  RESULTS GIVEN WITH READ-BACK TO DR. PALMA  TIME LAB VALUE REPORTED TO PROVIDER:   10:55 AM    Inquired (to Mindy from lab) if patient was still at lab - stated she believes he has already left clinic.   Called patient at 10:55 AM.  Left message for patient, suggesting that he have some juice, or something with increased sugar content. ER warnings given.     Bhavani Hung RN

## 2023-05-04 LAB
HBV CORE AB SERPL QL IA: NONREACTIVE
HBV SURFACE AB SERPL IA-ACNC: 223.04 M[IU]/ML
HBV SURFACE AB SERPL IA-ACNC: REACTIVE M[IU]/ML
HBV SURFACE AG SERPL QL IA: NONREACTIVE

## 2023-05-05 ENCOUNTER — TELEPHONE (OUTPATIENT)
Dept: GASTROENTEROLOGY | Facility: CLINIC | Age: 38
End: 2023-05-05
Payer: COMMERCIAL

## 2023-05-05 LAB
GAMMA INTERFERON BACKGROUND BLD IA-ACNC: 0.08 IU/ML
M TB IFN-G BLD-IMP: NEGATIVE
M TB IFN-G CD4+ BCKGRND COR BLD-ACNC: 6.91 IU/ML
MITOGEN IGNF BCKGRD COR BLD-ACNC: 0.02 IU/ML
MITOGEN IGNF BCKGRD COR BLD-ACNC: 0.03 IU/ML
QUANTIFERON MITOGEN: 6.99 IU/ML
QUANTIFERON NIL TUBE: 0.08 IU/ML
QUANTIFERON TB1 TUBE: 0.1 IU/ML
QUANTIFERON TB2 TUBE: 0.11

## 2023-05-05 ASSESSMENT — ENCOUNTER SYMPTOMS
HALLUCINATIONS: 0
BOWEL INCONTINENCE: 0
DIFFICULTY URINATING: 0
DYSURIA: 1
POOR WOUND HEALING: 0
DECREASED APPETITE: 0
FEVER: 0
SKIN CHANGES: 0
POLYDIPSIA: 0
BLOOD IN STOOL: 0
HEMATURIA: 0
JAUNDICE: 0
NAIL CHANGES: 0
SWOLLEN GLANDS: 0
ABDOMINAL PAIN: 1
CONSTIPATION: 0
FLANK PAIN: 0
FATIGUE: 1
VOMITING: 0
ALTERED TEMPERATURE REGULATION: 0
NAUSEA: 0
RECTAL PAIN: 0
INCREASED ENERGY: 0
CHILLS: 0
WEIGHT LOSS: 1
DIARRHEA: 0
POLYPHAGIA: 0
HEARTBURN: 0
WEIGHT GAIN: 0
BRUISES/BLEEDS EASILY: 0
NIGHT SWEATS: 0
BLOATING: 1

## 2023-05-05 NOTE — TELEPHONE ENCOUNTER
REFERRAL INFORMATION:    Referring Provider:  N/A    Referring Clinic:  ED dept.    Reason for Visit/Diagnosis: IBD     FUTURE VISIT INFORMATION:    Appointment Date: 05-10-23    Appointment Time: @ 10am      NOTES STATUS DETAILS   OFFICE NOTE from Referring Provider Internal 02-24-23Dr. Avery Franco    OFFICE NOTE from Other Specialist Internal 03-08-23 Dr. Karlos Moe [facility] DISCHARGE SUMMARY/  ED VISITS Internal 02-21-23 Dr. Lázaro Strange  02-21-23 Dr. Thea Roman  02-22-23, 03-16-23 Dr. Fabiola Miranda    02-23-23 Dr. Luiz Ley     OPERATIVE REPORT N/A    MEDICATION LIST Internal         COLONOSCOPY Internal 04-07-23   PERTINENT LABS Internal C-diff: 05-03-23, 03-30-23, 02-21-23, 02-16-23, 12-11-19   PATHOLOGY REPORTS (RELATED) Internal 04-27-23   IMAGING (CT, MRI, EGD, MRCP, Small Bowel Follow Through/SBT, MR/CT Enterography) Internal/Care everywhere MR enterograph: 05-03-23  CT abd: 03-07-23 ([facility]/PACS)

## 2023-05-05 NOTE — TELEPHONE ENCOUNTER
Pt called back. Pt is now scheduled for an appointment with Dr. Leo on 5/10/2023 at 10am for an in-person clinic visit.

## 2023-05-05 NOTE — TELEPHONE ENCOUNTER
Called and left voice message for Pt. Called Pt to help get them scheduled for an appointment with Dr. Leo. Writer left call back number.

## 2023-05-09 NOTE — TELEPHONE ENCOUNTER
Spoke with patient.   Writer apologized that she didn't receive message that he had returned call until today (message had been routed to [facility] of ).   Patient appreciative of voicemail left last week by writer re: glucose level- patient states that explains why he felt the way he was feeling.   Patient stated that he had fasted for labs that AM; after labs had been completed, he had gone to a drive-through for food - states that he didn't feel well, felt a little confused when ordering. After he was able to consume some food, felt better/recovered.   Writer inquired if he had ever felt like this before - patient stated that he has on occasion when he has been doing activities, like yardwork. Patient has not talked with PCP re: this - patient denied. Encourage patient to mention this to PCP.   Writer inquired if patient had any further questions - patient stated that he has appt with Dr. Leo tomorrow and will wait until then.   Patient appreciative of phone call.     Bhavani Hung RN

## 2023-05-10 ENCOUNTER — TELEPHONE (OUTPATIENT)
Dept: GASTROENTEROLOGY | Facility: CLINIC | Age: 38
End: 2023-05-10

## 2023-05-10 ENCOUNTER — LAB (OUTPATIENT)
Dept: LAB | Facility: CLINIC | Age: 38
End: 2023-05-10
Payer: COMMERCIAL

## 2023-05-10 ENCOUNTER — OFFICE VISIT (OUTPATIENT)
Dept: GASTROENTEROLOGY | Facility: CLINIC | Age: 38
End: 2023-05-10
Payer: COMMERCIAL

## 2023-05-10 ENCOUNTER — PRE VISIT (OUTPATIENT)
Dept: GASTROENTEROLOGY | Facility: CLINIC | Age: 38
End: 2023-05-10

## 2023-05-10 VITALS
BODY MASS INDEX: 19.33 KG/M2 | DIASTOLIC BLOOD PRESSURE: 90 MMHG | HEIGHT: 72 IN | SYSTOLIC BLOOD PRESSURE: 133 MMHG | OXYGEN SATURATION: 100 % | WEIGHT: 142.7 LBS | HEART RATE: 90 BPM

## 2023-05-10 DIAGNOSIS — K50.90 CROHN'S DISEASE (H): Primary | ICD-10-CM

## 2023-05-10 DIAGNOSIS — K50.018 CROHN'S DISEASE OF ILEUM WITH OTHER COMPLICATION (H): ICD-10-CM

## 2023-05-10 DIAGNOSIS — K50.90 CROHN'S DISEASE (H): ICD-10-CM

## 2023-05-10 DIAGNOSIS — K50.018 CROHN'S DISEASE OF ILEUM WITH OTHER COMPLICATION (H): Primary | ICD-10-CM

## 2023-05-10 LAB
ALBUMIN SERPL BCG-MCNC: 4 G/DL (ref 3.5–5.2)
ALP SERPL-CCNC: 91 U/L (ref 40–129)
ALT SERPL W P-5'-P-CCNC: 10 U/L (ref 10–50)
ANION GAP SERPL CALCULATED.3IONS-SCNC: 9 MMOL/L (ref 7–15)
AST SERPL W P-5'-P-CCNC: 14 U/L (ref 10–50)
BASOPHILS # BLD AUTO: 0.1 10E3/UL (ref 0–0.2)
BASOPHILS NFR BLD AUTO: 1 %
BILIRUB DIRECT SERPL-MCNC: <0.2 MG/DL (ref 0–0.3)
BILIRUB SERPL-MCNC: <0.2 MG/DL
BUN SERPL-MCNC: 16.1 MG/DL (ref 6–20)
CALCIUM SERPL-MCNC: 9.1 MG/DL (ref 8.6–10)
CHLORIDE SERPL-SCNC: 105 MMOL/L (ref 98–107)
CREAT SERPL-MCNC: 0.9 MG/DL (ref 0.67–1.17)
CRP SERPL-MCNC: 11.1 MG/L
DEPRECATED HCO3 PLAS-SCNC: 28 MMOL/L (ref 22–29)
EOSINOPHIL # BLD AUTO: 0.2 10E3/UL (ref 0–0.7)
EOSINOPHIL NFR BLD AUTO: 2 %
ERYTHROCYTE [DISTWIDTH] IN BLOOD BY AUTOMATED COUNT: 15.1 % (ref 10–15)
ERYTHROCYTE [SEDIMENTATION RATE] IN BLOOD BY WESTERGREN METHOD: 10 MM/HR (ref 0–15)
FERRITIN SERPL-MCNC: 7 NG/ML (ref 31–409)
FOLATE SERPL-MCNC: 9.6 NG/ML (ref 4.6–34.8)
GFR SERPL CREATININE-BSD FRML MDRD: >90 ML/MIN/1.73M2
GLUCOSE SERPL-MCNC: 93 MG/DL (ref 70–99)
HCT VFR BLD AUTO: 37.2 % (ref 40–53)
HGB BLD-MCNC: 11.3 G/DL (ref 13.3–17.7)
IMM GRANULOCYTES # BLD: 0 10E3/UL
IMM GRANULOCYTES NFR BLD: 0 %
LYMPHOCYTES # BLD AUTO: 2 10E3/UL (ref 0.8–5.3)
LYMPHOCYTES NFR BLD AUTO: 24 %
MCH RBC QN AUTO: 24.5 PG (ref 26.5–33)
MCHC RBC AUTO-ENTMCNC: 30.4 G/DL (ref 31.5–36.5)
MCV RBC AUTO: 81 FL (ref 78–100)
MONOCYTES # BLD AUTO: 0.7 10E3/UL (ref 0–1.3)
MONOCYTES NFR BLD AUTO: 8 %
NEUTROPHILS # BLD AUTO: 5.5 10E3/UL (ref 1.6–8.3)
NEUTROPHILS NFR BLD AUTO: 65 %
NRBC # BLD AUTO: 0 10E3/UL
NRBC BLD AUTO-RTO: 0 /100
PLATELET # BLD AUTO: 470 10E3/UL (ref 150–450)
POTASSIUM SERPL-SCNC: 3.8 MMOL/L (ref 3.4–5.3)
PROT SERPL-MCNC: 6.8 G/DL (ref 6.4–8.3)
RBC # BLD AUTO: 4.62 10E6/UL (ref 4.4–5.9)
SODIUM SERPL-SCNC: 142 MMOL/L (ref 136–145)
VIT B12 SERPL-MCNC: 401 PG/ML (ref 232–1245)
WBC # BLD AUTO: 8.4 10E3/UL (ref 4–11)

## 2023-05-10 PROCEDURE — 82306 VITAMIN D 25 HYDROXY: CPT | Performed by: PATHOLOGY

## 2023-05-10 PROCEDURE — 82248 BILIRUBIN DIRECT: CPT | Performed by: PATHOLOGY

## 2023-05-10 PROCEDURE — 85652 RBC SED RATE AUTOMATED: CPT | Performed by: PATHOLOGY

## 2023-05-10 PROCEDURE — 86364 TISS TRNSGLTMNASE EA IG CLAS: CPT | Performed by: PATHOLOGY

## 2023-05-10 PROCEDURE — 80053 COMPREHEN METABOLIC PANEL: CPT | Performed by: PATHOLOGY

## 2023-05-10 PROCEDURE — 85025 COMPLETE CBC W/AUTO DIFF WBC: CPT | Performed by: PATHOLOGY

## 2023-05-10 PROCEDURE — 82784 ASSAY IGA/IGD/IGG/IGM EACH: CPT | Performed by: PATHOLOGY

## 2023-05-10 PROCEDURE — 86140 C-REACTIVE PROTEIN: CPT | Performed by: PATHOLOGY

## 2023-05-10 PROCEDURE — 82746 ASSAY OF FOLIC ACID SERUM: CPT | Performed by: PATHOLOGY

## 2023-05-10 PROCEDURE — 82728 ASSAY OF FERRITIN: CPT | Performed by: PATHOLOGY

## 2023-05-10 PROCEDURE — 99000 SPECIMEN HANDLING OFFICE-LAB: CPT | Performed by: PATHOLOGY

## 2023-05-10 PROCEDURE — 99205 OFFICE O/P NEW HI 60 MIN: CPT | Mod: 24 | Performed by: INTERNAL MEDICINE

## 2023-05-10 PROCEDURE — 84433 ASY THIOPURIN S-MTHYLTRNSFRS: CPT | Mod: 90 | Performed by: PATHOLOGY

## 2023-05-10 PROCEDURE — 82607 VITAMIN B-12: CPT | Performed by: PATHOLOGY

## 2023-05-10 PROCEDURE — 36415 COLL VENOUS BLD VENIPUNCTURE: CPT | Performed by: PATHOLOGY

## 2023-05-10 RX ORDER — METHYLPREDNISOLONE SODIUM SUCCINATE 125 MG/2ML
125 INJECTION, POWDER, LYOPHILIZED, FOR SOLUTION INTRAMUSCULAR; INTRAVENOUS
Status: CANCELLED
Start: 2023-05-10

## 2023-05-10 RX ORDER — ALBUTEROL SULFATE 0.83 MG/ML
2.5 SOLUTION RESPIRATORY (INHALATION)
Status: CANCELLED | OUTPATIENT
Start: 2023-05-10

## 2023-05-10 RX ORDER — MEPERIDINE HYDROCHLORIDE 25 MG/ML
25 INJECTION INTRAMUSCULAR; INTRAVENOUS; SUBCUTANEOUS EVERY 30 MIN PRN
Status: CANCELLED | OUTPATIENT
Start: 2023-05-10

## 2023-05-10 RX ORDER — DIPHENHYDRAMINE HYDROCHLORIDE 50 MG/ML
50 INJECTION INTRAMUSCULAR; INTRAVENOUS
Status: CANCELLED
Start: 2023-05-10

## 2023-05-10 RX ORDER — ACETAMINOPHEN 325 MG/1
650 TABLET ORAL ONCE
Status: CANCELLED
Start: 2023-05-10 | End: 2023-05-10

## 2023-05-10 RX ORDER — ALBUTEROL SULFATE 90 UG/1
1-2 AEROSOL, METERED RESPIRATORY (INHALATION)
Status: CANCELLED
Start: 2023-05-10

## 2023-05-10 RX ORDER — FLUTICASONE PROPIONATE 50 MCG
1 SPRAY, SUSPENSION (ML) NASAL DAILY PRN
COMMUNITY
End: 2024-01-26

## 2023-05-10 RX ORDER — DIPHENHYDRAMINE HCL 25 MG
25 CAPSULE ORAL ONCE
Status: CANCELLED
Start: 2023-05-10 | End: 2023-05-10

## 2023-05-10 RX ORDER — EPINEPHRINE 1 MG/ML
0.3 INJECTION, SOLUTION, CONCENTRATE INTRAVENOUS EVERY 5 MIN PRN
Status: CANCELLED | OUTPATIENT
Start: 2023-05-10

## 2023-05-10 ASSESSMENT — PAIN SCALES - GENERAL: PAINLEVEL: NO PAIN (1)

## 2023-05-10 NOTE — TELEPHONE ENCOUNTER
----- Message from Lazaro Leo MD sent at 5/10/2023 10:44 AM CDT -----  Regarding: PA for infliximab and MTM referral  Needs PA for infliximab.  Please also arrange week 14 level  Needs to see MTM (order placed)    Jesus HO

## 2023-05-10 NOTE — PROGRESS NOTES
IBD CLINIC VISIT    CC/REFERRING MD:  No ref. provider found    REASON FOR CONSULTATION: establish care newly diagnosed Crohn's ileitis and colitis    ASSESSMENT/PLAN:    1. Moderate to severe Crohn's ileitis (with stricturing) and colitis (histologically only). Has evidence of multi-focal CD in small intestines with stricturing and both chronic/active changes. No obstructive symptoms. Biopsies support Crohn's Disease. Discussed natural history of Crohn's Disease. Discussed how CD is usually life long. Ideally treat now to help decrease risk for surgery in future and prevent further active inflammation and worsening stricturing that could lead to need of surgery in the future. Discussed that surgery may still eventually be needed but hopefully with treatment the risk of this can be decreased and if needs surgery in future may decrease amount that may need to be resected. Discussed medications - anti-TNF (infliximab and adalimumab) and anti-interleukin (ustekinumab and risankizumab). Risks and benefits discussed including infection and malignancy (skin cancer, lymphoma, other cancer) and other. I think anti-TNF is best option for him given extent of involvement. He agrees to this    -seek PA for infliximab and start  -MTM referral  -week 14 IFX level and adjust as needed (maximize dosing instead of combination therapy)  -check nutritional markers today    2. Hypokalemia - recheck and supplement if needed    3. Anemia - likely related to CD. Treat as above. Check Fe/B12 and folate      IBD HISTORY  Age at diagnosis: 37 (2023)  Extent of disease: mainly small intestine but histologic colon as well on random biopsies  Disease phenotype: inflammatory/stricturing  Lakshmi-anal disease: none  Current CD medications: none  Prior IBD surgeries: none   Prior IBD Medications: none    DRUG MONITORING  TPMT enzyme activity: pending    6-TGN/6-MMPN levels: NA    Biologic concentration: NA    DISEASE ASSESSMENT  Labs  Recent Labs    Lab Test 05/03/23  1012 02/16/23  1147   CRP 1.3*  --    SED 16* 26*     Fecal calprotectin: not checked  Endoscopy:   -colonoscopy 4/2022 - strictured ileum 3 cm prox to IC valve - biopsies show active ileitis with granulomas. Mainly chronic changes seen at IC valve/distal ileum on colon. Colon appeared normal but had patchy colitis with granulomas  Enterography:   -MRE 5/3/23:  ENTEROGRAPHY: No penetrating disease. The colon and stomach are unremarkable.     1. An approximately 20 cm segment of small bowel at the terminal ileum with moderate wall thickening, luminal narrowing and stratified mural hyperenhancement is consistent with active inflammation, images 5-15, series 3. No stricturing.     2. A 7.3 cm segment in the small bowel in the midabdomen with mild wall thickening, luminal narrowing and stratified mural hyperenhancement is consistent with active inflammation, image 7:3. Stricturing present with the proximal small bowel measuring 4.9   cm, image 6:3.     3. A 6.8 cm segment in the small bowel in the mid right abdomen with mild wall thickening, luminal narrowing and stratified mural hyperenhancement is consistent with active inflammation, image 9:3. Stricturing present with the proximal small bowel   measuring 3.7 cm, image 6:3.  C diff: not checked (no diarrhea)    sIBDQ:   IBDQ Score Date IBDQ - Total Score  (Higher score better)   5/5/2023  11:59 AM 49       IBD Health Care Maintenance:    Vaccinations:  All patients on biologics should avoid live vaccines.    -- Influenza (every year)  -- TdaP (every 10 years)  -- Pneumococcal Pneumonia (once plus booster at 5 years)  -- Yearly assessment for latent Tb (verbal screening and exam, PPD or QuantiFERON-Tb testing)    One time confirmation of immunity or serologies:  -- Hepatitis A (serologies or immunizations)  -- Hepatitis B (serologies or immunizations)  -- Varicella  -- MMR  -- HPV (all aged 18-26)  -- Meningococcal meningitis (all patients at risk  for meningitis)  -- Due to the immunosuppression in this patient, I would not advise administration of live vaccines such as varicella/VZV, intranasal influenza, MMR, or yellow fever vaccine (if travelling).      Bone mineral density screening   -- Recommend all patients supplement with calcium and vitamin D  -- Given no prior steroid use recommend DEXA not needed at this time    Cancer Screening:  Colon cancer screening:  Given colonic involvement, recommend patient undergo regular dysplasia surveillance   Next dysplasia screening is recommended 2031.    Skin cancer screening: Annual visual exam of skin by dermatologist since patient is immunocompromised    Depression Screening:  -- Over the last month, have you felt down, depressed, or hopeless? no  -- Over the last month, have you felt little interest or pleasure doing things? no    Misc:  -- Avoid tobacco use  -- Avoid NSAIDs as there is potentially a 25% chance of causing an IBD flare    Return to clinic in 3 months IBD PA and 6 months with Dr. Leo    Thank you for this consultation.  It was a pleasure to participate in the care of this patient; please contact us with any further questions.  I spent a total of 60 minutes during the day of encounter performed chart review, meeting with patient, patient counseling, care coordination, and documentation.      This note was created with voice recognition software, and while reviewed for accuracy, typos may remain.     Lazaro Leo MD  Division of Gastroenterology, Hepatology and Nutrition  [facility]  Pager: [phone]      HPI:   Currently, Karlos is here today to establish care in IBD clinic for his new diagnosis of Crohn's disease.    Karlos notes that he has had intermittent GI symptoms for several years.  However, he noticed things worsened more substantially in November 2022.  At that time he noticed increased generalized abdominal discomfort and irregular bowel habits.  He noticed during this time  period that his abdomen was quite tender to the touch.  If his children were to bump into his abdomen he would have substantial discomfort.  He eventually saw his primary care doctor in January of this year.  His primary care doctor did order a CT scan.  The initial read of the CT scan was concerning for appendicitis.  Interestingly, by the time the CT scan was done he reports that his abdominal discomfort had significantly improved.  However because of the ominous findings on the CT scan he did go to the emergency room.  He was seen by general surgery and taken to the operating room.  However, once they entered the abdomen they found there was significant evidence of inflammation and bleeding.  The majority of inflammation was noted in the small bowel and the cecum.  The appendix was hard to access and nonmobile with lots of adhesive tissue.  However, it was noted itself to be healthy and not perforated.  Hemostasis was achieved and no resection was performed.  There was concern for Crohn's disease and the patient was referred for colonoscopy.      Dr. Seaman performed a colonoscopy on 4/7/2023.  The IC valve was open but the scope could not be advanced beyond 3 cm into the terminal ileum due to stricturing.  No apathy or ulcerations were seen.  Biopsies were taken of the visualized terminal ileum.  The colonic mucosa was essentially unremarkable though rare punctate red lesions with whitish center were noticed in the left colon.  Biopsies were done.  The area of the appendix was edematous.  Biopsies of both the ileum and the colon showed active changes with granulomas.  This was felt to be consistent with Crohn's disease in the correct clinical setting.      An MR enterography was performed and showed multiple areas of small bowel with moderate wall thickening and luminal narrowing.  Changes were also consistent with active inflammation.  There was a 20 cm segment in the terminal ileum of involvement with no  stricturing.  There was another 7.3 cm segment in the mid abdominal region with some stricturing.  There was another 6.8 cm segment in the right mid abdomen with some stricturing present.    Laboratory studies have been obtained which do show evidence of inflammation with CRP of 11.  Mild anemia at 11.3.  Mild thrombocytosis at 470.  Normal white blood cell count.  Vitamin B12 and folate are normal.  Ferritin low at 7.    He is here today to establish care and discuss treatment options for his newly diagnosed Crohn's disease.  In general his symptoms are stable now.  He has minimal abdominal discomfort.  He has chronic right lower quadrant discomfort that is mild.  He has no nausea or vomiting.  He has no obstructive symptoms.  Bowel movements are currently regular with 1 to 2/day.  No blood in his stools.  He denies any significant rashes.  He denies any eye pain or redness.  He has had mouth sores in the past but none current.  He notes no specific joint pains currently although can have some pain in his fingers.  He has a history of rockclimbing so he is not certain whether it is related to that or not.    He denies any family history of Crohn's disease or ulcerative colitis.  No history of colon cancer or colon polyps.      ROS:    No fevers or chills  No weight loss  No blurry vision, double vision or change in vision  No sore throat  No lymphadenopathy  No headache, paraesthesias, or weakness in a limb  No shortness of breath or wheezing  No chest pain or pressure  No arthralgias or myalgias  No rashes or skin changes  No odynophagia or dysphagia  No BRBPR, hematochezia, melena  No dysuria, frequency or urgency  No hot/cold intolerance or polyria  No anxiety or depression    Extra intestinal manifestations of IBD:  No uveitis/episcleritis  No aphthous ulcers   No arthritis   No erythema nodosum/pyoderma gangrenosum.     PERTINENT PAST MEDICAL HISTORY:  No past medical history on file.    PREVIOUS  SURGERIES:  Past Surgical History:   Procedure Laterality Date     COLONOSCOPY N/A 4/7/2023    Procedure: COLONOSCOPY, WITH BIOPSY;  Surgeon: Kilo Seaman MD;  Location: [facility] OR     LAPAROSCOPIC APPENDECTOMY N/A 2/22/2023    Procedure: DIAGNOSTIC LAPAROSCOPY, LYSIS OF ADHESION, DRAIN PLACEMENT;  Surgeon: Fabiola Miranda MD;  Location: [facility] OR       PREVIOUS ENDOSCOPY:  Results for orders placed or performed during the hospital encounter of 04/07/23   COLONOSCOPY   Result Value Ref Range    COLONOSCOPY       [facility]  [address] [place], [place] [address]., [place] [address] [phone]     Endoscopy Department  _______________________________________________________________________________  Patient Name: Nicholas Terrazas            Procedure Date: 4/7/2023 6:37 AM  MRN: [id number]                       Account Number: [id number]  YOB: 1985              Admit Type: Outpatient  Age: 37                               Room: [facility] PROCEDURE ROOM 02  Gender: Male                          Note Status: Finalized  Attending MD: KILO SEAMAN MD   Total Sedation Time:   _______________________________________________________________________________     Procedure:             Colonoscopy  Indications:           Suspected Crohn's disease of the small bowel and colon  Providers:             KILO SEAMAN MD, Sera Benítez, RN, HAI MARRUFO  Patient Profile:       This is a 37 year old male with recent symptoms of RLQ                          pain and we ight loss over a period of > 1 month. He                          was diagnosed with appendicitis and had surgery;                          however, finding of inflammation in the right colon                          and terminal ileum led to aborted surgery and a                          request for a colonoscopy.  Referring MD:          NICHOLAS ISAAC MD  Medicines:             Monitored Anesthesia Care  Complications:          No immediate complications.  _______________________________________________________________________________  Procedure:             Pre-Anesthesia Assessment:                         - Airway Examination: normal oropharyngeal airway and                          neck mobility.                         - Respiratory Examination: clear to auscultation.                         - CV Examination: regular rate and rhythm.                         - ASA Grade Assessment: I - A normal, healthy patient.                         - After reviewing the risks and benefits, the  patient                          was deemed in satisfactory condition to undergo the                          procedure.                         - The anesthesia plan was to use deep                          sedation/analgesia.                         - Immediately prior to administration of medications,                          the patient was re-assessed for adequacy to receive                          sedatives.                         - Sedation was administered by an anesthesia                          professional. Deep sedation was attained.                         - The heart rate, respiratory rate, oxygen                          saturations, blood pressure, adequacy of pulmonary                          ventilation, and response to care were monitored                          throughout the procedure.                         - The physical status of the patient was re-assessed                          after the procedure.                         After obtaining informed cons ent, the colonoscope was                          passed under direct vision. Throughout the procedure,                          the patient's blood pressure, pulse, and oxygen                          saturations were monitored continuously. The                          Colonoscope was introduced through the anus and                          advanced to the terminal ileum.  The colonoscopy was                          performed without difficulty. The patient tolerated                          the procedure well. The quality of the bowel                          preparation was evaluated using the BBPS ([place] Bowel                          Preparation Scale) with scores of: Right Colon = 3,                          Transverse Colon = 3 and Left Colon = 3 (entire mucosa                          seen well with no residual staining, small fragments                          of stool or opaque liquid). The total BBPS score                          equals 9. The terminal ileum, ileocecal va lve,                          appendiceal orifice, and rectum were photographed.                                                                                   Findings:       No aphthae or ulcerations. The IC valve is open; however, advancing the        scope beyond ~ 3 cm into the terminal ileum was not possible due to        either stricture or extrinsic compression. Biopsies were taken in the        accessible terminal ileum.       Essentially unremarkable mucosa, although rare punctate red lesions with        whitish center were noted in the left colon. Biopsies of the colon        mucosa were done.       The area of the appendix was edematous.                                                                                   Impression:            - The findings are not diagnostic of Crohn's disease                          and could be consistent with recent appendicitis.                          However, examination of the terminal ileum was limited.  Recommendation:        -  Await pathology results.                         - Consider repeat abdominal imaging.                                                                                     Signed Electronically by Lamont Palma MD  _____________________  LAMONT PALMA MD  4/7/2023 10:06:36 AM  I was physically present for the  entire viewing portion of the exam.  __________________________  Signature of teaching physician  KILO PALMA MD  Number of Addenda: 0    Note Initiated On: 4/7/2023 6:37 AM  Scope In:  Scope Out:     ]    ALLERGIES:   No Known Allergies    PERTINENT MEDICATIONS:    Current Outpatient Medications:      fluticasone (FLONASE ALLERGY RELIEF) 50 MCG/ACT nasal spray, Spray 1 spray into both nostrils daily, Disp: , Rfl:      acetaminophen (TYLENOL) 500 MG tablet, Take 500-1,000 mg by mouth every 6 hours as needed for mild pain, Disp: , Rfl:      bisacodyl (DULCOLAX) 5 MG EC tablet, Take 2 tablets at 3 pm the day before your procedure. If your procedure is before 11 am, take 2 additional tablets at 11 pm. If your procedure is after 11 am, take 2 additional tablets at 6 am. For additional instructions refer to your colonoscopy prep instructions., Disp: 4 tablet, Rfl: 0     gabapentin (NEURONTIN) 100 MG capsule, Take 2 capsules (200 mg) by mouth 3 times daily for 3 days, Disp: 10 capsule, Rfl: 0     polyethylene glycol (GOLYTELY) 236 g suspension, The night before the exam at 6 pm drink an 8-ounce glass every 15 minutes until the jug is half empty. If you arrive before 11 AM: Drink the other half of the Golytely jug at 11 PM night before procedure. If you arrive after 11 AM: Drink the other half of the Golytely jug at 6 AM day of procedure. For additional instructions refer to your colonoscopy prep instructions., Disp: 4000 mL, Rfl: 0    SOCIAL HISTORY:  Social History     Socioeconomic History     Marital status:      Spouse name: Not on file     Number of children: Not on file     Years of education: Not on file     Highest education level: Not on file   Occupational History     Not on file   Tobacco Use     Smoking status: Never     Smokeless tobacco: Never   Vaping Use     Vaping status: Not on file   Substance and Sexual Activity     Alcohol use: Yes     Comment: occasional use     Drug use: Yes      Types: Marijuana     Sexual activity: Yes     Partners: Female   Other Topics Concern     Parent/sibling w/ CABG, MI or angioplasty before 65F 55M? No   Social History Narrative     Not on file     Social Determinants of Health     Financial Resource Strain: Not on file   Food Insecurity: Not on file   Transportation Needs: Not on file   Physical Activity: Not on file   Stress: Not on file   Social Connections: Not on file   Intimate Partner Violence: Not on file   Housing Stability: Not on file       FAMILY HISTORY:  No family history on file.    Past/family/social history reviewed and no changes    PHYSICAL EXAMINATION:  Constitutional: aaox3, cooperative, pleasant, not dyspneic/diaphoretic, no acute distress  Vitals reviewed: BP (!) 133/90   Pulse 90   Ht 1.829 m (6')   Wt 64.7 kg (142 lb 11.2 oz)   SpO2 100%   BMI 19.35 kg/m    Wt:   Wt Readings from Last 2 Encounters:   05/10/23 64.7 kg (142 lb 11.2 oz)   04/07/23 61.2 kg (135 lb)      Eyes: Sclera anicteric/injected  Ears/nose/mouth/throat: Normal oropharynx without ulcers or exudate, mucus membranes moist, hearing intact  Neck: supple, thyroid normal size  CV: No edema  Respiratory: Unlabored breathing  Lymph: No axillary, submandibular, supraclavicular or inguinal lymphadenopathy  Abd:  Nondistended, +bs, no hepatosplenomegaly, nontender, no peritoneal signs  Skin: warm, perfused, no jaundice  Psych: Normal affect  MSK: Normal gait      PERTINENT STUDIES:  Most recent CBC:  Recent Labs   Lab Test 05/03/23  1012 03/30/23  1005   WBC 6.2 8.1   HGB 11.7* 11.6*   HCT 39.2* 39.7*   * 571*     Most recent hepatic panel:  Recent Labs   Lab Test 05/03/23  1012 03/30/23  1005   ALT 10 14   AST 13 16     Most recent creatinine:  Recent Labs   Lab Test 05/03/23  1012 03/30/23  1005   CR 0.74 0.91             Answers for HPI/ROS submitted by the patient on 5/5/2023  General Symptoms: Yes  Skin Symptoms: Yes  HENT Symptoms: No  EYE SYMPTOMS: No  HEART  SYMPTOMS: No  LUNG SYMPTOMS: No  INTESTINAL SYMPTOMS: Yes  URINARY SYMPTOMS: Yes  REPRODUCTIVE SYMPTOMS: No  SKELETAL SYMPTOMS: No  BLOOD SYMPTOMS: Yes  NERVOUS SYSTEM SYMPTOMS: No  MENTAL HEALTH SYMPTOMS: No  Fever: No  Loss of appetite: No  Weight loss: Yes  Weight gain: No  Fatigue: Yes  Night sweats: No  Chills: No  Increased stress: No  Excessive hunger: No  Excessive thirst: No  Feeling hot or cold when others believe the temperature is normal: No  Loss of height: No  Post-operative complications: No  Surgical site pain: No  Hallucinations: No  Change in or Loss of Energy: No  Hyperactivity: No  Confusion: No  Changes in hair: No  Changes in moles/birth marks: No  Itching: Yes  Rashes: No  Changes in nails: No  Acne: No  Change in facial hair: No  Warts: No  Non-healing sores: No  Scarring: No  Flaking of skin: No  Color changes of hands/feet in cold : No  Sun sensitivity: No  Skin thickening: No  Heart burn or indigestion: No  Nausea: No  Vomiting: No  Abdominal pain: Yes  Bloating: Yes  Constipation: No  Diarrhea: No  Blood in stool: No  Black stools: No  Rectal or Anal pain: No  Fecal incontinence: No  Yellowing of skin or eyes: No  Vomit with blood: No  Change in stools: No  Trouble holding urine or incontinence: No  Pain or burning: Yes  Trouble starting or stopping: No  Increased frequency of urination: No  Blood in urine: No  Decreased frequency of urination: No  Frequent nighttime urination: No  Flank pain: No  Difficulty emptying bladder: No  Anemia: Yes  Swollen glands: No  Easy bleeding or bruising: No  Edema or swelling: No

## 2023-05-10 NOTE — PATIENT INSTRUCTIONS
It is good to see you today. I have outlined my recommendations below.     I recommend we start treatment for Crohn's Disease. Infliximab (Remicade) is what I recommend for you. We will ask your insurance for approval of this.    I will refer you to the pharmacy team to discuss this medication. They will call you to schedule this.    Check additional blood tests today    Follow up in 3 months with the IBD PA    Follow up in 6 months with Dr. Leo

## 2023-05-10 NOTE — NURSING NOTE
Chief Complaint   Patient presents with     New Patient       Vitals:    05/10/23 0950   BP: (!) 133/90   Pulse: 90   SpO2: 100%   Weight: 64.7 kg (142 lb 11.2 oz)   Height: 1.829 m (6')       Body mass index is 19.35 kg/m .    Liya Graham

## 2023-05-10 NOTE — LETTER
5/10/2023         RE: Karlos Terrazas  [address] [facility] [address]        Dear Colleague,    Thank you for referring your patient, Karlos Terrazas, to the [facility] [place]. Please see a copy of my visit note below.    IBD CLINIC VISIT    CC/REFERRING MD:  No ref. provider found    REASON FOR CONSULTATION: establish care newly diagnosed Crohn's ileitis and colitis    ASSESSMENT/PLAN:    1. Moderate to severe Crohn's ileitis (with stricturing) and colitis (histologically only). Has evidence of multi-focal CD in small intestines with stricturing and both chronic/active changes. No obstructive symptoms. Biopsies support Crohn's Disease. Discussed natural history of Crohn's Disease. Discussed how CD is usually life long. Ideally treat now to help decrease risk for surgery in future and prevent further active inflammation and worsening stricturing that could lead to need of surgery in the future. Discussed that surgery may still eventually be needed but hopefully with treatment the risk of this can be decreased and if needs surgery in future may decrease amount that may need to be resected. Discussed medications - anti-TNF (infliximab and adalimumab) and anti-interleukin (ustekinumab and risankizumab). Risks and benefits discussed including infection and malignancy (skin cancer, lymphoma, other cancer) and other. I think anti-TNF is best option for him given extent of involvement. He agrees to this    -seek PA for infliximab and start  -MTM referral  -week 14 IFX level and adjust as needed (maximize dosing instead of combination therapy)  -check nutritional markers today    2. Hypokalemia - recheck and supplement if needed    3. Anemia - likely related to CD. Treat as above. Check Fe/B12 and folate      IBD HISTORY  Age at diagnosis: 37 (2023)  Extent of disease: mainly small intestine but histologic colon as well on random biopsies  Disease phenotype:  inflammatory/stricturing  Lakshmi-anal disease: none  Current CD medications: none  Prior IBD surgeries: none   Prior IBD Medications: none    DRUG MONITORING  TPMT enzyme activity: pending    6-TGN/6-MMPN levels: NA    Biologic concentration: NA    DISEASE ASSESSMENT  Labs  Recent Labs   Lab Test 05/03/23  1012 02/16/23  1147   CRP 1.3*  --    SED 16* 26*     Fecal calprotectin: not checked  Endoscopy:   -colonoscopy 4/2022 - strictured ileum 3 cm prox to IC valve - biopsies show active ileitis with granulomas. Mainly chronic changes seen at IC valve/distal ileum on colon. Colon appeared normal but had patchy colitis with granulomas  Enterography:   -MRE 5/3/23:  ENTEROGRAPHY: No penetrating disease. The colon and stomach are unremarkable.     1. An approximately 20 cm segment of small bowel at the terminal ileum with moderate wall thickening, luminal narrowing and stratified mural hyperenhancement is consistent with active inflammation, images 5-15, series 3. No stricturing.     2. A 7.3 cm segment in the small bowel in the midabdomen with mild wall thickening, luminal narrowing and stratified mural hyperenhancement is consistent with active inflammation, image 7:3. Stricturing present with the proximal small bowel measuring 4.9   cm, image 6:3.     3. A 6.8 cm segment in the small bowel in the mid right abdomen with mild wall thickening, luminal narrowing and stratified mural hyperenhancement is consistent with active inflammation, image 9:3. Stricturing present with the proximal small bowel   measuring 3.7 cm, image 6:3.  C diff: not checked (no diarrhea)    sIBDQ:   IBDQ Score Date IBDQ - Total Score  (Higher score better)   5/5/2023  11:59 AM 49       IBD Health Care Maintenance:    Vaccinations:  All patients on biologics should avoid live vaccines.    -- Influenza (every year)  -- TdaP (every 10 years)  -- Pneumococcal Pneumonia (once plus booster at 5 years)  -- Yearly assessment for latent Tb (verbal  screening and exam, PPD or QuantiFERON-Tb testing)    One time confirmation of immunity or serologies:  -- Hepatitis A (serologies or immunizations)  -- Hepatitis B (serologies or immunizations)  -- Varicella  -- MMR  -- HPV (all aged 18-26)  -- Meningococcal meningitis (all patients at risk for meningitis)  -- Due to the immunosuppression in this patient, I would not advise administration of live vaccines such as varicella/VZV, intranasal influenza, MMR, or yellow fever vaccine (if travelling).      Bone mineral density screening   -- Recommend all patients supplement with calcium and vitamin D  -- Given no prior steroid use recommend DEXA not needed at this time    Cancer Screening:  Colon cancer screening:  Given colonic involvement, recommend patient undergo regular dysplasia surveillance   Next dysplasia screening is recommended 2031.    Skin cancer screening: Annual visual exam of skin by dermatologist since patient is immunocompromised    Depression Screening:  -- Over the last month, have you felt down, depressed, or hopeless? no  -- Over the last month, have you felt little interest or pleasure doing things? no    Misc:  -- Avoid tobacco use  -- Avoid NSAIDs as there is potentially a 25% chance of causing an IBD flare    Return to clinic in 3 months IBD PA and 6 months with Dr. Leo    Thank you for this consultation.  It was a pleasure to participate in the care of this patient; please contact us with any further questions.  I spent a total of 60 minutes during the day of encounter performed chart review, meeting with patient, patient counseling, care coordination, and documentation.      This note was created with voice recognition software, and while reviewed for accuracy, typos may remain.     Lazaro Leo MD  Division of Gastroenterology, Hepatology and Nutrition  [facility]  Pager: [phone]      HPI:   Currently, Karlos is here today to establish care in IBD clinic for his new diagnosis of  Crohn's disease.    Karlos notes that he has had intermittent GI symptoms for several years.  However, he noticed things worsened more substantially in November 2022.  At that time he noticed increased generalized abdominal discomfort and irregular bowel habits.  He noticed during this time period that his abdomen was quite tender to the touch.  If his children were to bump into his abdomen he would have substantial discomfort.  He eventually saw his primary care doctor in January of this year.  His primary care doctor did order a CT scan.  The initial read of the CT scan was concerning for appendicitis.  Interestingly, by the time the CT scan was done he reports that his abdominal discomfort had significantly improved.  However because of the ominous findings on the CT scan he did go to the emergency room.  He was seen by general surgery and taken to the operating room.  However, once they entered the abdomen they found there was significant evidence of inflammation and bleeding.  The majority of inflammation was noted in the small bowel and the cecum.  The appendix was hard to access and nonmobile with lots of adhesive tissue.  However, it was noted itself to be healthy and not perforated.  Hemostasis was achieved and no resection was performed.  There was concern for Crohn's disease and the patient was referred for colonoscopy.      Dr. Seaman performed a colonoscopy on 4/7/2023.  The IC valve was open but the scope could not be advanced beyond 3 cm into the terminal ileum due to stricturing.  No apathy or ulcerations were seen.  Biopsies were taken of the visualized terminal ileum.  The colonic mucosa was essentially unremarkable though rare punctate red lesions with whitish center were noticed in the left colon.  Biopsies were done.  The area of the appendix was edematous.  Biopsies of both the ileum and the colon showed active changes with granulomas.  This was felt to be consistent with Crohn's disease in the  correct clinical setting.      An MR enterography was performed and showed multiple areas of small bowel with moderate wall thickening and luminal narrowing.  Changes were also consistent with active inflammation.  There was a 20 cm segment in the terminal ileum of involvement with no stricturing.  There was another 7.3 cm segment in the mid abdominal region with some stricturing.  There was another 6.8 cm segment in the right mid abdomen with some stricturing present.    Laboratory studies have been obtained which do show evidence of inflammation with CRP of 11.  Mild anemia at 11.3.  Mild thrombocytosis at 470.  Normal white blood cell count.  Vitamin B12 and folate are normal.  Ferritin low at 7.    He is here today to establish care and discuss treatment options for his newly diagnosed Crohn's disease.  In general his symptoms are stable now.  He has minimal abdominal discomfort.  He has chronic right lower quadrant discomfort that is mild.  He has no nausea or vomiting.  He has no obstructive symptoms.  Bowel movements are currently regular with 1 to 2/day.  No blood in his stools.  He denies any significant rashes.  He denies any eye pain or redness.  He has had mouth sores in the past but none current.  He notes no specific joint pains currently although can have some pain in his fingers.  He has a history of rockclimbing so he is not certain whether it is related to that or not.    He denies any family history of Crohn's disease or ulcerative colitis.  No history of colon cancer or colon polyps.      ROS:    No fevers or chills  No weight loss  No blurry vision, double vision or change in vision  No sore throat  No lymphadenopathy  No headache, paraesthesias, or weakness in a limb  No shortness of breath or wheezing  No chest pain or pressure  No arthralgias or myalgias  No rashes or skin changes  No odynophagia or dysphagia  No BRBPR, hematochezia, melena  No dysuria, frequency or urgency  No hot/cold  intolerance or polyria  No anxiety or depression    Extra intestinal manifestations of IBD:  No uveitis/episcleritis  No aphthous ulcers   No arthritis   No erythema nodosum/pyoderma gangrenosum.     PERTINENT PAST MEDICAL HISTORY:  No past medical history on file.    PREVIOUS SURGERIES:  Past Surgical History:   Procedure Laterality Date    COLONOSCOPY N/A 4/7/2023    Procedure: COLONOSCOPY, WITH BIOPSY;  Surgeon: Kilo Seaman MD;  Location: [facility] OR    LAPAROSCOPIC APPENDECTOMY N/A 2/22/2023    Procedure: DIAGNOSTIC LAPAROSCOPY, LYSIS OF ADHESION, DRAIN PLACEMENT;  Surgeon: Fabiola Miranda MD;  Location: [facility] OR       PREVIOUS ENDOSCOPY:  Results for orders placed or performed during the hospital encounter of 04/07/23   COLONOSCOPY   Result Value Ref Range    COLONOSCOPY       [facility]  [address] [place] [address], [place] [address] [phone]     Endoscopy Department  _______________________________________________________________________________  Patient Name: Karlos Terrazas            Procedure Date: 4/7/2023 6:37 AM  MRN: [id number]                       Account Number: [id number]  YOB: 1985              Admit Type: Outpatient  Age: 37                               [facility]: [facility] 02  Gender: Male                          Note Status: Finalized  Attending MD: KILO SEAMAN MD   Total Sedation Time:   _______________________________________________________________________________     Procedure:             Colonoscopy  Indications:           Suspected Crohn's disease of the small bowel and colon  Providers:             KILO SEAMAN MD, Sera Benítez RN, HAI MARRUFO  Patient Profile:       This is a 37 year old male with recent symptoms of RLQ                          pain and we ight loss over a period of > 1 month. He                          was diagnosed with appendicitis and had surgery;                          however,  finding of inflammation in the right colon                          and terminal ileum led to aborted surgery and a                          request for a colonoscopy.  Referring MD:          NICHOLAS ISAAC MD  Medicines:             Monitored Anesthesia Care  Complications:         No immediate complications.  _______________________________________________________________________________  Procedure:             Pre-Anesthesia Assessment:                         - Airway Examination: normal oropharyngeal airway and                          neck mobility.                         - Respiratory Examination: clear to auscultation.                         - CV Examination: regular rate and rhythm.                         - ASA Grade Assessment: I - A normal, healthy patient.                         - After reviewing the risks and benefits, the  patient                          was deemed in satisfactory condition to undergo the                          procedure.                         - The anesthesia plan was to use deep                          sedation/analgesia.                         - Immediately prior to administration of medications,                          the patient was re-assessed for adequacy to receive                          sedatives.                         - Sedation was administered by an anesthesia                          professional. Deep sedation was attained.                         - The heart rate, respiratory rate, oxygen                          saturations, blood pressure, adequacy of pulmonary                          ventilation, and response to care were monitored                          throughout the procedure.                         - The physical status of the patient was re-assessed                          after the procedure.                         After obtaining informed cons ent, the colonoscope was                          passed under direct vision. Throughout the  procedure,                          the patient's blood pressure, pulse, and oxygen                          saturations were monitored continuously. The                          Colonoscope was introduced through the anus and                          advanced to the terminal ileum. The colonoscopy was                          performed without difficulty. The patient tolerated                          the procedure well. The quality of the bowel                          preparation was evaluated using the BBPS ([place] Bowel                          Preparation Scale) with scores of: Right Colon = 3,                          Transverse Colon = 3 and Left Colon = 3 (entire mucosa                          seen well with no residual staining, small fragments                          of stool or opaque liquid). The total BBPS score                          equals 9. The terminal ileum, ileocecal va lve,                          appendiceal orifice, and rectum were photographed.                                                                                   Findings:       No aphthae or ulcerations. The IC valve is open; however, advancing the        scope beyond ~ 3 cm into the terminal ileum was not possible due to        either stricture or extrinsic compression. Biopsies were taken in the        accessible terminal ileum.       Essentially unremarkable mucosa, although rare punctate red lesions with        whitish center were noted in the left colon. Biopsies of the colon        mucosa were done.       The area of the appendix was edematous.                                                                                   Impression:            - The findings are not diagnostic of Crohn's disease                          and could be consistent with recent appendicitis.                          However, examination of the terminal ileum was limited.  Recommendation:        -  Await pathology results.                          - Consider repeat abdominal imaging.                                                                                     Signed Electronically by Lamont Palma MD  _____________________  LAMONT PALMA MD  4/7/2023 10:06:36 AM  I was physically present for the entire viewing portion of the exam.  __________________________  Signature of teaching physician  LAMONT PALMA MD  Number of Addenda: 0    Note Initiated On: 4/7/2023 6:37 AM  Scope In:  Scope Out:     ]    ALLERGIES:   No Known Allergies    PERTINENT MEDICATIONS:    Current Outpatient Medications:     fluticasone (FLONASE ALLERGY RELIEF) 50 MCG/ACT nasal spray, Spray 1 spray into both nostrils daily, Disp: , Rfl:     acetaminophen (TYLENOL) 500 MG tablet, Take 500-1,000 mg by mouth every 6 hours as needed for mild pain, Disp: , Rfl:     bisacodyl (DULCOLAX) 5 MG EC tablet, Take 2 tablets at 3 pm the day before your procedure. If your procedure is before 11 am, take 2 additional tablets at 11 pm. If your procedure is after 11 am, take 2 additional tablets at 6 am. For additional instructions refer to your colonoscopy prep instructions., Disp: 4 tablet, Rfl: 0    gabapentin (NEURONTIN) 100 MG capsule, Take 2 capsules (200 mg) by mouth 3 times daily for 3 days, Disp: 10 capsule, Rfl: 0    polyethylene glycol (GOLYTELY) 236 g suspension, The night before the exam at 6 pm drink an 8-ounce glass every 15 minutes until the jug is half empty. If you arrive before 11 AM: Drink the other half of the Golytely jug at 11 PM night before procedure. If you arrive after 11 AM: Drink the other half of the Golytely jug at 6 AM day of procedure. For additional instructions refer to your colonoscopy prep instructions., Disp: 4000 mL, Rfl: 0    SOCIAL HISTORY:  Social History     Socioeconomic History    Marital status:      Spouse name: Not on file    Number of children: Not on file    Years of education: Not on file    Highest education level: Not  on file   Occupational History    Not on file   Tobacco Use    Smoking status: Never    Smokeless tobacco: Never   Vaping Use    Vaping status: Not on file   Substance and Sexual Activity    Alcohol use: Yes     Comment: occasional use    Drug use: Yes     Types: Marijuana    Sexual activity: Yes     Partners: Female   Other Topics Concern    Parent/sibling w/ CABG, MI or angioplasty before 65F 55M? No   Social History Narrative    Not on file     Social Determinants of Health     Financial Resource Strain: Not on file   Food Insecurity: Not on file   Transportation Needs: Not on file   Physical Activity: Not on file   Stress: Not on file   Social Connections: Not on file   Intimate Partner Violence: Not on file   Housing Stability: Not on file       FAMILY HISTORY:  No family history on file.    Past/family/social history reviewed and no changes    PHYSICAL EXAMINATION:  Constitutional: aaox3, cooperative, pleasant, not dyspneic/diaphoretic, no acute distress  Vitals reviewed: BP (!) 133/90   Pulse 90   Ht 1.829 m (6')   Wt 64.7 kg (142 lb 11.2 oz)   SpO2 100%   BMI 19.35 kg/m    Wt:   Wt Readings from Last 2 Encounters:   05/10/23 64.7 kg (142 lb 11.2 oz)   04/07/23 61.2 kg (135 lb)      Eyes: Sclera anicteric/injected  Ears/nose/mouth/throat: Normal oropharynx without ulcers or exudate, mucus membranes moist, hearing intact  Neck: supple, thyroid normal size  CV: No edema  Respiratory: Unlabored breathing  Lymph: No axillary, submandibular, supraclavicular or inguinal lymphadenopathy  Abd:  Nondistended, +bs, no hepatosplenomegaly, nontender, no peritoneal signs  Skin: warm, perfused, no jaundice  Psych: Normal affect  MSK: Normal gait      PERTINENT STUDIES:  Most recent CBC:  Recent Labs   Lab Test 05/03/23  1012 03/30/23  1005   WBC 6.2 8.1   HGB 11.7* 11.6*   HCT 39.2* 39.7*   * 571*     Most recent hepatic panel:  Recent Labs   Lab Test 05/03/23  1012 03/30/23  1005   ALT 10 14   AST 13 16      Most recent creatinine:  Recent Labs   Lab Test 05/03/23  1012 03/30/23  1005   CR 0.74 0.91             Answers for HPI/ROS submitted by the patient on 5/5/2023  General Symptoms: Yes  Skin Symptoms: Yes  HENT Symptoms: No  EYE SYMPTOMS: No  HEART SYMPTOMS: No  LUNG SYMPTOMS: No  INTESTINAL SYMPTOMS: Yes  URINARY SYMPTOMS: Yes  REPRODUCTIVE SYMPTOMS: No  SKELETAL SYMPTOMS: No  BLOOD SYMPTOMS: Yes  NERVOUS SYSTEM SYMPTOMS: No  MENTAL HEALTH SYMPTOMS: No  Fever: No  Loss of appetite: No  Weight loss: Yes  Weight gain: No  Fatigue: Yes  Night sweats: No  Chills: No  Increased stress: No  Excessive hunger: No  Excessive thirst: No  Feeling hot or cold when others believe the temperature is normal: No  Loss of height: No  Post-operative complications: No  Surgical site pain: No  Hallucinations: No  Change in or Loss of Energy: No  Hyperactivity: No  Confusion: No  Changes in hair: No  Changes in moles/birth marks: No  Itching: Yes  Rashes: No  Changes in nails: No  Acne: No  Change in facial hair: No  Warts: No  Non-healing sores: No  Scarring: No  Flaking of skin: No  Color changes of hands/feet in cold : No  Sun sensitivity: No  Skin thickening: No  Heart burn or indigestion: No  Nausea: No  Vomiting: No  Abdominal pain: Yes  Bloating: Yes  Constipation: No  Diarrhea: No  Blood in stool: No  Black stools: No  Rectal or Anal pain: No  Fecal incontinence: No  Yellowing of skin or eyes: No  Vomit with blood: No  Change in stools: No  Trouble holding urine or incontinence: No  Pain or burning: Yes  Trouble starting or stopping: No  Increased frequency of urination: No  Blood in urine: No  Decreased frequency of urination: No  Frequent nighttime urination: No  Flank pain: No  Difficulty emptying bladder: No  Anemia: Yes  Swollen glands: No  Easy bleeding or bruising: No  Edema or swelling: No          Again, thank you for allowing me to participate in the care of your patient.      Sincerely,    Lazaro Suh  MD Ahmet

## 2023-05-10 NOTE — TELEPHONE ENCOUNTER
[employer] message with next step instructions provided. Therapy plan and standing lab orders placed. TB Quantiferon Gold and Hepatitis B labs completed 5/3/23. [employer] message sent to infusion finance to initiate prior [place] [facility]

## 2023-05-11 LAB
DEPRECATED CALCIDIOL+CALCIFEROL SERPL-MC: 31 UG/L (ref 20–75)
IGA SERPL-MCNC: 231 MG/DL (ref 84–499)
TTG IGA SER-ACNC: 0.4 U/ML
TTG IGG SER-ACNC: 0.7 U/ML

## 2023-05-13 LAB — TPMT BLD-CCNC: 32.8 U/ML

## 2023-05-14 ENCOUNTER — HEALTH MAINTENANCE LETTER (OUTPATIENT)
Age: 38
End: 2023-05-14

## 2023-05-15 NOTE — PROGRESS NOTES
Disease State Management Encounter:                          Karlos Terrazas is a 37 year old male called for for an initial visit. He was referred to me from Dr. Leo.      Reason for visit: Discuss start of Remicade.    Crohn's Disease: Colonoscopy done on 4/7/2023. Diagnosed with Moderate to severe Crohn's ileitis (with stricturing) and colitis (histologically only). Has evidence of multi-focal CD in small intestines with stricturing and both chronic/active changes. Met with Dr. Leo on 5/10/23 to discuss disease and medications - he recommended anti-TNF given the extent of involvement.     Today he reports that he was having some symptoms this weekend, and but today he has mild abdominal pain and fatigue.     Prebiologic labs done 5/10/23  TB negative 5/3/23   Hep B serologies done 5/3/23  Hep C - not done     Today's Vitals: There were no vitals taken for this visit.    Assessment/Plan:    Today we discussed infliximab in more detail. As of today, the PA is still pending. Prebiologic labs are done and appropriate. Hep C was not done, therefore I will place a future order to be done with his next labs. Discussed mechanism of action, maintenance labs every 3 months, side effects, time to efficacy. We also discussed what to expect with the infusion and premedication. He will avoid live vaccines. We did briefly review vaccines and I discussed that while on infliximab, he would qualify for another COVID bivalent vaccine, Shingrix, Prevnar 20. He is also overdue for a Tdap vaccine. We can discuss health maintainance in more detail once he is on infliximab.     Follow-up: [facility] follow up after infliximab approved     I spent 26 minutes with this patient today. All changes were made via verbal approval with Dr. Leo. A copy of the visit note was provided to the patient's provider(s).    A summary of these recommendations was sent via [employer].    Zo Leo (Khazaeli), Pharm.D., [facility]   Medication Therapy  Management Pharmacist     Post Discharge Medication Reconciliation Status: discharge medications reconciled and changed, per note/orders.     Medication Therapy Recommendations  Crohn's disease (H)    Rationale: Preventive therapy - Needs additional medication therapy - Indication   Recommendation: Order Vaccine - Tdap due   Status: Patient Agreed - Adherence/Education

## 2023-05-16 ENCOUNTER — VIRTUAL VISIT (OUTPATIENT)
Dept: PHARMACY | Facility: CLINIC | Age: 38
End: 2023-05-16
Attending: INTERNAL MEDICINE
Payer: COMMERCIAL

## 2023-05-16 DIAGNOSIS — K50.919 CROHN'S DISEASE WITH COMPLICATION, UNSPECIFIED GASTROINTESTINAL TRACT LOCATION (H): Primary | ICD-10-CM

## 2023-05-16 PROCEDURE — 99207 PR NO CHARGE LOS: CPT | Performed by: PHARMACIST

## 2023-05-16 RX ORDER — CETIRIZINE HYDROCHLORIDE 10 MG/1
10 TABLET ORAL DAILY PRN
COMMUNITY
End: 2024-01-26

## 2023-05-24 ENCOUNTER — MYC MEDICAL ADVICE (OUTPATIENT)
Dept: GASTROENTEROLOGY | Facility: CLINIC | Age: 38
End: 2023-05-24
Payer: COMMERCIAL

## 2023-05-24 NOTE — TELEPHONE ENCOUNTER
Received update from Infusion Finance Team that the Infliximab infusions have been approved. Attempted to contact the patient to provide update. No answer, left detailed message with instructions to schedule first infusions. Also sent [employer] message with scheduling instructions.

## 2023-05-24 NOTE — TELEPHONE ENCOUNTER
Per Dr. Leo's recommendation, level check at week 14. Reminder message sent to self for level to be checked on 9/12.

## 2023-05-25 ENCOUNTER — MYC MEDICAL ADVICE (OUTPATIENT)
Dept: GASTROENTEROLOGY | Facility: CLINIC | Age: 38
End: 2023-05-25
Payer: COMMERCIAL

## 2023-05-25 DIAGNOSIS — K50.018 CROHN'S DISEASE OF ILEUM WITH OTHER COMPLICATION (H): Primary | ICD-10-CM

## 2023-05-26 NOTE — TELEPHONE ENCOUNTER
Lazaro Leo MD  You 1 hour ago (6:07 AM)     JH  Can you put in a referral for derm? Thanks!        Dermatology referral ordered.

## 2023-06-05 NOTE — TELEPHONE ENCOUNTER
Received update from infusion finance that the infusions have not been approved, despite approval notification that was received on 5/24.   Attempted to contact the patient to discuss miscommunication, no answer. Left detailed message and also sent mychart message.

## 2023-06-09 ENCOUNTER — CARE COORDINATION (OUTPATIENT)
Dept: GASTROENTEROLOGY | Facility: CLINIC | Age: 38
End: 2023-06-09
Payer: COMMERCIAL

## 2023-06-15 ENCOUNTER — TELEPHONE (OUTPATIENT)
Dept: GASTROENTEROLOGY | Facility: CLINIC | Age: 38
End: 2023-06-15
Payer: COMMERCIAL

## 2023-06-15 NOTE — TELEPHONE ENCOUNTER
Received voicemail from patient inquiring on an update regarding the infusions. Discussed with infusion finance who has received verbal approval over the phone, but is currently waiting for the official letter to be faxed. Attempted to contact the patient to provide update, no answer. Left detailed message with update and instructions to keep current scheduled infusion appointments.

## 2023-06-20 ENCOUNTER — INFUSION THERAPY VISIT (OUTPATIENT)
Dept: INFUSION THERAPY | Facility: CLINIC | Age: 38
End: 2023-06-20
Attending: INTERNAL MEDICINE
Payer: COMMERCIAL

## 2023-06-20 VITALS
HEART RATE: 89 BPM | BODY MASS INDEX: 19.6 KG/M2 | DIASTOLIC BLOOD PRESSURE: 69 MMHG | RESPIRATION RATE: 16 BRPM | WEIGHT: 144.5 LBS | TEMPERATURE: 98.6 F | SYSTOLIC BLOOD PRESSURE: 112 MMHG

## 2023-06-20 DIAGNOSIS — K50.90 CROHN'S DISEASE (H): Primary | ICD-10-CM

## 2023-06-20 LAB
ALBUMIN SERPL BCG-MCNC: 4 G/DL (ref 3.5–5.2)
ALP SERPL-CCNC: 98 U/L (ref 40–129)
ALT SERPL W P-5'-P-CCNC: 9 U/L (ref 0–70)
AST SERPL W P-5'-P-CCNC: 18 U/L (ref 0–45)
BASOPHILS # BLD AUTO: 0.1 10E3/UL (ref 0–0.2)
BASOPHILS NFR BLD AUTO: 1 %
BILIRUB DIRECT SERPL-MCNC: <0.2 MG/DL (ref 0–0.3)
BILIRUB SERPL-MCNC: 0.2 MG/DL
CRP SERPL-MCNC: 14.5 MG/L
EOSINOPHIL # BLD AUTO: 0.1 10E3/UL (ref 0–0.7)
EOSINOPHIL NFR BLD AUTO: 2 %
ERYTHROCYTE [DISTWIDTH] IN BLOOD BY AUTOMATED COUNT: 14.2 % (ref 10–15)
ERYTHROCYTE [SEDIMENTATION RATE] IN BLOOD BY WESTERGREN METHOD: 13 MM/HR (ref 0–15)
HCT VFR BLD AUTO: 35.9 % (ref 40–53)
HGB BLD-MCNC: 11.2 G/DL (ref 13.3–17.7)
IMM GRANULOCYTES # BLD: 0 10E3/UL
IMM GRANULOCYTES NFR BLD: 0 %
LYMPHOCYTES # BLD AUTO: 1.7 10E3/UL (ref 0.8–5.3)
LYMPHOCYTES NFR BLD AUTO: 26 %
MCH RBC QN AUTO: 24 PG (ref 26.5–33)
MCHC RBC AUTO-ENTMCNC: 31.2 G/DL (ref 31.5–36.5)
MCV RBC AUTO: 77 FL (ref 78–100)
MONOCYTES # BLD AUTO: 0.5 10E3/UL (ref 0–1.3)
MONOCYTES NFR BLD AUTO: 8 %
NEUTROPHILS # BLD AUTO: 4.1 10E3/UL (ref 1.6–8.3)
NEUTROPHILS NFR BLD AUTO: 63 %
NRBC # BLD AUTO: 0 10E3/UL
NRBC BLD AUTO-RTO: 0 /100
PLATELET # BLD AUTO: 465 10E3/UL (ref 150–450)
PROT SERPL-MCNC: 6.8 G/DL (ref 6.4–8.3)
RBC # BLD AUTO: 4.67 10E6/UL (ref 4.4–5.9)
WBC # BLD AUTO: 6.5 10E3/UL (ref 4–11)

## 2023-06-20 PROCEDURE — 258N000003 HC RX IP 258 OP 636: Performed by: INTERNAL MEDICINE

## 2023-06-20 PROCEDURE — 85652 RBC SED RATE AUTOMATED: CPT | Performed by: INTERNAL MEDICINE

## 2023-06-20 PROCEDURE — 250N000011 HC RX IP 250 OP 636: Performed by: INTERNAL MEDICINE

## 2023-06-20 PROCEDURE — 96415 CHEMO IV INFUSION ADDL HR: CPT

## 2023-06-20 PROCEDURE — 85025 COMPLETE CBC W/AUTO DIFF WBC: CPT | Performed by: INTERNAL MEDICINE

## 2023-06-20 PROCEDURE — 80076 HEPATIC FUNCTION PANEL: CPT | Performed by: INTERNAL MEDICINE

## 2023-06-20 PROCEDURE — 86140 C-REACTIVE PROTEIN: CPT | Performed by: INTERNAL MEDICINE

## 2023-06-20 PROCEDURE — 96413 CHEMO IV INFUSION 1 HR: CPT

## 2023-06-20 PROCEDURE — 36415 COLL VENOUS BLD VENIPUNCTURE: CPT | Performed by: INTERNAL MEDICINE

## 2023-06-20 RX ORDER — ACETAMINOPHEN 325 MG/1
650 TABLET ORAL ONCE
Status: CANCELLED
Start: 2023-07-04 | End: 2023-07-04

## 2023-06-20 RX ORDER — DIPHENHYDRAMINE HCL 25 MG
25 CAPSULE ORAL ONCE
Status: CANCELLED
Start: 2023-07-04 | End: 2023-07-04

## 2023-06-20 RX ORDER — EPINEPHRINE 1 MG/ML
0.3 INJECTION, SOLUTION INTRAMUSCULAR; SUBCUTANEOUS EVERY 5 MIN PRN
Status: CANCELLED | OUTPATIENT
Start: 2023-07-04

## 2023-06-20 RX ORDER — ALBUTEROL SULFATE 90 UG/1
1-2 AEROSOL, METERED RESPIRATORY (INHALATION)
Status: CANCELLED
Start: 2023-07-04

## 2023-06-20 RX ORDER — ALBUTEROL SULFATE 0.83 MG/ML
2.5 SOLUTION RESPIRATORY (INHALATION)
Status: CANCELLED | OUTPATIENT
Start: 2023-07-04

## 2023-06-20 RX ORDER — METHYLPREDNISOLONE SODIUM SUCCINATE 125 MG/2ML
125 INJECTION, POWDER, LYOPHILIZED, FOR SOLUTION INTRAMUSCULAR; INTRAVENOUS
Status: CANCELLED
Start: 2023-07-04

## 2023-06-20 RX ORDER — MEPERIDINE HYDROCHLORIDE 25 MG/ML
25 INJECTION INTRAMUSCULAR; INTRAVENOUS; SUBCUTANEOUS EVERY 30 MIN PRN
Status: CANCELLED | OUTPATIENT
Start: 2023-07-04

## 2023-06-20 RX ORDER — DIPHENHYDRAMINE HYDROCHLORIDE 50 MG/ML
50 INJECTION INTRAMUSCULAR; INTRAVENOUS
Status: CANCELLED
Start: 2023-07-04

## 2023-06-20 RX ADMIN — INFLIXIMAB 300 MG: 100 INJECTION, POWDER, LYOPHILIZED, FOR SOLUTION INTRAVENOUS at 13:26

## 2023-06-20 NOTE — PATIENT INSTRUCTIONS
Dear Karlos Terrazas    Thank you for choosing [facility] ([facility]) for your infusion.  The following information is a summary of our appointment as well as important reminders.      EDUCATION POST BIOLOGICAL/CHEMOTHERAPY INFUSION  Call the triage nurse at your clinic or seek medical attention if you have chills and/or temperature greater than or equal to 100.5, uncontrolled nausea/vomiting, diarrhea, constipation, dizziness, shortness of breath, chest pain, heart palpitations, weakness or any other new or concerning symptoms, questions or concerns.  You can not have any live virus vaccines prior to or during treatment or up to 6 months post infusion.  If you have an upcoming surgery, medical procedure or dental procedure during treatment, this should be discussed with your ordering physician and your surgeon/dentist.  If you are having any concerning symptom, if you are unsure if you should get your next infusion or wish to speak to a provider before your next infusion, please call your care coordinator or triage nurse at your clinic to notify them so we can adequately serve you.     We look forward in seeing you on your next appointment here at [facility] ([facility]).  Please don t hesitate to call us at [phone] to reschedule any of your appointments or to speak with one of the [facility] registered nurses.  It was a pleasure taking care of you today.    Sincerely,    [facility]  [facility]  [address] [place] [address]  [id number]  Phone:  [phone]

## 2023-06-20 NOTE — PROGRESS NOTES
Nursing Note  Karlos Terrazas presents today to [facility] for: Remicade  During today's [facility] appointment, orders from Dr. Leo were completed.  Frequency: first dose; next dose in 2 weeks    Progress note:  Patient identification verified by name and date of birth.  Assessment completed.  Vitals recorded in Doc Flowsheets.  Patient was provided with education regarding medication/procedure and possible side effects.  Patient verbalized understanding.     present during visit today: Not Applicable.    Treatment Conditions: ~~~ NOTE: If the patient answers yes to any of the questions below, hold the infusion and contact ordering provider or on-call provider.    1. Have you recently had an elevated temperature, fever, chills, productive cough, coughing for 3 weeks or longer or hemoptysis,  abnormal vital signs, night sweats,  chest pain or have you noticed a decrease in your appetite, unexplained weight loss or fatigue? No  2. Do you have any open wounds or new incisions? No  3. Do you have any upcoming hospitalizations or surgeries? Does not include esophagogastroduodenoscopy, colonoscopy, endoscopic retrograde cholangiopancreatography (ERCP), endoscopic ultrasound (EUS), dental procedures or joint aspiration/steroid injections No  4. Do you currently have any signs of illness or infection or are you on any antibiotics? No  5. Have you had any new, sudden or worsening abdominal pain? No  6. Have you or anyone in your household received a live vaccination in the past 4 weeks? Please note: No live vaccines while on biologic/chemotherapy until 6 months after the last treatment. Patient can receive the flu vaccine (shot only), pneumovax and the Covid vaccine. It is optimal for the patient to get these vaccines mid cycle, but they can be given at any time as long as it is not on the day of the infusion. No  7. Have you recently been diagnosed with  any new nervous system diseases (ie. Multiple sclerosis, Guillain [place], seizures, neurological changes) or cancer diagnosis? Are you on any form of radiation or chemotherapy? No  8. Are you pregnant or breast feeding or do you have plans of pregnancy in the future? No  9. Have you been having any signs of worsening depression or suicidal ideations?  (benlysta only) No  10. Have there been any other new onset medical symptoms? No  11. Have you had any new blood clots? (IVIG only) No      Premedications: were not ordered.    Drug Waste Record: No    Infusion length and rate:  infusion given over approximately 2 hours    Labs: were drawn per orders.     Vascular access: peripheral IV placed today.    Is the next appt scheduled? yes    Post Infusion Assessment:  Patient tolerated infusion without incident.     Discharge Plan:   Follow up plan of care with: ongoing infusions at [facility].  Discharge instructions were reviewed with patient.  Patient/representative verbalized understanding of discharge instructions and all questions answered.  Patient discharged from [facility] in stable condition.    Mindy Desir RN       Administrations This Visit     inFLIXimab (REMICADE) 300 mg in sodium chloride 0.9 % 275 mL infusion     Admin Date  06/20/2023 Action  $New Bag Dose  300 mg Rate  137.5 mL/hr Route  Intravenous Administered By  Mindy Desir RN

## 2023-07-06 ENCOUNTER — INFUSION THERAPY VISIT (OUTPATIENT)
Dept: INFUSION THERAPY | Facility: CLINIC | Age: 38
End: 2023-07-06
Attending: INTERNAL MEDICINE
Payer: COMMERCIAL

## 2023-07-06 VITALS
SYSTOLIC BLOOD PRESSURE: 112 MMHG | RESPIRATION RATE: 16 BRPM | WEIGHT: 143.7 LBS | OXYGEN SATURATION: 100 % | HEART RATE: 79 BPM | DIASTOLIC BLOOD PRESSURE: 69 MMHG | BODY MASS INDEX: 19.49 KG/M2 | TEMPERATURE: 97.8 F

## 2023-07-06 DIAGNOSIS — K50.90 CROHN'S DISEASE (H): Primary | ICD-10-CM

## 2023-07-06 LAB
ALBUMIN SERPL BCG-MCNC: 4.2 G/DL (ref 3.5–5.2)
ALP SERPL-CCNC: 95 U/L (ref 40–129)
ALT SERPL W P-5'-P-CCNC: 11 U/L (ref 0–70)
AST SERPL W P-5'-P-CCNC: 18 U/L (ref 0–45)
BASOPHILS # BLD AUTO: 0.1 10E3/UL (ref 0–0.2)
BASOPHILS NFR BLD AUTO: 1 %
BILIRUB DIRECT SERPL-MCNC: <0.2 MG/DL (ref 0–0.3)
BILIRUB SERPL-MCNC: 0.2 MG/DL
CRP SERPL-MCNC: 3.5 MG/L
EOSINOPHIL # BLD AUTO: 0.1 10E3/UL (ref 0–0.7)
EOSINOPHIL NFR BLD AUTO: 1 %
ERYTHROCYTE [DISTWIDTH] IN BLOOD BY AUTOMATED COUNT: 14.2 % (ref 10–15)
ERYTHROCYTE [SEDIMENTATION RATE] IN BLOOD BY WESTERGREN METHOD: 8 MM/HR (ref 0–15)
HCT VFR BLD AUTO: 37.7 % (ref 40–53)
HGB BLD-MCNC: 11.7 G/DL (ref 13.3–17.7)
IMM GRANULOCYTES # BLD: 0 10E3/UL
IMM GRANULOCYTES NFR BLD: 0 %
LYMPHOCYTES # BLD AUTO: 2.1 10E3/UL (ref 0.8–5.3)
LYMPHOCYTES NFR BLD AUTO: 28 %
MCH RBC QN AUTO: 24.1 PG (ref 26.5–33)
MCHC RBC AUTO-ENTMCNC: 31 G/DL (ref 31.5–36.5)
MCV RBC AUTO: 78 FL (ref 78–100)
MONOCYTES # BLD AUTO: 0.5 10E3/UL (ref 0–1.3)
MONOCYTES NFR BLD AUTO: 7 %
NEUTROPHILS # BLD AUTO: 4.8 10E3/UL (ref 1.6–8.3)
NEUTROPHILS NFR BLD AUTO: 63 %
NRBC # BLD AUTO: 0 10E3/UL
NRBC BLD AUTO-RTO: 0 /100
PLATELET # BLD AUTO: 487 10E3/UL (ref 150–450)
PROT SERPL-MCNC: 6.7 G/DL (ref 6.4–8.3)
RBC # BLD AUTO: 4.86 10E6/UL (ref 4.4–5.9)
WBC # BLD AUTO: 7.5 10E3/UL (ref 4–11)

## 2023-07-06 PROCEDURE — 36415 COLL VENOUS BLD VENIPUNCTURE: CPT | Performed by: INTERNAL MEDICINE

## 2023-07-06 PROCEDURE — 86140 C-REACTIVE PROTEIN: CPT | Performed by: INTERNAL MEDICINE

## 2023-07-06 PROCEDURE — 258N000003 HC RX IP 258 OP 636: Performed by: INTERNAL MEDICINE

## 2023-07-06 PROCEDURE — 96413 CHEMO IV INFUSION 1 HR: CPT

## 2023-07-06 PROCEDURE — 85025 COMPLETE CBC W/AUTO DIFF WBC: CPT | Performed by: INTERNAL MEDICINE

## 2023-07-06 PROCEDURE — 85652 RBC SED RATE AUTOMATED: CPT | Performed by: INTERNAL MEDICINE

## 2023-07-06 PROCEDURE — 250N000011 HC RX IP 250 OP 636: Mod: JZ | Performed by: INTERNAL MEDICINE

## 2023-07-06 PROCEDURE — 96415 CHEMO IV INFUSION ADDL HR: CPT

## 2023-07-06 PROCEDURE — 80076 HEPATIC FUNCTION PANEL: CPT | Performed by: INTERNAL MEDICINE

## 2023-07-06 RX ORDER — ACETAMINOPHEN 325 MG/1
650 TABLET ORAL ONCE
Status: CANCELLED
Start: 2023-07-18 | End: 2023-07-18

## 2023-07-06 RX ORDER — DIPHENHYDRAMINE HYDROCHLORIDE 50 MG/ML
50 INJECTION INTRAMUSCULAR; INTRAVENOUS
Status: CANCELLED
Start: 2023-07-18

## 2023-07-06 RX ORDER — MEPERIDINE HYDROCHLORIDE 25 MG/ML
25 INJECTION INTRAMUSCULAR; INTRAVENOUS; SUBCUTANEOUS EVERY 30 MIN PRN
Status: CANCELLED | OUTPATIENT
Start: 2023-07-18

## 2023-07-06 RX ORDER — ALBUTEROL SULFATE 90 UG/1
1-2 AEROSOL, METERED RESPIRATORY (INHALATION)
Status: CANCELLED
Start: 2023-07-18

## 2023-07-06 RX ORDER — EPINEPHRINE 1 MG/ML
0.3 INJECTION, SOLUTION INTRAMUSCULAR; SUBCUTANEOUS EVERY 5 MIN PRN
Status: CANCELLED | OUTPATIENT
Start: 2023-07-18

## 2023-07-06 RX ORDER — DIPHENHYDRAMINE HCL 25 MG
25 CAPSULE ORAL ONCE
Status: CANCELLED
Start: 2023-07-18 | End: 2023-07-18

## 2023-07-06 RX ORDER — METHYLPREDNISOLONE SODIUM SUCCINATE 125 MG/2ML
125 INJECTION, POWDER, LYOPHILIZED, FOR SOLUTION INTRAMUSCULAR; INTRAVENOUS
Status: CANCELLED
Start: 2023-07-18

## 2023-07-06 RX ORDER — ALBUTEROL SULFATE 0.83 MG/ML
2.5 SOLUTION RESPIRATORY (INHALATION)
Status: CANCELLED | OUTPATIENT
Start: 2023-07-18

## 2023-07-06 RX ADMIN — INFLIXIMAB 300 MG: 100 INJECTION, POWDER, LYOPHILIZED, FOR SOLUTION INTRAVENOUS at 12:29

## 2023-07-06 ASSESSMENT — PAIN SCALES - GENERAL: PAINLEVEL: NO PAIN (1)

## 2023-07-06 NOTE — PATIENT INSTRUCTIONS
Dear Karlos Terrazas    Thank you for choosing [facility] ([facility]) for your infusion.  The following information is a summary of our appointment as well as important reminders.      We look forward in seeing you on your next appointment here at [facility] ([facility]).  Please don t hesitate to call us at [phone] to reschedule any of your appointments or to speak with one of the [facility] registered nurses.  It was a pleasure taking care of you today.    Sincerely,    [facility]  [facility]  [address] [place] [address]  [address]  Phone:  [phone]

## 2023-07-06 NOTE — PROGRESS NOTES
Nursing Note  Karlos JOSÉ Terrazas presents today [facility] for:   Chief Complaint   Patient presents with     Infusion     Remicade     During today's [facility] appointment, orders from Dr Leo were completed.  Frequency: second dose, next dose in 4 weeks    Progress note:  Patient identification verified by name and date of birth.  Assessment completed.  Vitals recorded in Doc Flowsheets.  Patient was provided with education regarding medication/procedure and possible side effects.  Patient verbalized understanding.     present during visit today: Not Applicable.    Treatment Conditions: ~~~ NOTE: If the patient answers yes to any of the questions below, hold the infusion and contact ordering provider or on-call provider.    1. Have you recently had an elevated temperature, fever, chills, productive cough, coughing for 3 weeks or longer or hemoptysis,  abnormal vital signs, night sweats,  chest pain or have you noticed a decrease in your appetite, unexplained weight loss or fatigue? No  2. Do you have any open wounds or new incisions? No  3. Do you have any upcoming hospitalizations or surgeries? Does not include esophagogastroduodenoscopy, colonoscopy, endoscopic retrograde cholangiopancreatography (ERCP), endoscopic ultrasound (EUS), dental procedures or joint aspiration/steroid injections No  4. Do you currently have any signs of illness or infection or are you on any antibiotics? No  5. Have you had any new, sudden or worsening abdominal pain? No  6. Have you or anyone in your household received a live vaccination in the past 4 weeks? Please note: No live vaccines while on biologic/chemotherapy until 6 months after the last treatment. Patient can receive the flu vaccine (shot only), pneumovax and the Covid vaccine. It is optimal for the patient to get these vaccines mid cycle, but they can be given at any time as long as it is not on the day  of the infusion. No  7. Have you recently been diagnosed with any new nervous system diseases (ie. Multiple sclerosis, Guillain [place], seizures, neurological changes) or cancer diagnosis? Are you on any form of radiation or chemotherapy? No  8. Are you pregnant or breast feeding or do you have plans of pregnancy in the future? NA  9. Have you been having any signs of worsening depression or suicidal ideations?  (benlysta only) NA  10. Have there been any other new onset medical symptoms? No    Premedications: were not ordered.    Drug Waste Record: No    Infusion length and rate:  infusion given over approximately 2 hours    Labs: were drawn per orders.     Vascular access: peripheral IV placed today.    Is the next appt scheduled? yes    Post Infusion Assessment:  Patient tolerated infusion without incident.  Site patent and intact, free from redness, edema or discomfort.  Access discontinued per protocol.  Biologic Infusion Post Education: Call the triage nurse at your clinic or seek medical attention if you have chills and/or temperature greater than or equal to 100.5, uncontrolled nausea/vomiting, diarrhea, constipation, dizziness, shortness of breath, chest pain, heart palpitations, weakness or any other new or concerning symptoms, questions or concerns.  You cannot have any live virus vaccines prior to or during treatment or up to 6 months post infusion.  If you have an upcoming surgery, medical procedure or dental procedure during treatment, this should be discussed with your ordering physician and your surgeon/dentist.  If you are having any concerning symptom, if you are unsure if you should get your next infusion or wish to speak to a provider before your next infusion, please call your care coordinator or triage nurse at your clinic to notify them so we can adequately serve you.     Discharge Plan:   Follow up plan of care with: ongoing infusions at [facility] and ordering  provider as scheduled.  Discharge instructions were reviewed with patient.  Patient/representative verbalized understanding of discharge instructions and all questions answered.  Patient discharged from [facility] in stable condition.    Marlyn Portillo RN       Administrations This Visit     inFLIXimab (REMICADE) 300 mg in sodium chloride 0.9 % 275 mL infusion     Admin Date  07/06/2023 Action  $New Bag Dose  300 mg Rate  137.5 mL/hr Route  Intravenous Administered By  Marlyn Portillo RN              /75 (BP Location: Left arm, Patient Position: Sitting, Cuff Size: Adult Regular)   Pulse 85   Temp 97.8  F (36.6  C) (Oral)   Resp 16   SpO2 100%

## 2023-07-19 ENCOUNTER — TELEPHONE (OUTPATIENT)
Dept: GASTROENTEROLOGY | Facility: CLINIC | Age: 38
End: 2023-07-19
Payer: COMMERCIAL

## 2023-07-19 NOTE — TELEPHONE ENCOUNTER
Sent inbasket message to [facility] scheduling pool to determine issue with moving up scheduled infusion to 8/2 rather than 8/3.

## 2023-07-19 NOTE — TELEPHONE ENCOUNTER
Received response from scheduling-- infusion has been rescheduled to 8/2. Updated MD and research coordinator.

## 2023-07-19 NOTE — TELEPHONE ENCOUNTER
----- Message from Priscilla Suresh MD sent at 7/18/2023  9:48 PM CDT -----  Regarding: FW: OPTIMIZE subject 003 - treatment plan change needed for the order  Thanks, Mercury.    I talked to Dr. Leo's RNCC, Kaz (cc'ed here), about this. She will look into it.    Thanks,  MATEO    ----- Message -----  From: Tramaine Rolle  Sent: 7/13/2023   4:17 PM CDT  To: Priscilla Suresh MD  Subject: OPTIMIZE subject 003 - treatment plan change#    Hi, Kerwin,   Here is the patient that I have talked about today.    Karlos was enrolled on Jun 20th and randomized into the iDose group, which will put his infusion schedule a bit more changeable depending on his blood draw level and his CDAI scores. His next visit from the algorithm is from July 19th to Aug 2nd for the protocol window but his current scheduled visit is Aug 3rd.     I have talked to the patient about this and informed him about the change of next infusion visit. And I have also called the infusion center to reschedule but I was told that the they can't schedule him for the week of July 24 th due to the treatment plan order was in her chart. Would you please help me to modify the order to accommodate the study.     Much thanks,    Dee    ----- Message -----  From: Lazaro Leo MD  Sent: 5/26/2023   7:39 AM CDT  To: Tramaine Rolle; Priscilla Suresh MD  Subject: RE: Joaquin for Crohn's study (optimize)             I spoke with Karlos. He is very interested in the study and would love to be a part of it. Please reach out to him    Lazaro    ----- Message -----  From: Tramaine Rolle  Sent: 5/24/2023   3:01 PM CDT  To: Lazaro Leo MD; #  Subject: RE: Joaquin for Crohn's study (optimize)             Thank you for the referral. I have put him in the prescreening log and will screen him for his eligibility.     Ned Price    ----- Message -----  From: Priscilla Suresh MD  Sent: 5/22/2023   4:18 PM CDT  To: Lazaro Leo MD; Tramaine Rolle  Subject: FW: Joaquin cho  Crohn's study (optimize)             Hi Mercury,    Can you please prescreen this person for OPTIMIZE?    Lazaro -- do you mind calling him to see if he's OK talking to a research coordinator about the study?  He would just be randomized into standard of care vs. getting additional IFX level data that would go to you and you can decide what to do with it.     Thanks,  E  ----- Message -----  From: Lazaro Leo MD  Sent: 5/22/2023  12:18 PM CDT  To: Priscilla Suresh MD  Subject: Joaquin for Crohn's study (optimize)                 Here he is

## 2023-07-20 ENCOUNTER — HOME INFUSION (PRE-WILLOW HOME INFUSION) (OUTPATIENT)
Dept: PHARMACY | Facility: CLINIC | Age: 38
End: 2023-07-20
Payer: COMMERCIAL

## 2023-07-20 NOTE — PROGRESS NOTES
Pt has coverage with [facility] and we'll have our PA team work on securing auth.    ([facility]) In reference to 07/19/2023 to check for remicade coverage.       Please contact Intake with any questions, [phone] or In Basket pool, [facility] ([phone]).

## 2023-08-01 ENCOUNTER — CARE COORDINATION (OUTPATIENT)
Dept: PHARMACY | Facility: CLINIC | Age: 38
End: 2023-08-01
Payer: COMMERCIAL

## 2023-08-01 NOTE — PROGRESS NOTES
Referred to [facility]    Karlos Terrazas, 1985  Medication (name, frequency and route):  Remicade [id number]   Start of Care Date: 9/27/23 (Confirmed with patient)  Infusion location: Patient Home  Skilled Nursing will be provided by: [facility]  @ [phone]    Rona Brooks RN

## 2023-08-02 ENCOUNTER — INFUSION THERAPY VISIT (OUTPATIENT)
Dept: INFUSION THERAPY | Facility: CLINIC | Age: 38
End: 2023-08-02
Attending: INTERNAL MEDICINE
Payer: COMMERCIAL

## 2023-08-02 VITALS
HEART RATE: 62 BPM | SYSTOLIC BLOOD PRESSURE: 108 MMHG | BODY MASS INDEX: 19.76 KG/M2 | DIASTOLIC BLOOD PRESSURE: 72 MMHG | TEMPERATURE: 98.6 F | RESPIRATION RATE: 16 BRPM | OXYGEN SATURATION: 100 % | WEIGHT: 145.7 LBS

## 2023-08-02 DIAGNOSIS — K50.90 CROHN'S DISEASE (H): ICD-10-CM

## 2023-08-02 DIAGNOSIS — K50.919 CROHN'S DISEASE WITH COMPLICATION, UNSPECIFIED GASTROINTESTINAL TRACT LOCATION (H): Primary | ICD-10-CM

## 2023-08-02 LAB
ALBUMIN SERPL BCG-MCNC: 4.4 G/DL (ref 3.5–5.2)
ALP SERPL-CCNC: 92 U/L (ref 40–129)
ALT SERPL W P-5'-P-CCNC: 12 U/L (ref 0–70)
AST SERPL W P-5'-P-CCNC: 20 U/L (ref 0–45)
BASOPHILS # BLD AUTO: 0 10E3/UL (ref 0–0.2)
BASOPHILS NFR BLD AUTO: 1 %
BILIRUB DIRECT SERPL-MCNC: <0.2 MG/DL (ref 0–0.3)
BILIRUB SERPL-MCNC: 0.3 MG/DL
CRP SERPL-MCNC: <3 MG/L
EOSINOPHIL # BLD AUTO: 0.1 10E3/UL (ref 0–0.7)
EOSINOPHIL NFR BLD AUTO: 2 %
ERYTHROCYTE [DISTWIDTH] IN BLOOD BY AUTOMATED COUNT: 14.4 % (ref 10–15)
ERYTHROCYTE [SEDIMENTATION RATE] IN BLOOD BY WESTERGREN METHOD: 11 MM/HR (ref 0–15)
HCT VFR BLD AUTO: 36.4 % (ref 40–53)
HGB BLD-MCNC: 11.1 G/DL (ref 13.3–17.7)
IMM GRANULOCYTES # BLD: 0 10E3/UL
IMM GRANULOCYTES NFR BLD: 0 %
LYMPHOCYTES # BLD AUTO: 1.9 10E3/UL (ref 0.8–5.3)
LYMPHOCYTES NFR BLD AUTO: 35 %
MCH RBC QN AUTO: 23.6 PG (ref 26.5–33)
MCHC RBC AUTO-ENTMCNC: 30.5 G/DL (ref 31.5–36.5)
MCV RBC AUTO: 77 FL (ref 78–100)
MONOCYTES # BLD AUTO: 0.5 10E3/UL (ref 0–1.3)
MONOCYTES NFR BLD AUTO: 9 %
NEUTROPHILS # BLD AUTO: 2.8 10E3/UL (ref 1.6–8.3)
NEUTROPHILS NFR BLD AUTO: 53 %
NRBC # BLD AUTO: 0 10E3/UL
NRBC BLD AUTO-RTO: 0 /100
PLATELET # BLD AUTO: 372 10E3/UL (ref 150–450)
PROT SERPL-MCNC: 7 G/DL (ref 6.4–8.3)
RBC # BLD AUTO: 4.7 10E6/UL (ref 4.4–5.9)
WBC # BLD AUTO: 5.4 10E3/UL (ref 4–11)

## 2023-08-02 PROCEDURE — 85025 COMPLETE CBC W/AUTO DIFF WBC: CPT | Performed by: INTERNAL MEDICINE

## 2023-08-02 PROCEDURE — 85652 RBC SED RATE AUTOMATED: CPT | Performed by: INTERNAL MEDICINE

## 2023-08-02 PROCEDURE — 36415 COLL VENOUS BLD VENIPUNCTURE: CPT | Performed by: INTERNAL MEDICINE

## 2023-08-02 PROCEDURE — 82040 ASSAY OF SERUM ALBUMIN: CPT | Performed by: INTERNAL MEDICINE

## 2023-08-02 PROCEDURE — 96415 CHEMO IV INFUSION ADDL HR: CPT

## 2023-08-02 PROCEDURE — 96413 CHEMO IV INFUSION 1 HR: CPT

## 2023-08-02 PROCEDURE — 86140 C-REACTIVE PROTEIN: CPT | Performed by: INTERNAL MEDICINE

## 2023-08-02 PROCEDURE — 258N000003 HC RX IP 258 OP 636: Performed by: INTERNAL MEDICINE

## 2023-08-02 PROCEDURE — 250N000011 HC RX IP 250 OP 636: Mod: JZ | Performed by: INTERNAL MEDICINE

## 2023-08-02 RX ORDER — DIPHENHYDRAMINE HCL 25 MG
25 CAPSULE ORAL ONCE
Start: 2023-09-26 | End: 2023-09-26

## 2023-08-02 RX ORDER — EPINEPHRINE 1 MG/ML
0.3 INJECTION, SOLUTION INTRAMUSCULAR; SUBCUTANEOUS EVERY 5 MIN PRN
OUTPATIENT
Start: 2023-09-26

## 2023-08-02 RX ORDER — ALBUTEROL SULFATE 0.83 MG/ML
2.5 SOLUTION RESPIRATORY (INHALATION)
OUTPATIENT
Start: 2023-09-26

## 2023-08-02 RX ORDER — ACETAMINOPHEN 325 MG/1
650 TABLET ORAL ONCE
Start: 2023-09-26 | End: 2023-09-26

## 2023-08-02 RX ORDER — MEPERIDINE HYDROCHLORIDE 25 MG/ML
25 INJECTION INTRAMUSCULAR; INTRAVENOUS; SUBCUTANEOUS EVERY 30 MIN PRN
OUTPATIENT
Start: 2023-09-26

## 2023-08-02 RX ORDER — METHYLPREDNISOLONE SODIUM SUCCINATE 125 MG/2ML
125 INJECTION, POWDER, LYOPHILIZED, FOR SOLUTION INTRAMUSCULAR; INTRAVENOUS
Start: 2023-09-26

## 2023-08-02 RX ORDER — DIPHENHYDRAMINE HYDROCHLORIDE 50 MG/ML
50 INJECTION INTRAMUSCULAR; INTRAVENOUS
Start: 2023-09-26

## 2023-08-02 RX ORDER — ALBUTEROL SULFATE 90 UG/1
1-2 AEROSOL, METERED RESPIRATORY (INHALATION)
Start: 2023-09-26

## 2023-08-02 RX ADMIN — INFLIXIMAB 300 MG: 100 INJECTION, POWDER, LYOPHILIZED, FOR SOLUTION INTRAVENOUS at 12:25

## 2023-08-02 NOTE — PROGRESS NOTES
Infusion Nursing Note:  Karlos Terrazas presents today for Remicade.    Patient seen by provider today: No   present during visit today: Not Applicable.    Note: Pt given Remicade over 2 hours without incident.      Intravenous Access:  Labs drawn without difficulty.  Peripheral IV placed.    Treatment Conditions:  ~~~ NOTE: If the patient answers yes to any of the questions below, hold the infusion and contact ordering provider or on-call provider.    Do you currently have any signs of illness or infection or are you on any antibiotics? No  Have you recently had an elevated temperature, fever, chills, productive cough, coughing for 3 weeks or longer or hemoptysis, abnormal vital signs, night sweats, chest pain or have you noticed a decrease in your appetite, unexplained weight loss or fatigue? No  Have you had any new, sudden, or worsening abdominal pain? No  Do you have any open wounds or new incisions? (exclude for patients with hidradenitis suppurativa) No  Have you recently been diagnosed with any new nervous system diseases (ie. Multiple sclerosis, Guillain [place], seizures, neurological changes) or cancer diagnosis? Are you on any form of radiation or chemotherapy? No  Have there been any other new onset medical symptoms? No  Are you pregnant or breast feeding or do you have plans of pregnancy in the future? No; N/A  Do you have any upcoming hospitalizations or surgeries? Does not include esophagogastroduodenoscopy, colonoscopy, endoscopic retrograde cholangiopancreatography (ERCP), endoscopic ultrasound (EUS), dental procedures (including cleanings, fillings, implants, extractions)  or joint aspiration/steroid injections No  Have you or anyone in your household received a live vaccination in the past 4 weeks? Please note: No live vaccines while on biologic/chemotherapy until 6 months after the last treatment. Patient can receive the flu vaccine (shot only).  It is optimal for the patient to get it  mid cycle, but it can be given at any time as long as it is not on the day of the infusion. No  If applicable to prescribed medication, confirm negative PPD or quantiferon gold MTB. If positive, verify has negative chest x-ray or the patient is at least 4 weeks post initiation of INH/B6 therapy and have clearance from provider before infusion (Y/N:[id number])  If applicable to prescribed medication, confirm negative hepatitis B surface antigen or hepatitis C. If positive, clearance from provider before infusion. (Y/N: 685301)  Rheumatology patients receiving tocilizumab (Actemra): If labs were drawn within the past week, hold dosing until cleared to infuse If AST/ALT > 2 X upper limit normal; ANC < 1.0.  N/A  Patients receiving belimumab (Benlysta): Have you been having any signs of worsening depression or suicidal ideations?  N/A        Post Infusion Assessment:  Patient tolerated infusion without incident.  Blood return noted pre and post infusion.  Site patent and intact, free from redness, edema or discomfort.  No evidence of extravasations.  Access discontinued per protocol.  Biologic Infusion Post Education: Call the triage nurse at your clinic or seek medical attention if you have chills and/or temperature greater than or equal to 100.5, uncontrolled nausea/vomiting, diarrhea, constipation, dizziness, shortness of breath, chest pain, heart palpitations, weakness or any other new or concerning symptoms, questions or concerns.  You cannot have any live virus vaccines prior to or during treatment or up to 6 months post infusion.  If you have an upcoming surgery, medical procedure or dental procedure during treatment, this should be discussed with your ordering physician and your surgeon/dentist.  If you are having any concerning symptom, if you are unsure if you should get your next infusion or wish to speak to a provider before your next infusion, please call your care coordinator or triage nurse at your clinic to  notify them so we can adequately serve you.       Discharge Plan:   Discharge instructions reviewed with: Patient.  Patient and/or family verbalized understanding of discharge instructions and all questions answered.  AVS to patient via [employer].  Patient will return for next appointment per MD.  Pt to start home infusion of Remicade starting 09/27 per care coordination.   Patient discharged in stable condition accompanied by: self.  Departure Mode: Ambulatory.    Administrations This Visit       inFLIXimab (REMICADE) 300 mg in sodium chloride 0.9 % 275 mL infusion       Admin Date  08/02/2023 Action  $New Bag Dose  300 mg Rate  137.5 mL/hr Route  Intravenous Administered By  Kailey Valenzuela, RN                        Kailey Valenzuela RN

## 2023-08-02 NOTE — PATIENT INSTRUCTIONS
EDUCATION POST BIOLOGICAL/CHEMOTHERAPY INFUSION  Call the triage nurse at your clinic or seek medical attention if you have chills and/or temperature greater than or equal to 100.5, uncontrolled nausea/vomiting, diarrhea, constipation, dizziness, shortness of breath, chest pain, heart palpitations, weakness or any other new or concerning symptoms, questions or concerns.  You can not have any live virus vaccines prior to or during treatment or up to 6 months post infusion.  If you have an upcoming surgery, medical procedure or dental procedure during treatment, this should be discussed with your ordering physician and your surgeon/dentist.  If you are having any concerning symptom, if you are unsure if you should get your next infusion or wish to speak to a provider before your next infusion, please call your care coordinator or triage nurse at your clinic to notify them so we can adequately serve you.  Dear Karlos Terrazas    Thank you for choosing [facility] ([facility]) for your infusion.  The following information is a summary of our appointment as well as important reminders.        We look forward in seeing you on your next appointment here at [facility] ([facility]).  Please don t hesitate to call us at [phone] to reschedule any of your appointments or to speak with one of the [facility] registered nurses.  It was a pleasure taking care of you today.    Sincerely,    [facility]  [address] [place] [address]  [id number]  Phone:  [phone]

## 2023-08-03 ENCOUNTER — MEDICAL CORRESPONDENCE (OUTPATIENT)
Dept: HEALTH INFORMATION MANAGEMENT | Facility: CLINIC | Age: 38
End: 2023-08-03

## 2023-08-09 ASSESSMENT — ENCOUNTER SYMPTOMS
CHILLS: 0
POLYDIPSIA: 0
JOINT SWELLING: 0
NIGHT SWEATS: 0
FEVER: 0
DECREASED APPETITE: 0
MUSCLE WEAKNESS: 1
BOWEL INCONTINENCE: 0
VOMITING: 0
MUSCLE CRAMPS: 0
WEIGHT LOSS: 0
STIFFNESS: 1
ARTHRALGIAS: 1
BRUISES/BLEEDS EASILY: 0
HALLUCINATIONS: 0
NECK PAIN: 0
NAUSEA: 0
BACK PAIN: 0
FATIGUE: 1
POLYPHAGIA: 0
HEARTBURN: 0
MYALGIAS: 0
ABDOMINAL PAIN: 1
CONSTIPATION: 0
BLOATING: 1
RECTAL PAIN: 0
JAUNDICE: 0
BLOOD IN STOOL: 0
WEIGHT GAIN: 0
INCREASED ENERGY: 0
ALTERED TEMPERATURE REGULATION: 0
SWOLLEN GLANDS: 0
DIARRHEA: 1

## 2023-08-10 ENCOUNTER — PATIENT OUTREACH (OUTPATIENT)
Dept: GASTROENTEROLOGY | Facility: CLINIC | Age: 38
End: 2023-08-10

## 2023-08-10 ENCOUNTER — VIRTUAL VISIT (OUTPATIENT)
Dept: GASTROENTEROLOGY | Facility: CLINIC | Age: 38
End: 2023-08-10
Attending: INTERNAL MEDICINE
Payer: COMMERCIAL

## 2023-08-10 DIAGNOSIS — K50.018 CROHN'S DISEASE OF ILEUM WITH OTHER COMPLICATION (H): Primary | ICD-10-CM

## 2023-08-10 DIAGNOSIS — D50.9 ANEMIA, IRON DEFICIENCY: Primary | ICD-10-CM

## 2023-08-10 PROCEDURE — 99214 OFFICE O/P EST MOD 30 MIN: CPT | Mod: VID | Performed by: PHYSICIAN ASSISTANT

## 2023-08-10 RX ORDER — DIPHENHYDRAMINE HYDROCHLORIDE 50 MG/ML
50 INJECTION INTRAMUSCULAR; INTRAVENOUS
Start: 2023-08-10

## 2023-08-10 RX ORDER — ALBUTEROL SULFATE 90 UG/1
1-2 AEROSOL, METERED RESPIRATORY (INHALATION)
Start: 2023-08-10

## 2023-08-10 RX ORDER — ALBUTEROL SULFATE 0.83 MG/ML
2.5 SOLUTION RESPIRATORY (INHALATION)
OUTPATIENT
Start: 2023-08-10

## 2023-08-10 RX ORDER — EPINEPHRINE 1 MG/ML
0.3 INJECTION, SOLUTION, CONCENTRATE INTRAVENOUS EVERY 5 MIN PRN
OUTPATIENT
Start: 2023-08-10

## 2023-08-10 RX ORDER — MEPERIDINE HYDROCHLORIDE 25 MG/ML
25 INJECTION INTRAMUSCULAR; INTRAVENOUS; SUBCUTANEOUS EVERY 30 MIN PRN
OUTPATIENT
Start: 2023-08-10

## 2023-08-10 RX ORDER — METHYLPREDNISOLONE SODIUM SUCCINATE 125 MG/2ML
125 INJECTION, POWDER, LYOPHILIZED, FOR SOLUTION INTRAMUSCULAR; INTRAVENOUS
Start: 2023-08-10

## 2023-08-10 ASSESSMENT — PAIN SCALES - GENERAL: PAINLEVEL: NO PAIN (0)

## 2023-08-10 NOTE — LETTER
8/10/2023         RE: Karlos Terrazas  [address] [place] [place] [address]        Dear Colleague,    Thank you for referring your patient, Karlos Terrazas, to the [facility] [place]. Please see a copy of my visit note below.    IBD CLINIC VISIT    CC/REFERRING MD:  No ref. provider found    REASON FOR CONSULTATION: establish care newly diagnosed Crohn's ileitis and colitis    ASSESSMENT/PLAN:    1. Moderate to severe Crohn's ileitis (multifocal with stricturing) and colitis (histologically only).   Current medication: infliximab 5mg/kg every 8 weeks  Current clinical disease activity: HBI 0   Last endoscopic disease activity: colonoscopy 4/2023 The IC valve is open; however, advancing the scope beyond ~ 3 cm into the terminal ileum was not possible due to either stricture or extrinsic compression.  Biopsies show active ileitis with granulomas and patchy active colitis on random colonic biopsies.  MRE 5/2023 active small bowel inflammation with stricturing.    He has been able to successfully achieve symptomatic remission after starting infliximab therapy.  No obstructive symptoms.  Some residual dull ache in the right lower quadrant but this is greatly improving.  He also has some lingering fatigue and we will check his vitamin D and iron studies and proceed with iron infusions if necessary.  I think in terms of reassessment after initiating IBD therapy, it may be most fruitful to repeat an MR enterography after 6 months of stable therapy.  An infliximab level is planned to be obtained after his next infusion which would be around the 14-week adrián of therapy.  We will adjust his dose as necessary.  Should he require dose adjustment, I would recommend an MR enterography 6 months from that dose adjustment.    --Continue infliximab every 8 weeks  -- Infliximab level to be obtained next infusion  --Labs to include CBC, LFTs, CRP, ESR every 3 months.  -- Labs now to include  iron studies and vitamin D    2. Hypokalemia - recheck and supplement if needed    3. Anemia - likely related to CD. Treat as above. Check Fe/B12 and folate    IBD HISTORY  Age at diagnosis: 37 (2023)  Extent of disease: mainly small intestine but histologic colon as well on random biopsies  Disease phenotype: inflammatory/stricturing  Lakshmi-anal disease: none  Current CD medications: none  Prior IBD surgeries: none   Prior IBD Medications: none    DRUG MONITORING  TPMT enzyme activity: pending    6-TGN/6-MMPN levels: NA    Biologic concentration: NA    DISEASE ASSESSMENT  Labs  Recent Labs   Lab Test 08/02/23  1212 07/06/23  1214 05/10/23  1601 05/03/23  1012   CRP  --   --   --  1.3*   SED 11 8   < > 16*    < > = values in this interval not displayed.     Fecal calprotectin: not checked  Endoscopy:   -colonoscopy 4/2022 - strictured ileum 3 cm prox to IC valve - biopsies show active ileitis with granulomas. Mainly chronic changes seen at IC valve/distal ileum on colon. Colon appeared normal but had patchy colitis with granulomas  Enterography:   -MRE 5/3/23:  ENTEROGRAPHY: No penetrating disease. The colon and stomach are unremarkable.     1. An approximately 20 cm segment of small bowel at the terminal ileum with moderate wall thickening, luminal narrowing and stratified mural hyperenhancement is consistent with active inflammation, images 5-15, series 3. No stricturing.     2. A 7.3 cm segment in the small bowel in the midabdomen with mild wall thickening, luminal narrowing and stratified mural hyperenhancement is consistent with active inflammation, image 7:3. Stricturing present with the proximal small bowel measuring 4.9   cm, image 6:3.     3. A 6.8 cm segment in the small bowel in the mid right abdomen with mild wall thickening, luminal narrowing and stratified mural hyperenhancement is consistent with active inflammation, image 9:3. Stricturing present with the proximal small bowel   measuring 3.7 cm, image  6:3.  C diff: not checked (no diarrhea)    sIBDQ:   IBDQ Score Date IBDQ - Total Score  (Higher score better)   8/9/2023   8:26 AM 55   5/5/2023  11:59 AM 49       IBD Health Care Maintenance:    Vaccinations:  All patients on biologics should avoid live vaccines.    -- Influenza (every year)  -- TdaP (every 10 years)  -- Pneumococcal Pneumonia (once plus booster at 5 years)  -- Yearly assessment for latent Tb (verbal screening and exam, PPD or QuantiFERON-Tb testing)    One time confirmation of immunity or serologies:  -- Hepatitis A (serologies or immunizations)  -- Hepatitis B (serologies or immunizations)  -- Varicella  -- MMR  -- HPV (all aged 18-26)  -- Meningococcal meningitis (all patients at risk for meningitis)  -- Due to the immunosuppression in this patient, I would not advise administration of live vaccines such as varicella/VZV, intranasal influenza, MMR, or yellow fever vaccine (if travelling).      Bone mineral density screening   -- Recommend all patients supplement with calcium and vitamin D  -- Given no prior steroid use recommend DEXA not needed at this time    Cancer Screening:  Colon cancer screening:  Given colonic involvement, recommend patient undergo regular dysplasia surveillance   Next dysplasia screening is recommended 2031.    Skin cancer screening: Annual visual exam of skin by dermatologist since patient is immunocompromised    Depression Screening:  -- Over the last month, have you felt down, depressed, or hopeless? no  -- Over the last month, have you felt little interest or pleasure doing things? no    Misc:  -- Avoid tobacco use  -- Avoid NSAIDs as there is potentially a 25% chance of causing an IBD flare    Return to clinic in 3 months     Thank you for this consultation.  It was a pleasure to participate in the care of this patient; please contact us with any further questions.      Willie Lowe PA-C  Division of Gastroenterology, Hepatology and Nutrition  [facility]  [place]       HPI:   First infusion of infliximab was 6/20/23. Enrolled in the optimize study.    Has felt much better over the last few weeks. Currently having 1 formed stools per day. On occasion 1-2 times per week might have a looser bowel movement. Notes a residual right lower quadrant ache that is much improved since being on remicade. No blood in the stool (not really a presenting symptom for him). Next infusion is 9/27/23.     Last [facility]. Leo [facility] 5/10/23. Additional details below:    Karlos notes that he has had intermittent GI symptoms for several years.  However, he noticed things worsened more substantially in November 2022.  At that time he noticed increased generalized abdominal discomfort and irregular bowel habits.  He noticed during this time period that his abdomen was quite tender to the touch.  If his children were to bump into his abdomen he would have substantial discomfort.  He eventually saw his primary care doctor in January of this year.  His primary care doctor did order a CT scan.  The initial read of the CT scan was concerning for appendicitis.  Interestingly, by the time the CT scan was done he reports that his abdominal discomfort had significantly improved.  However because of the ominous findings on the CT scan he did go to the emergency room.  He was seen by general surgery and taken to the operating room.  However, once they entered the abdomen they found there was significant evidence of inflammation and bleeding.  The majority of inflammation was noted in the small bowel and the cecum.  The appendix was hard to access and nonmobile with lots of adhesive tissue.  However, it was noted itself to be healthy and not perforated.  Hemostasis was achieved and no resection was performed.  There was concern for Crohn's disease and the patient was referred for colonoscopy.      Dr. Seaman performed a colonoscopy on 4/7/2023.  The IC valve was open but the scope could not  be advanced beyond 3 cm into the terminal ileum due to stricturing.  No apathy or ulcerations were seen.  Biopsies were taken of the visualized terminal ileum.  The colonic mucosa was essentially unremarkable though rare punctate red lesions with whitish center were noticed in the left colon.  Biopsies were done.  The area of the appendix was edematous.  Biopsies of both the ileum and the colon showed active changes with granulomas.  This was felt to be consistent with Crohn's disease in the correct clinical setting.      An MR enterography was performed and showed multiple areas of small bowel with moderate wall thickening and luminal narrowing.  Changes were also consistent with active inflammation.  There was a 20 cm segment in the terminal ileum of involvement with no stricturing.  There was another 7.3 cm segment in the mid abdominal region with some stricturing.  There was another 6.8 cm segment in the right mid abdomen with some stricturing present.    Laboratory studies have been obtained which do show evidence of inflammation with CRP of 11.  Mild anemia at 11.3.  Mild thrombocytosis at 470.  Normal white blood cell count.  Vitamin B12 and folate are normal.  Ferritin low at 7.    He denies any family history of Crohn's disease or ulcerative colitis.  No history of colon cancer or colon polyps.      ROS:    No fevers or chills  No weight loss  No blurry vision, double vision or change in vision  No sore throat  No lymphadenopathy  No headache, paraesthesias, or weakness in a limb  No shortness of breath or wheezing  No chest pain or pressure  No arthralgias or myalgias  No rashes or skin changes  No odynophagia or dysphagia  No BRBPR, hematochezia, melena  No dysuria, frequency or urgency  No hot/cold intolerance or polyria  No anxiety or depression    Extra intestinal manifestations of IBD:  No uveitis/episcleritis  No aphthous ulcers   No arthritis   No erythema nodosum/pyoderma gangrenosum.     PERTINENT  PAST MEDICAL HISTORY:  No past medical history on file.    PREVIOUS SURGERIES:  Past Surgical History:   Procedure Laterality Date    COLONOSCOPY N/A 4/7/2023    Procedure: COLONOSCOPY, WITH BIOPSY;  Surgeon: Kilo Seaman MD;  Location: [facility] OR    LAPAROSCOPIC APPENDECTOMY N/A 2/22/2023    Procedure: DIAGNOSTIC LAPAROSCOPY, LYSIS OF ADHESION, DRAIN PLACEMENT;  Surgeon: Fabiola Miranda MD;  Location: [facility] OR       PREVIOUS ENDOSCOPY:  Results for orders placed or performed during the hospital encounter of 04/07/23   COLONOSCOPY   Result Value Ref Range    COLONOSCOPY       [facility]  [address] [place] [address]., [place] [address] [phone]     Endoscopy Department  _______________________________________________________________________________  Patient Name: Nicholas Terrazas            Procedure Date: 4/7/2023 6:37 AM  MRN: [id number]                       Account Number: [id number]  YOB: 1985              Admit Type: Outpatient  Age: 37                               Room: [facility] PROCEDURE ROOM 02  Gender: Male                          Note Status: Finalized  Attending MD: KILO SEAMAN MD   Total Sedation Time:   _______________________________________________________________________________     Procedure:             Colonoscopy  Indications:           Suspected Crohn's disease of the small bowel and colon  Providers:             KILO SEAMAN MD, Sera Benítez, RN, HAI MARRUFO  Patient Profile:       This is a 37 year old male with recent symptoms of RLQ                          pain and we ight loss over a period of > 1 month. He                          was diagnosed with appendicitis and had surgery;                          however, finding of inflammation in the right colon                          and terminal ileum led to aborted surgery and a                          request for a colonoscopy.  Referring MD:          NICHOLAS ISAAC,  MD  Medicines:             Monitored Anesthesia Care  Complications:         No immediate complications.  _______________________________________________________________________________  Procedure:             Pre-Anesthesia Assessment:                         - Airway Examination: normal oropharyngeal airway and                          neck mobility.                         - Respiratory Examination: clear to auscultation.                         - CV Examination: regular rate and rhythm.                         - ASA Grade Assessment: I - A normal, healthy patient.                         - After reviewing the risks and benefits, the  patient                          was deemed in satisfactory condition to undergo the                          procedure.                         - The anesthesia plan was to use deep                          sedation/analgesia.                         - Immediately prior to administration of medications,                          the patient was re-assessed for adequacy to receive                          sedatives.                         - Sedation was administered by an anesthesia                          professional. Deep sedation was attained.                         - The heart rate, respiratory rate, oxygen                          saturations, blood pressure, adequacy of pulmonary                          ventilation, and response to care were monitored                          throughout the procedure.                         - The physical status of the patient was re-assessed                          after the procedure.                         After obtaining informed cons ent, the colonoscope was                          passed under direct vision. Throughout the procedure,                          the patient's blood pressure, pulse, and oxygen                          saturations were monitored continuously. The                          Colonoscope was introduced  through the anus and                          advanced to the terminal ileum. The colonoscopy was                          performed without difficulty. The patient tolerated                          the procedure well. The quality of the bowel                          preparation was evaluated using the BBPS (Proctor Bowel                          Preparation Scale) with scores of: Right Colon = 3,                          Transverse Colon = 3 and Left Colon = 3 (entire mucosa                          seen well with no residual staining, small fragments                          of stool or opaque liquid). The total BBPS score                          equals 9. The terminal ileum, ileocecal va lve,                          appendiceal orifice, and rectum were photographed.                                                                                   Findings:       No aphthae or ulcerations. The IC valve is open; however, advancing the        scope beyond ~ 3 cm into the terminal ileum was not possible due to        either stricture or extrinsic compression. Biopsies were taken in the        accessible terminal ileum.       Essentially unremarkable mucosa, although rare punctate red lesions with        whitish center were noted in the left colon. Biopsies of the colon        mucosa were done.       The area of the appendix was edematous.                                                                                   Impression:            - The findings are not diagnostic of Crohn's disease                          and could be consistent with recent appendicitis.                          However, examination of the terminal ileum was limited.  Recommendation:        -  Await pathology results.                         - Consider repeat abdominal imaging.                                                                                     Signed Electronically by Lamont Seaman  MD  _____________________  KILO PALMA MD  4/7/2023 10:06:36 AM  I was physically present for the entire viewing portion of the exam.  __________________________  Signature of teaching physician  KILO PALMA MD  Number of Addenda: 0    Note Initiated On: 4/7/2023 6:37 AM  Scope In:  Scope Out:     ]    ALLERGIES:   No Known Allergies    PERTINENT MEDICATIONS:    Current Outpatient Medications:     cetirizine (ZYRTEC) 10 MG tablet, Take 10 mg by mouth daily, Disp: , Rfl:     fluticasone (FLONASE ALLERGY RELIEF) 50 MCG/ACT nasal spray, Spray 1 spray into both nostrils daily, Disp: , Rfl:     SOCIAL HISTORY:  Social History     Socioeconomic History    Marital status:      Spouse name: Not on file    Number of children: Not on file    Years of education: Not on file    Highest education level: Not on file   Occupational History    Not on file   Tobacco Use    Smoking status: Never    Smokeless tobacco: Never   Vaping Use    Vaping Use: Never used   Substance and Sexual Activity    Alcohol use: Yes     Comment: occasional use    Drug use: Yes     Types: Marijuana    Sexual activity: Yes     Partners: Female   Other Topics Concern    Parent/sibling w/ CABG, MI or angioplasty before 65F 55M? No   Social History Narrative    Not on file     Social Determinants of Health     Financial Resource Strain: Not on file   Food Insecurity: Not on file   Transportation Needs: Not on file   Physical Activity: Not on file   Stress: Not on file   Social Connections: Not on file   Intimate Partner Violence: Not on file   Housing Stability: Not on file       FAMILY HISTORY:  No family history on file.    Past/family/social history reviewed and no changes    PHYSICAL EXAMINATION:  Constitutional: aaox3, cooperative, pleasant, not dyspneic/diaphoretic, no acute distress  Vitals reviewed: There were no vitals taken for this visit.  Wt:   Wt Readings from Last 2 Encounters:   08/02/23 66.1 kg (145 lb 11.2 oz)   07/06/23  65.2 kg (143 lb 11.2 oz)      Eyes: Sclera anicteric/injected  Ears/nose/mouth/throat: Normal oropharynx without ulcers or exudate, mucus membranes moist, hearing intact  Neck: supple, thyroid normal size  CV: No edema  Respiratory: Unlabored breathing  Lymph: No axillary, submandibular, supraclavicular or inguinal lymphadenopathy  Abd:  Nondistended, +bs, no hepatosplenomegaly, nontender, no peritoneal signs  Skin: warm, perfused, no jaundice  Psych: Normal affect  MSK: Normal gait      PERTINENT STUDIES:  Most recent CBC:  Recent Labs   Lab Test 08/02/23  1212 07/06/23  1214   WBC 5.4 7.5   HGB 11.1* 11.7*   HCT 36.4* 37.7*    487*     Most recent hepatic panel:  Recent Labs   Lab Test 08/02/23  1212 07/06/23  1214   ALT 12 11   AST 20 18     Most recent creatinine:  Recent Labs   Lab Test 05/10/23  1601 05/03/23  1012   CR 0.90 0.74           Again, thank you for allowing me to participate in the care of your patient.      Sincerely,    Willie Lowe PA-C

## 2023-08-10 NOTE — PROGRESS NOTES
Virtual Visit Details    Type of service:  Video Visit     Originating Location (pt. Location): Home    Distant Location (provider location):  Off-site  Platform used for Video Visit: Well    IBD CLINIC VISIT    CC/REFERRING MD:  No ref. provider found    REASON FOR CONSULTATION: establish care newly diagnosed Crohn's ileitis and colitis    ASSESSMENT/PLAN:    1. Moderate to severe Crohn's ileitis (multifocal with stricturing) and colitis (histologically only).   Current medication: infliximab 5mg/kg every 8 weeks  Current clinical disease activity: HBI 0   Last endoscopic disease activity: colonoscopy 4/2023 The IC valve is open; however, advancing the scope beyond ~ 3 cm into the terminal ileum was not possible due to either stricture or extrinsic compression.  Biopsies show active ileitis with granulomas and patchy active colitis on random colonic biopsies.  MRE 5/2023 active small bowel inflammation with stricturing.    He has been able to successfully achieve symptomatic remission after starting infliximab therapy.  No obstructive symptoms.  Some residual dull ache in the right lower quadrant but this is greatly improving.  He also has some lingering fatigue and we will check his vitamin D and iron studies and proceed with iron infusions if necessary.  I think in terms of reassessment after initiating IBD therapy, it may be most fruitful to repeat an MR enterography after 6 months of stable therapy.  An infliximab level is planned to be obtained after his next infusion which would be around the 14-week adrián of therapy.  We will adjust his dose as necessary.  Should he require dose adjustment, I would recommend an MR enterography 6 months from that dose adjustment.    --Continue infliximab every 8 weeks  -- Infliximab level to be obtained next infusion  --Labs to include CBC, LFTs, CRP, ESR every 3 months.  -- Labs now to include iron studies and vitamin D    2. Hypokalemia - recheck and supplement if  needed    3. Anemia - likely related to CD. Treat as above. Check Fe/B12 and folate    IBD HISTORY  Age at diagnosis: 37 (2023)  Extent of disease: mainly small intestine but histologic colon as well on random biopsies  Disease phenotype: inflammatory/stricturing  Lakshmi-anal disease: none  Current CD medications: none  Prior IBD surgeries: none   Prior IBD Medications: none    DRUG MONITORING  TPMT enzyme activity: pending    6-TGN/6-MMPN levels: NA    Biologic concentration: NA    DISEASE ASSESSMENT  Labs  Recent Labs   Lab Test 08/02/23  1212 07/06/23  1214 05/10/23  1601 05/03/23  1012   CRP  --   --   --  1.3*   SED 11 8   < > 16*    < > = values in this interval not displayed.     Fecal calprotectin: not checked  Endoscopy:   -colonoscopy 4/2022 - strictured ileum 3 cm prox to IC valve - biopsies show active ileitis with granulomas. Mainly chronic changes seen at IC valve/distal ileum on colon. Colon appeared normal but had patchy colitis with granulomas  Enterography:   -MRE 5/3/23:  ENTEROGRAPHY: No penetrating disease. The colon and stomach are unremarkable.     1. An approximately 20 cm segment of small bowel at the terminal ileum with moderate wall thickening, luminal narrowing and stratified mural hyperenhancement is consistent with active inflammation, images 5-15, series 3. No stricturing.     2. A 7.3 cm segment in the small bowel in the midabdomen with mild wall thickening, luminal narrowing and stratified mural hyperenhancement is consistent with active inflammation, image 7:3. Stricturing present with the proximal small bowel measuring 4.9   cm, image 6:3.     3. A 6.8 cm segment in the small bowel in the mid right abdomen with mild wall thickening, luminal narrowing and stratified mural hyperenhancement is consistent with active inflammation, image 9:3. Stricturing present with the proximal small bowel   measuring 3.7 cm, image 6:3.  C diff: not checked (no diarrhea)    sIBDQ:   IBDQ Score Date IBDQ  - Total Score  (Higher score better)   8/9/2023   8:26 AM 55   5/5/2023  11:59 AM 49       IBD Health Care Maintenance:    Vaccinations:  All patients on biologics should avoid live vaccines.    -- Influenza (every year)  -- TdaP (every 10 years)  -- Pneumococcal Pneumonia (once plus booster at 5 years)  -- Yearly assessment for latent Tb (verbal screening and exam, PPD or QuantiFERON-Tb testing)    One time confirmation of immunity or serologies:  -- Hepatitis A (serologies or immunizations)  -- Hepatitis B (serologies or immunizations)  -- Varicella  -- MMR  -- HPV (all aged 18-26)  -- Meningococcal meningitis (all patients at risk for meningitis)  -- Due to the immunosuppression in this patient, I would not advise administration of live vaccines such as varicella/VZV, intranasal influenza, MMR, or yellow fever vaccine (if travelling).      Bone mineral density screening   -- Recommend all patients supplement with calcium and vitamin D  -- Given no prior steroid use recommend DEXA not needed at this time    Cancer Screening:  Colon cancer screening:  Given colonic involvement, recommend patient undergo regular dysplasia surveillance   Next dysplasia screening is recommended 2031.    Skin cancer screening: Annual visual exam of skin by dermatologist since patient is immunocompromised    Depression Screening:  -- Over the last month, have you felt down, depressed, or hopeless? no  -- Over the last month, have you felt little interest or pleasure doing things? no    Misc:  -- Avoid tobacco use  -- Avoid NSAIDs as there is potentially a 25% chance of causing an IBD flare    Return to clinic in 3 months     Thank you for this consultation.  It was a pleasure to participate in the care of this patient; please contact us with any further questions.      Willie Lowe PA-C  Division of Gastroenterology, Hepatology and Nutrition  [facility]       HPI:   First infusion of infliximab was 6/20/23. Enrolled in  the optimize study.    Has felt much better over the last few weeks. Currently having 1 formed stools per day. On occasion 1-2 times per week might have a looser bowel movement. Notes a residual right lower quadrant ache that is much improved since being on remicade. No blood in the stool (not really a presenting symptom for him). Next infusion is 9/27/23.     Last saw Dr. Leo to [facility] 5/10/23. Additional details below:    Karlos notes that he has had intermittent GI symptoms for several years.  However, he noticed things worsened more substantially in November 2022.  At that time he noticed increased generalized abdominal discomfort and irregular bowel habits.  He noticed during this time period that his abdomen was quite tender to the touch.  If his children were to bump into his abdomen he would have substantial discomfort.  He eventually saw his primary care doctor in January of this year.  His primary care doctor did order a CT scan.  The initial read of the CT scan was concerning for appendicitis.  Interestingly, by the time the CT scan was done he reports that his abdominal discomfort had significantly improved.  However because of the ominous findings on the CT scan he did go to the emergency room.  He was seen by general surgery and taken to the operating room.  However, once they entered the abdomen they found there was significant evidence of inflammation and bleeding.  The majority of inflammation was noted in the small bowel and the cecum.  The appendix was hard to access and nonmobile with lots of adhesive tissue.  However, it was noted itself to be healthy and not perforated.  Hemostasis was achieved and no resection was performed.  There was concern for Crohn's disease and the patient was referred for colonoscopy.      Dr. Seaman performed a colonoscopy on 4/7/2023.  The IC valve was open but the scope could not be advanced beyond 3 cm into the terminal ileum due to stricturing.  No  apathy or ulcerations were seen.  Biopsies were taken of the visualized terminal ileum.  The colonic mucosa was essentially unremarkable though rare punctate red lesions with whitish center were noticed in the left colon.  Biopsies were done.  The area of the appendix was edematous.  Biopsies of both the ileum and the colon showed active changes with granulomas.  This was felt to be consistent with Crohn's disease in the correct clinical setting.      An MR enterography was performed and showed multiple areas of small bowel with moderate wall thickening and luminal narrowing.  Changes were also consistent with active inflammation.  There was a 20 cm segment in the terminal ileum of involvement with no stricturing.  There was another 7.3 cm segment in the mid abdominal region with some stricturing.  There was another 6.8 cm segment in the right mid abdomen with some stricturing present.    Laboratory studies have been obtained which do show evidence of inflammation with CRP of 11.  Mild anemia at 11.3.  Mild thrombocytosis at 470.  Normal white blood cell count.  Vitamin B12 and folate are normal.  Ferritin low at 7.    He denies any family history of Crohn's disease or ulcerative colitis.  No history of colon cancer or colon polyps.      ROS:    No fevers or chills  No weight loss  No blurry vision, double vision or change in vision  No sore throat  No lymphadenopathy  No headache, paraesthesias, or weakness in a limb  No shortness of breath or wheezing  No chest pain or pressure  No arthralgias or myalgias  No rashes or skin changes  No odynophagia or dysphagia  No BRBPR, hematochezia, melena  No dysuria, frequency or urgency  No hot/cold intolerance or polyria  No anxiety or depression    Extra intestinal manifestations of IBD:  No uveitis/episcleritis  No aphthous ulcers   No arthritis   No erythema nodosum/pyoderma gangrenosum.     PERTINENT PAST MEDICAL HISTORY:  No past medical history on file.    PREVIOUS  SURGERIES:  Past Surgical History:   Procedure Laterality Date    COLONOSCOPY N/A 4/7/2023    Procedure: COLONOSCOPY, WITH BIOPSY;  Surgeon: Kilo Seaman MD;  Location: [facility] OR    LAPAROSCOPIC APPENDECTOMY N/A 2/22/2023    Procedure: DIAGNOSTIC LAPAROSCOPY, LYSIS OF ADHESION, DRAIN PLACEMENT;  Surgeon: Fabiola Miranda MD;  Location: [facility] OR       PREVIOUS ENDOSCOPY:  Results for orders placed or performed during the hospital encounter of 04/07/23   COLONOSCOPY   Result Value Ref Range    COLONOSCOPY       [facility]  [address] [place], [place] [address]., [place] [address] [phone]     Endoscopy Department  _______________________________________________________________________________  Patient Name: Nicholas Terrazas            Procedure Date: 4/7/2023 6:37 AM  MRN: [id number]                       Account Number: [id number]  YOB: 1985              Admit Type: Outpatient  Age: 37                               [facility]  Gender: Male                          Note Status: Finalized  Attending MD: KILO SEAMAN MD   Total Sedation Time:   _______________________________________________________________________________     Procedure:             Colonoscopy  Indications:           Suspected Crohn's disease of the small bowel and colon  Providers:             KILO SEAMAN MD, Sera Benítez, RN, HAI MARRUFO  Patient Profile:       This is a 37 year old male with recent symptoms of RLQ                          pain and we ight loss over a period of > 1 month. He                          was diagnosed with appendicitis and had surgery;                          however, finding of inflammation in the right colon                          and terminal ileum led to aborted surgery and a                          request for a colonoscopy.  Referring MD:          NICHOLAS ISAAC MD  Medicines:             Monitored Anesthesia Care  Complications:          No immediate complications.  _______________________________________________________________________________  Procedure:             Pre-Anesthesia Assessment:                         - Airway Examination: normal oropharyngeal airway and                          neck mobility.                         - Respiratory Examination: clear to auscultation.                         - CV Examination: regular rate and rhythm.                         - ASA Grade Assessment: I - A normal, healthy patient.                         - After reviewing the risks and benefits, the  patient                          was deemed in satisfactory condition to undergo the                          procedure.                         - The anesthesia plan was to use deep                          sedation/analgesia.                         - Immediately prior to administration of medications,                          the patient was re-assessed for adequacy to receive                          sedatives.                         - Sedation was administered by an anesthesia                          professional. Deep sedation was attained.                         - The heart rate, respiratory rate, oxygen                          saturations, blood pressure, adequacy of pulmonary                          ventilation, and response to care were monitored                          throughout the procedure.                         - The physical status of the patient was re-assessed                          after the procedure.                         After obtaining informed cons ent, the colonoscope was                          passed under direct vision. Throughout the procedure,                          the patient's blood pressure, pulse, and oxygen                          saturations were monitored continuously. The                          Colonoscope was introduced through the anus and                          advanced to the terminal ileum. The  colonoscopy was                          performed without difficulty. The patient tolerated                          the procedure well. The quality of the bowel                          preparation was evaluated using the BBPS ([place] Bowel                          Preparation Scale) with scores of: Right Colon = 3,                          Transverse Colon = 3 and Left Colon = 3 (entire mucosa                          seen well with no residual staining, small fragments                          of stool or opaque liquid). The total BBPS score                          equals 9. The terminal ileum, ileocecal va lve,                          appendiceal orifice, and rectum were photographed.                                                                                   Findings:       No aphthae or ulcerations. The IC valve is open; however, advancing the        scope beyond ~ 3 cm into the terminal ileum was not possible due to        either stricture or extrinsic compression. Biopsies were taken in the        accessible terminal ileum.       Essentially unremarkable mucosa, although rare punctate red lesions with        whitish center were noted in the left colon. Biopsies of the colon        mucosa were done.       The area of the appendix was edematous.                                                                                   Impression:            - The findings are not diagnostic of Crohn's disease                          and could be consistent with recent appendicitis.                          However, examination of the terminal ileum was limited.  Recommendation:        -  Await pathology results.                         - Consider repeat abdominal imaging.                                                                                     Signed Electronically by Lamont Palma MD  _____________________  LAMONT PALMA MD  4/7/2023 10:06:36 AM  I was physically present for the entire  viewing portion of the exam.  __________________________  Signature of teaching physician  KILO PALMA MD  Number of Addenda: 0    Note Initiated On: 4/7/2023 6:37 AM  Scope In:  Scope Out:     ]    ALLERGIES:   No Known Allergies    PERTINENT MEDICATIONS:    Current Outpatient Medications:     cetirizine (ZYRTEC) 10 MG tablet, Take 10 mg by mouth daily, Disp: , Rfl:     fluticasone (FLONASE ALLERGY RELIEF) 50 MCG/ACT nasal spray, Spray 1 spray into both nostrils daily, Disp: , Rfl:     SOCIAL HISTORY:  Social History     Socioeconomic History    Marital status:      Spouse name: Not on file    Number of children: Not on file    Years of education: Not on file    Highest education level: Not on file   Occupational History    Not on file   Tobacco Use    Smoking status: Never    Smokeless tobacco: Never   Vaping Use    Vaping Use: Never used   Substance and Sexual Activity    Alcohol use: Yes     Comment: occasional use    Drug use: Yes     Types: Marijuana    Sexual activity: Yes     Partners: Female   Other Topics Concern    Parent/sibling w/ CABG, MI or angioplasty before 65F 55M? No   Social History Narrative    Not on file     Social Determinants of Health     Financial Resource Strain: Not on file   Food Insecurity: Not on file   Transportation Needs: Not on file   Physical Activity: Not on file   Stress: Not on file   Social Connections: Not on file   Intimate Partner Violence: Not on file   Housing Stability: Not on file       FAMILY HISTORY:  No family history on file.    Past/family/social history reviewed and no changes    PHYSICAL EXAMINATION:  Constitutional: aaox3, cooperative, pleasant, not dyspneic/diaphoretic, no acute distress  Vitals reviewed: There were no vitals taken for this visit.  Wt:   Wt Readings from Last 2 Encounters:   08/02/23 66.1 kg (145 lb 11.2 oz)   07/06/23 65.2 kg (143 lb 11.2 oz)      Eyes: Sclera anicteric/injected  Ears/nose/mouth/throat: Normal oropharynx  without ulcers or exudate, mucus membranes moist, hearing intact  Neck: supple, thyroid normal size  CV: No edema  Respiratory: Unlabored breathing  Lymph: No axillary, submandibular, supraclavicular or inguinal lymphadenopathy  Abd:  Nondistended, +bs, no hepatosplenomegaly, nontender, no peritoneal signs  Skin: warm, perfused, no jaundice  Psych: Normal affect  MSK: Normal gait      PERTINENT STUDIES:  Most recent CBC:  Recent Labs   Lab Test 08/02/23  1212 07/06/23  1214   WBC 5.4 7.5   HGB 11.1* 11.7*   HCT 36.4* 37.7*    487*     Most recent hepatic panel:  Recent Labs   Lab Test 08/02/23  1212 07/06/23  1214   ALT 12 11   AST 20 18     Most recent creatinine:  Recent Labs   Lab Test 05/10/23  1601 05/03/23  1012   CR 0.90 0.74

## 2023-08-10 NOTE — PATIENT INSTRUCTIONS
It was a pleasure taking care of you today.  I've included a brief summary of our discussion and care plan from today's visit below.  Please review this information with your primary care provider.  ______________________________________________________________________    My recommendations are summarized as follows:    -- Continue remicade every 8 weeks  -- Labs now and with infusions  -- Next endoscopic assessment: MRE in 6 months on stable regimen  -- Patient with IBD we recommend supplementation vitamin D 1000 units daily and calcium 500 mg twice daily.  -- Vaccines/immunizations to be updated: tetanus, shingles, covid, flu, pneumonia   -- Yearly Dermatology visit for skin check while on immunosuppressive therapy. Can call [phone] to schedule.  -- No NSAIDs (ibuprofen, or anything containing ibuprofen)       For additional resources about inflammatory bowel disease visit [email or web address]     Return to GI Clinic in 3 months to review your progress.    ______________________________________________________________________    How do I schedule labs, imaging studies, or procedures that were ordered in clinic today?     Labs: To schedule lab appointment at the [facility], use my chart or call [phone]. If you have a [facility] lab closer to home where you are regularly seen you can give them a call.     Procedures: If a colonoscopy, upper endoscopy, breath test, esophageal manometry, or pH impedence was ordered today, our endoscopy team will call you to schedule this. If you have not heard from our endoscopy team within a week, please call [phone] to schedule.     Imaging Studies: If you were scheduled for a CT scan, X-ray, MRI, ultrasound, HIDA scan or other imaging study, please call [phone] to have this scheduled.     Referral: If a referral to another specialty was ordered, expect a phone call or follow instructions above. If you have not heard from anyone  regarding your referral in a week, please call our clinic to check the status.     Who do I call with any questions after my visit?  Please be in touch if there are any further questions that arise following today's visit.  There are multiple ways to contact your gastroenterology care team.      During business hours, you may reach a Gastroenterology nurse at [phone]    To schedule or reschedule an appointment, please call [phone].     You can always send a secure message through [employer].  [employer] messages are answered by your nurse or doctor typically within 24 hours.  Please allow extra time on weekends and holidays.      For urgent/emergent questions after business hours, you may reach the on-call GI Fellow by contacting the [facility]  at [phone].     How will I get the results of any tests ordered?    You will receive all of your results.  If you have signed up for [employer], any tests ordered at your visit will be available to you after your physician reviews them.  Typically this takes 1-2 weeks.  If there are urgent results that require a change in your care plan, your physician or nurse will call you to discuss the next steps.      What is [employer]?  [employer] is a secure way for you to access all of your healthcare records from the [facility].  It is a web based computer program, so you can sign on to it from any location.  It also allows you to send secure messages to your care team.  I recommend signing up for [employer] access if you have not already done so and are comfortable with using a computer.         Sincerely,    Willie Lowe PA-C  [facility]  Division of Gastroenterology

## 2023-08-10 NOTE — TELEPHONE ENCOUNTER
Per Ms. Lowe on 8/10/23:    Complete iron labs at this time. If noted to be low, iron infusions will be initiated.     Venofer therapy plan ordered. [employer] message sent to infusion finance to initiate prior authorization. Will monitor for iron lab results.

## 2023-08-10 NOTE — NURSING NOTE
Is the patient currently in the state of [place]? YES    Visit mode:VIDEO    If the visit is dropped, the patient can be reconnected by: VIDEO VISIT: Text to cell phone: [phone]    Will anyone else be joining the visit? NO      How would you like to obtain your AVS? MyChart    Are changes needed to the allergy or medication list? NO    Reason for visit: RECHECK    Niki Stringer

## 2023-08-11 NOTE — TELEPHONE ENCOUNTER
RE: Venofer Infusions  Received: Today  Pamela Hensley Samantha, RN; [facility]   Hello,    He is okay to schedule.    Thank you,    Pamela Hensley [facility]  Infusion

## 2023-08-21 ENCOUNTER — MYC MEDICAL ADVICE (OUTPATIENT)
Dept: GASTROENTEROLOGY | Facility: CLINIC | Age: 38
End: 2023-08-21
Payer: COMMERCIAL

## 2023-08-28 ENCOUNTER — MEDICAL CORRESPONDENCE (OUTPATIENT)
Dept: HEALTH INFORMATION MANAGEMENT | Facility: CLINIC | Age: 38
End: 2023-08-28

## 2023-08-28 NOTE — TELEPHONE ENCOUNTER
Discussed with Dee and Dr. Suresh. Patient should be escalated to 10mg/kg for his next dose on 9/27. Order updated. Notified Rona KUMAR RN at [facility] to update his prior authorization.

## 2023-09-01 NOTE — TELEPHONE ENCOUNTER
Received communication from Rona KUMAR RN at [facility]:    PA approved for increased dose at 10mg/kg. Patient is all set to infuse with increased dose on 9/27/23.

## 2023-09-05 ENCOUNTER — DOCUMENTATION ONLY (OUTPATIENT)
Dept: GASTROENTEROLOGY | Facility: CLINIC | Age: 38
End: 2023-09-05
Payer: COMMERCIAL

## 2023-09-05 ENCOUNTER — MEDICAL CORRESPONDENCE (OUTPATIENT)
Dept: HEALTH INFORMATION MANAGEMENT | Facility: CLINIC | Age: 38
End: 2023-09-05

## 2023-09-05 NOTE — TELEPHONE ENCOUNTER
Infliximab level at 14 weeks requested; next infusion scheduled on 9/27/23. Lab order placed, and added into therapy plan. [employer] message sent to Ms. Jiang to request [employer] form be completed. Confirmed with [facility] that level would be drawn.

## 2023-09-11 ENCOUNTER — MEDICAL CORRESPONDENCE (OUTPATIENT)
Dept: HEALTH INFORMATION MANAGEMENT | Facility: CLINIC | Age: 38
End: 2023-09-11
Payer: COMMERCIAL

## 2023-09-26 ENCOUNTER — DOCUMENTATION ONLY (OUTPATIENT)
Dept: GASTROENTEROLOGY | Facility: CLINIC | Age: 38
End: 2023-09-26

## 2023-09-26 ENCOUNTER — LAB (OUTPATIENT)
Dept: LAB | Facility: CLINIC | Age: 38
End: 2023-09-26
Payer: COMMERCIAL

## 2023-09-26 DIAGNOSIS — K50.90 CROHN'S DISEASE (H): ICD-10-CM

## 2023-09-26 DIAGNOSIS — K50.018 CROHN'S DISEASE OF ILEUM WITH OTHER COMPLICATION (H): ICD-10-CM

## 2023-09-26 DIAGNOSIS — K50.919 CROHN'S DISEASE WITH COMPLICATION, UNSPECIFIED GASTROINTESTINAL TRACT LOCATION (H): ICD-10-CM

## 2023-09-26 LAB
ALBUMIN SERPL BCG-MCNC: 4.6 G/DL (ref 3.5–5.2)
ALP SERPL-CCNC: 97 U/L (ref 40–129)
ALT SERPL W P-5'-P-CCNC: 8 U/L (ref 0–70)
AST SERPL W P-5'-P-CCNC: 17 U/L (ref 0–45)
BASOPHILS # BLD AUTO: 0.1 10E3/UL (ref 0–0.2)
BASOPHILS NFR BLD AUTO: 1 %
BILIRUB DIRECT SERPL-MCNC: <0.2 MG/DL (ref 0–0.3)
BILIRUB SERPL-MCNC: 0.3 MG/DL
CRP SERPL-MCNC: <3 MG/L
DEPRECATED CALCIDIOL+CALCIFEROL SERPL-MC: 42 UG/L (ref 20–75)
EOSINOPHIL # BLD AUTO: 0.1 10E3/UL (ref 0–0.7)
EOSINOPHIL NFR BLD AUTO: 3 %
ERYTHROCYTE [DISTWIDTH] IN BLOOD BY AUTOMATED COUNT: 16.5 % (ref 10–15)
ERYTHROCYTE [SEDIMENTATION RATE] IN BLOOD BY WESTERGREN METHOD: 10 MM/HR (ref 0–15)
FERRITIN SERPL-MCNC: 7 NG/ML (ref 31–409)
HCT VFR BLD AUTO: 38.4 % (ref 40–53)
HCV AB SERPL QL IA: NONREACTIVE
HGB BLD-MCNC: 12.2 G/DL (ref 13.3–17.7)
IMM GRANULOCYTES # BLD: 0 10E3/UL
IMM GRANULOCYTES NFR BLD: 0 %
IRON BINDING CAPACITY (ROCHE): 367 UG/DL (ref 240–430)
IRON SATN MFR SERPL: 8 % (ref 15–46)
IRON SERPL-MCNC: 30 UG/DL (ref 61–157)
LYMPHOCYTES # BLD AUTO: 2 10E3/UL (ref 0.8–5.3)
LYMPHOCYTES NFR BLD AUTO: 37 %
MCH RBC QN AUTO: 24.4 PG (ref 26.5–33)
MCHC RBC AUTO-ENTMCNC: 31.8 G/DL (ref 31.5–36.5)
MCV RBC AUTO: 77 FL (ref 78–100)
MONOCYTES # BLD AUTO: 0.5 10E3/UL (ref 0–1.3)
MONOCYTES NFR BLD AUTO: 9 %
NEUTROPHILS # BLD AUTO: 2.7 10E3/UL (ref 1.6–8.3)
NEUTROPHILS NFR BLD AUTO: 50 %
NRBC # BLD AUTO: 0 10E3/UL
NRBC BLD AUTO-RTO: 0 /100
PLATELET # BLD AUTO: 337 10E3/UL (ref 150–450)
PROT SERPL-MCNC: 7.3 G/DL (ref 6.4–8.3)
RBC # BLD AUTO: 4.99 10E6/UL (ref 4.4–5.9)
WBC # BLD AUTO: 5.5 10E3/UL (ref 4–11)

## 2023-09-26 PROCEDURE — 86140 C-REACTIVE PROTEIN: CPT | Performed by: PATHOLOGY

## 2023-09-26 PROCEDURE — 83540 ASSAY OF IRON: CPT | Performed by: PATHOLOGY

## 2023-09-26 PROCEDURE — 36415 COLL VENOUS BLD VENIPUNCTURE: CPT | Performed by: PATHOLOGY

## 2023-09-26 PROCEDURE — 99000 SPECIMEN HANDLING OFFICE-LAB: CPT | Performed by: PATHOLOGY

## 2023-09-26 PROCEDURE — 85025 COMPLETE CBC W/AUTO DIFF WBC: CPT | Performed by: PATHOLOGY

## 2023-09-26 PROCEDURE — 82306 VITAMIN D 25 HYDROXY: CPT | Performed by: INTERNAL MEDICINE

## 2023-09-26 PROCEDURE — 85652 RBC SED RATE AUTOMATED: CPT | Performed by: PATHOLOGY

## 2023-09-26 PROCEDURE — 86803 HEPATITIS C AB TEST: CPT | Performed by: INTERNAL MEDICINE

## 2023-09-26 PROCEDURE — 80076 HEPATIC FUNCTION PANEL: CPT | Performed by: PATHOLOGY

## 2023-09-26 PROCEDURE — 82728 ASSAY OF FERRITIN: CPT | Performed by: PATHOLOGY

## 2023-09-26 PROCEDURE — 83550 IRON BINDING TEST: CPT | Performed by: PATHOLOGY

## 2023-09-26 NOTE — PROGRESS NOTES
Received fax from [facility]. Prescriber orders for 9/5/23, 8/3/23, 8/28/23- 3 orders. Routed to provider/ nurse to review and sign.

## 2023-09-27 ENCOUNTER — LAB REQUISITION (OUTPATIENT)
Dept: LAB | Facility: CLINIC | Age: 38
End: 2023-09-27
Payer: COMMERCIAL

## 2023-09-27 ENCOUNTER — MYC MEDICAL ADVICE (OUTPATIENT)
Dept: GASTROENTEROLOGY | Facility: CLINIC | Age: 38
End: 2023-09-27

## 2023-09-27 ENCOUNTER — DOCUMENTATION ONLY (OUTPATIENT)
Dept: PHARMACY | Facility: CLINIC | Age: 38
End: 2023-09-27

## 2023-09-27 ENCOUNTER — OFFICE VISIT (OUTPATIENT)
Dept: GASTROENTEROLOGY | Facility: CLINIC | Age: 38
End: 2023-09-27
Payer: COMMERCIAL

## 2023-09-27 VITALS
BODY MASS INDEX: 19.91 KG/M2 | HEIGHT: 72 IN | WEIGHT: 147 LBS | OXYGEN SATURATION: 99 % | HEART RATE: 85 BPM | DIASTOLIC BLOOD PRESSURE: 72 MMHG | SYSTOLIC BLOOD PRESSURE: 120 MMHG

## 2023-09-27 DIAGNOSIS — K50.919 CROHN'S DISEASE WITH COMPLICATION, UNSPECIFIED GASTROINTESTINAL TRACT LOCATION (H): ICD-10-CM

## 2023-09-27 DIAGNOSIS — K50.90 CROHN'S DISEASE, UNSPECIFIED, WITHOUT COMPLICATIONS (H): ICD-10-CM

## 2023-09-27 PROCEDURE — 80230 DRUG ASSAY INFLIXIMAB: CPT | Performed by: INTERNAL MEDICINE

## 2023-09-27 PROCEDURE — 99215 OFFICE O/P EST HI 40 MIN: CPT | Performed by: INTERNAL MEDICINE

## 2023-09-27 ASSESSMENT — ENCOUNTER SYMPTOMS
CHILLS: 0
STIFFNESS: 1
BACK PAIN: 0
HALLUCINATIONS: 0
WEIGHT LOSS: 0
FATIGUE: 1
ALTERED TEMPERATURE REGULATION: 0
POLYDIPSIA: 0
MYALGIAS: 0
SWOLLEN GLANDS: 0
POLYPHAGIA: 0
DECREASED APPETITE: 0
ARTHRALGIAS: 1
MUSCLE CRAMPS: 0
FEVER: 0
BRUISES/BLEEDS EASILY: 0
JOINT SWELLING: 0
NIGHT SWEATS: 0
INCREASED ENERGY: 0
WEIGHT GAIN: 0
MUSCLE WEAKNESS: 0
NECK PAIN: 0

## 2023-09-27 ASSESSMENT — PAIN SCALES - GENERAL: PAINLEVEL: NO PAIN (0)

## 2023-09-27 NOTE — PROGRESS NOTES
Type of service: In Person     IBD CLINIC VISIT    CC/REFERRING MD:  No ref. provider found    REASON FOR VISIT: follow-up Crohn's ileitis and colitis    ASSESSMENT/PLAN:    1. Moderate to severe Crohn's ileitis (multifocal with stricturing) and colitis (histologically only).   Current medication: infliximab 5mg/kg every 8 weeks - initiated 6/20/2023   Current clinical disease activity: HBI 0   Last endoscopic disease activity: colonoscopy 4/2023 IC valve is open; however, advancing the scope beyond ~ 3 cm into the terminal ileum was not possible due to either stricture or extrinsic compression.  Biopsies show active ileitis with granulomas and patchy active colitis on random colonic biopsies. MRE 5/2023 active small bowel inflammation with stricturing.    Patient achieved symptomatic remission with initiation of infliximab therapy.  Denies any abdominal pain or obstructive symptoms with 1 formed bowel movement per day. Endorses some lingering fatigue that's exacerbated by exercise, however otherwise doing well with good appetite and weight gain. Labs today without any signs of inflammation CRP < 3 and ESR 10. Will plan to continue infliximab every 8 weeks with repeat labs and MRE imaging in 3 months to assess response to therapy.     Recommendations:   -Check infliximab level prior to infusion today  -Continue infliximab every 8 weeks   -Labs to include CBC, LFTs, CRP, ESR every 3 months   -MRE 6 months after initiation of therapy ~ 12/2023       2. Iron Deficiency Anemia- likely related to CD  Patient endorsing mild fatigue that's exacerbated by exercise. Denies any associated orthostatic changes, chest pain or shortness of breath. Labs consistent with mild iron deficiency anemia with hgb 12.2, mcv 77, ferritin 7, iron 30. No evidence of B12 deficiency 401 on 5/2023. Shared decision making regarding IV iron vs oral iron given symptomatic anemia. Patient agreeable to trying ferrous gluconate with vitamin C  supplementation.     Recommendations  -Initiate ferrous gluconate with vitamin C supplementation     Patient seen and discussed with Dr. Suresh.     Judith Duggan, PGY-1  Internal Medicine  Gastroenterology      40 minutes spent on the date of the encounter performing chart review, history and exam, documentation and further activities as noted above.       IBD HISTORY  Age at diagnosis: 37 (2023)  Extent of disease: mainly small intestine but histologic colon as well on random biopsies  Disease phenotype: inflammatory/stricturing  Lakshmi-anal disease: none  Current CD medications: none  Prior IBD surgeries: none   Prior IBD Medications: none    DRUG MONITORING  TPMT enzyme activity: pending    6-TGN/6-MMPN levels: NA    Biologic concentration: NA    DISEASE ASSESSMENT  Labs  Recent Labs   Lab Test 09/26/23  0832 08/02/23  1212 05/10/23  1601 05/03/23  1012   CRP  --   --   --  1.3*   SED 10 11   < > 16*    < > = values in this interval not displayed.     Fecal calprotectin: not checked    Endoscopy:   -colonoscopy 4/2022 - strictured ileum 3 cm prox to IC valve - biopsies show active ileitis with granulomas. Mainly chronic changes seen at IC valve/distal ileum on colon. Colon appeared normal but had patchy colitis with granulomas  Enterography:   -MRE 5/3/23:  ENTEROGRAPHY: No penetrating disease. The colon and stomach are unremarkable.     1. An approximately 20 cm segment of small bowel at the terminal ileum with moderate wall thickening, luminal narrowing and stratified mural hyperenhancement is consistent with active inflammation, images 5-15, series 3. No stricturing.     2. A 7.3 cm segment in the small bowel in the midabdomen with mild wall thickening, luminal narrowing and stratified mural hyperenhancement is consistent with active inflammation, image 7:3. Stricturing present with the proximal small bowel measuring 4.9   cm, image 6:3.     3. A 6.8 cm segment in the small bowel in the mid right abdomen with  mild wall thickening, luminal narrowing and stratified mural hyperenhancement is consistent with active inflammation, image 9:3. Stricturing present with the proximal small bowel   measuring 3.7 cm, image 6:3.  C diff: not checked (no diarrhea)    sIBDQ:   IBDQ Score Date IBDQ - Total Score  (Higher score better)   8/9/2023   8:26 AM 55   5/5/2023  11:59 AM 49       IBD Health Care Maintenance:    Vaccinations:  All patients on biologics should avoid live vaccines.    -- Influenza (every year) - pending   -- TdaP (every 10 years) - completed   -- Pneumococcal Pneumonia (once plus booster at 5 years) - pending   -- Shingrix - pending   -- Yearly assessment for latent Tb     One time confirmation of immunity or serologies:  -- Hepatitis A (serologies or immunizations)  -- Hepatitis B (serologies or immunizations)  -- Varicella  -- MMR  -- HPV (all aged 18-26)  -- Meningococcal meningitis (all patients at risk for meningitis)  -- Due to the immunosuppression in this patient, I would not advise administration of live vaccines such as varicella/VZV, intranasal influenza, MMR, or yellow fever vaccine (if travelling).      Bone mineral density screening   -- Recommend all patients supplement with calcium and vitamin D  -- Given no prior steroid use recommend DEXA not needed at this time    Cancer Screening:  Colon cancer screening:  Given colonic involvement, recommend patient undergo regular dysplasia surveillance   Next dysplasia screening is recommended 2031.    Skin cancer screening: Annual visual exam of skin by dermatologist since patient is immunocompromised    Depression Screening:  -- Over the last month, have you felt down, depressed, or hopeless? no  -- Over the last month, have you felt little interest or pleasure doing things? no    Misc:  -- Avoid tobacco use  -- Avoid NSAIDs as there is potentially a 25% chance of causing an IBD flare    Judith Duggan, PGY-1  Internal Medicine  Gastroenterology Service      8/10/2023 - Interval History - Virtual Visit   First infusion of infliximab was 6/20/23.     Has felt much better over the last few weeks. Currently having 1 formed stools per day. On occasion 1-2 times per week might have a looser bowel movement. Notes a residual right lower quadrant ache that is much improved since being on remicade. No blood in the stool (not really a presenting symptom for him). Next infusion is 9/27/23.     Last saw Dr. Leo to [facility] care 5/10/23. Additional details below:    Karlos notes that he has had intermittent GI symptoms for several years.  However, he noticed things worsened more substantially in November 2022.  At that time he noticed increased generalized abdominal discomfort and irregular bowel habits.  He noticed during this time period that his abdomen was quite tender to the touch.  If his children were to bump into his abdomen he would have substantial discomfort.  He eventually saw his primary care doctor in January of this year.  His primary care doctor did order a CT scan.  The initial read of the CT scan was concerning for appendicitis.  Interestingly, by the time the CT scan was done he reports that his abdominal discomfort had significantly improved.  However because of the ominous findings on the CT scan he did go to the emergency room.  He was seen by general surgery and taken to the operating room.  However, once they entered the abdomen they found there was significant evidence of inflammation and bleeding.  The majority of inflammation was noted in the small bowel and the cecum.  The appendix was hard to access and nonmobile with lots of adhesive tissue.  However, it was noted itself to be healthy and not perforated.  Hemostasis was achieved and no resection was performed.  There was concern for Crohn's disease and the patient was referred for colonoscopy.      Dr. Seaman performed a colonoscopy on 4/7/2023.  The IC valve was open but the scope could not be  "advanced beyond 3 cm into the terminal ileum due to stricturing.  No apathy or ulcerations were seen.  Biopsies were taken of the visualized terminal ileum.  The colonic mucosa was essentially unremarkable though rare punctate red lesions with whitish center were noticed in the left colon.  Biopsies were done.  The area of the appendix was edematous.  Biopsies of both the ileum and the colon showed active changes with granulomas.  This was felt to be consistent with Crohn's disease in the correct clinical setting.      An MR enterography was performed and showed multiple areas of small bowel with moderate wall thickening and luminal narrowing.  Changes were also consistent with active inflammation.  There was a 20 cm segment in the terminal ileum of involvement with no stricturing.  There was another 7.3 cm segment in the mid abdominal region with some stricturing.  There was another 6.8 cm segment in the right mid abdomen with some stricturing present.    Laboratory studies have been obtained which do show evidence of inflammation with CRP of 11.  Mild anemia at 11.3.  Mild thrombocytosis at 470.  Normal white blood cell count.  Vitamin B12 and folate are normal.  Ferritin low at 7.    He denies any family history of Crohn's disease or ulcerative colitis.  No history of colon cancer or colon polyps.    9/27/2023 - Interval History - In Person Visit   Patient doing well today. Feels the best that he's felt in many months. Denies any abdominal pain or discomfort, however reports intermittent \"twinges\" in the right lower quadrant unrelated to food ingestion. Reports 1 formed stool per day. Denies any looser bowel movements over past month. Reports good appetite and weight has returned to baseline. He does avoid big salads, fruits and red meat. He has noticed lingering fatigue over the past few weeks that has limited his activity at the gym. Also endorses associated lightheadedness when moving from sitting to standing. " Reports that he was previously experiencing joint pain in his hands that has improved significantly. He reports intermittent tingling in his fingers, but no longer endorses pain or stiffness. Denies any fevers, chills, vision changes, aphthous ulcers, rashes, skin changes at this time. He has received the tdap vaccine at his PCP recently. Has not received flu, pneumovax, or shingrix at this time.     Scheduled for infliximab infusion today.       ROS:    No fevers or chills  No weight loss  No blurry vision, double vision or change in vision  No sore throat  No lymphadenopathy  No headache, paraesthesias, or weakness in a limb  No shortness of breath or wheezing  No chest pain or pressure  No arthralgias or myalgias  No rashes or skin changes  No odynophagia or dysphagia  No BRBPR, hematochezia, melena  No dysuria, frequency or urgency  No hot/cold intolerance or polyria  No anxiety or depression    Extra intestinal manifestations of IBD:  No uveitis/episcleritis  No aphthous ulcers   No arthritis   No erythema nodosum/pyoderma gangrenosum.     PERTINENT PAST MEDICAL HISTORY:  No past medical history on file.    PREVIOUS SURGERIES:  Past Surgical History:   Procedure Laterality Date    COLONOSCOPY N/A 4/7/2023    Procedure: COLONOSCOPY, WITH BIOPSY;  Surgeon: Lamont Seaman MD;  Location: [facility] OR    LAPAROSCOPIC APPENDECTOMY N/A 2/22/2023    Procedure: DIAGNOSTIC LAPAROSCOPY, LYSIS OF ADHESION, DRAIN PLACEMENT;  Surgeon: Fabiola Miranda MD;  Location: [facility] OR       PREVIOUS ENDOSCOPY:  Results for orders placed or performed during the hospital encounter of 04/07/23   COLONOSCOPY   Result Value Ref Range    COLONOSCOPY       [facility]  [address] [place] [address], [place] [address] [phone]     Endoscopy Department  _______________________________________________________________________________  Patient Name: Karlos Terrazas            Procedure Date: 4/7/2023 6:37 AM  MRN: [id number]                        Account Number: [id number]  YOB: 1985              Admit Type: Outpatient  Age: 37                               Room: [facility] PROCEDURE ROOM 02  Gender: Male                          Note Status: Finalized  Attending MD: KILO PALMA MD   Total Sedation Time:   _______________________________________________________________________________     Procedure:             Colonoscopy  Indications:           Suspected Crohn's disease of the small bowel and colon  Providers:             KILO PALMA MD, Sera Benítez, NEIL, HAI MARRUFO  Patient Profile:       This is a 37 year old male with recent symptoms of RLQ                          pain and we ight loss over a period of > 1 month. He                          was diagnosed with appendicitis and had surgery;                          however, finding of inflammation in the right colon                          and terminal ileum led to aborted surgery and a                          request for a colonoscopy.  Referring MD:          NICHOLAS ISAAC MD  Medicines:             Monitored Anesthesia Care  Complications:         No immediate complications.  _______________________________________________________________________________  Procedure:             Pre-Anesthesia Assessment:                         - Airway Examination: normal oropharyngeal airway and                          neck mobility.                         - Respiratory Examination: clear to auscultation.                         - CV Examination: regular rate and rhythm.                         - ASA Grade Assessment: I - A normal, healthy patient.                         - After reviewing the risks and benefits, the  patient                          was deemed in satisfactory condition to undergo the                          procedure.                         - The anesthesia plan was to use deep                          sedation/analgesia.                          - Immediately prior to administration of medications,                          the patient was re-assessed for adequacy to receive                          sedatives.                         - Sedation was administered by an anesthesia                          professional. Deep sedation was attained.                         - The heart rate, respiratory rate, oxygen                          saturations, blood pressure, adequacy of pulmonary                          ventilation, and response to care were monitored                          throughout the procedure.                         - The physical status of the patient was re-assessed                          after the procedure.                         After obtaining informed cons ent, the colonoscope was                          passed under direct vision. Throughout the procedure,                          the patient's blood pressure, pulse, and oxygen                          saturations were monitored continuously. The                          Colonoscope was introduced through the anus and                          advanced to the terminal ileum. The colonoscopy was                          performed without difficulty. The patient tolerated                          the procedure well. The quality of the bowel                          preparation was evaluated using the BBPS (Flushing Bowel                          Preparation Scale) with scores of: Right Colon = 3,                          Transverse Colon = 3 and Left Colon = 3 (entire mucosa                          seen well with no residual staining, small fragments                          of stool or opaque liquid). The total BBPS score                          equals 9. The terminal ileum, ileocecal va lve,                          appendiceal orifice, and rectum were photographed.                                                                                   Findings:       No aphthae  or ulcerations. The IC valve is open; however, advancing the        scope beyond ~ 3 cm into the terminal ileum was not possible due to        either stricture or extrinsic compression. Biopsies were taken in the        accessible terminal ileum.       Essentially unremarkable mucosa, although rare punctate red lesions with        whitish center were noted in the left colon. Biopsies of the colon        mucosa were done.       The area of the appendix was edematous.                                                                                   Impression:            - The findings are not diagnostic of Crohn's disease                          and could be consistent with recent appendicitis.                          However, examination of the terminal ileum was limited.  Recommendation:        -  Await pathology results.                         - Consider repeat abdominal imaging.                                                                                     Signed Electronically by Lamont Palma MD  _____________________  LAMONT PALMA MD  4/7/2023 10:06:36 AM  I was physically present for the entire viewing portion of the exam.  __________________________  Signature of teaching physician  LAMONT PALMA MD  Number of Addenda: 0    Note Initiated On: 4/7/2023 6:37 AM  Scope In:  Scope Out:         ALLERGIES:   No Known Allergies    PERTINENT MEDICATIONS:    Current Outpatient Medications:     cetirizine (ZYRTEC) 10 MG tablet, Take 10 mg by mouth daily, Disp: , Rfl:     fluticasone (FLONASE ALLERGY RELIEF) 50 MCG/ACT nasal spray, Spray 1 spray into both nostrils daily, Disp: , Rfl:     SOCIAL HISTORY:  Social History     Socioeconomic History    Marital status:      Spouse name: Not on file    Number of children: Not on file    Years of education: Not on file    Highest education level: Not on file   Occupational History    Not on file   Tobacco Use    Smoking status: Never    Smokeless  tobacco: Never   Vaping Use    Vaping Use: Never used   Substance and Sexual Activity    Alcohol use: Yes     Comment: occasional use    Drug use: Yes     Types: Marijuana    Sexual activity: Yes     Partners: Female   Other Topics Concern    Parent/sibling w/ CABG, MI or angioplasty before 65F 55M? No   Social History Narrative    Not on file     Social Determinants of Health     Financial Resource Strain: Not on file   Food Insecurity: Not on file   Transportation Needs: Not on file   Physical Activity: Not on file   Stress: Not on file   Social Connections: Not on file   Interpersonal Safety: Not on file   Housing Stability: Not on file       FAMILY HISTORY:  No family history on file.    Past/family/social history reviewed and no changes    PHYSICAL EXAMINATION:  Constitutional: aaox3, cooperative, pleasant, not dyspneic/diaphoretic, no acute distress  Vitals reviewed: /72   Pulse 85   Ht 1.829 m (6')   Wt 66.7 kg (147 lb)   SpO2 99%   BMI 19.94 kg/m    Wt:   Wt Readings from Last 2 Encounters:   09/27/23 66.7 kg (147 lb)   08/02/23 66.1 kg (145 lb 11.2 oz)      Eyes: Sclera anicteric  Ears/nose/mouth/throat: Normal oropharynx without ulcers or exudate, mucus membranes moist  CV: RRR, no edema  Respiratory: Unlabored breathing, CTAB   Lymph: No axillary, submandibular, supraclavicular or inguinal lymphadenopathy  Abd:  Nondistended, +bs, no hepatosplenomegaly, nontender, no peritoneal signs  Skin: warm, perfused, no jaundice  Psych: Normal affect  MSK: Normal gait      PERTINENT STUDIES:  Most recent CBC:  Recent Labs   Lab Test 09/26/23  0832 08/02/23  1212   WBC 5.5 5.4   HGB 12.2* 11.1*   HCT 38.4* 36.4*    372     Most recent hepatic panel:  Recent Labs   Lab Test 09/26/23  0832 08/02/23  1212   ALT 8 12   AST 17 20     Most recent creatinine:  Recent Labs   Lab Test 05/10/23  1601 05/03/23  1012   CR 0.90 0.74

## 2023-09-27 NOTE — PROGRESS NOTES
Skilled Nurse visit in the Patient Home to administer Remicade (infliximab) 700mg. No recent elevated temperature, fever, chills, productive cough, coughing for 3 weeks or longer or hemoptysis, abnormal vital signs, night sweats, chest pain. No decrease in your appetite, unexplained weight loss or fatigue. No other new onset medical symptoms. Current weight 147lb. Peripheral IVleft AC, 1 attempt. Labs drawn Infliximab level. Infusion completed without complication or reaction. Pt reports therapy is effective in managing symptoms related to therapy.

## 2023-09-27 NOTE — TELEPHONE ENCOUNTER
Per Dr. Suresh recommendations- complete MRE prior to next clinic visit. Scheduled for 12/12.    Clinic visit scheduled with Dr. Suresh on 12/13, ok to cancel clinic visit on 11/7.     Appointment canceled on 11/7; updated patient via [employer].

## 2023-09-27 NOTE — LETTER
9/27/2023         RE: Karlos Terrazas  [address] [address]        Dear Colleague,    Thank you for referring your patient, Karlos Terrazas, to the [facility] GASTROENTEROLOGY CLINIC Outing. Please see a copy of my visit note below.      Type of service: In Person     IBD CLINIC VISIT    CC/REFERRING MD:  No ref. provider found    REASON FOR VISIT: follow-up Crohn's ileitis and colitis    ASSESSMENT/PLAN:    1. Moderate to severe Crohn's ileitis (multifocal with stricturing) and colitis (histologically only).   Current medication: infliximab 5mg/kg every 8 weeks - initiated 6/20/2023   Current clinical disease activity: HBI 0   Last endoscopic disease activity: colonoscopy 4/2023 IC valve is open; however, advancing the scope beyond ~ 3 cm into the terminal ileum was not possible due to either stricture or extrinsic compression.  Biopsies show active ileitis with granulomas and patchy active colitis on random colonic biopsies. MRE 5/2023 active small bowel inflammation with stricturing.    Patient achieved symptomatic remission with initiation of infliximab therapy.  Denies any abdominal pain or obstructive symptoms with 1 formed bowel movement per day. Endorses some lingering fatigue that's exacerbated by exercise, however otherwise doing well with good appetite and weight gain. Labs today without any signs of inflammation CRP < 3 and ESR 10. Will plan to continue infliximab every 8 weeks with repeat labs and MRE imaging in 3 months to assess response to therapy.     Recommendations:   -Check infliximab level prior to infusion today  -Continue infliximab every 8 weeks   -Labs to include CBC, LFTs, CRP, ESR every 3 months   -MRE 6 months after initiation of therapy ~ 12/2023       2. Iron Deficiency Anemia- likely related to CD  Patient endorsing mild fatigue that's exacerbated by exercise. Denies any associated orthostatic changes, chest pain or shortness of breath. Labs  consistent with mild iron deficiency anemia with hgb 12.2, mcv 77, ferritin 7, iron 30. No evidence of B12 deficiency 401 on 5/2023. Shared decision making regarding IV iron vs oral iron given symptomatic anemia. Patient agreeable to trying ferrous gluconate with vitamin C supplementation.     Recommendations  -Initiate ferrous gluconate with vitamin C supplementation     Patient seen and discussed with Dr. Suresh.     Judith Duggan, PGY-1  Internal Medicine  Gastroenterology      40 minutes spent on the date of the encounter performing chart review, history and exam, documentation and further activities as noted above.       IBD HISTORY  Age at diagnosis: 37 (2023)  Extent of disease: mainly small intestine but histologic colon as well on random biopsies  Disease phenotype: inflammatory/stricturing  Lakshmi-anal disease: none  Current CD medications: none  Prior IBD surgeries: none   Prior IBD Medications: none    DRUG MONITORING  TPMT enzyme activity: pending    6-TGN/6-MMPN levels: NA    Biologic concentration: NA    DISEASE ASSESSMENT  Labs  Recent Labs   Lab Test 09/26/23  0832 08/02/23  1212 05/10/23  1601 05/03/23  1012   CRP  --   --   --  1.3*   SED 10 11   < > 16*    < > = values in this interval not displayed.     Fecal calprotectin: not checked    Endoscopy:   -colonoscopy 4/2022 - strictured ileum 3 cm prox to IC valve - biopsies show active ileitis with granulomas. Mainly chronic changes seen at IC valve/distal ileum on colon. Colon appeared normal but had patchy colitis with granulomas  Enterography:   -MRE 5/3/23:  ENTEROGRAPHY: No penetrating disease. The colon and stomach are unremarkable.     1. An approximately 20 cm segment of small bowel at the terminal ileum with moderate wall thickening, luminal narrowing and stratified mural hyperenhancement is consistent with active inflammation, images 5-15, series 3. No stricturing.     2. A 7.3 cm segment in the small bowel in the midabdomen with mild wall  thickening, luminal narrowing and stratified mural hyperenhancement is consistent with active inflammation, image 7:3. Stricturing present with the proximal small bowel measuring 4.9   cm, image 6:3.     3. A 6.8 cm segment in the small bowel in the mid right abdomen with mild wall thickening, luminal narrowing and stratified mural hyperenhancement is consistent with active inflammation, image 9:3. Stricturing present with the proximal small bowel   measuring 3.7 cm, image 6:3.  C diff: not checked (no diarrhea)    sIBDQ:   IBDQ Score Date IBDQ - Total Score  (Higher score better)   8/9/2023   8:26 AM 55   5/5/2023  11:59 AM 49       IBD Health Care Maintenance:    Vaccinations:  All patients on biologics should avoid live vaccines.    -- Influenza (every year) - pending   -- TdaP (every 10 years) - completed   -- Pneumococcal Pneumonia (once plus booster at 5 years) - pending   -- Shingrix - pending   -- Yearly assessment for latent Tb     One time confirmation of immunity or serologies:  -- Hepatitis A (serologies or immunizations)  -- Hepatitis B (serologies or immunizations)  -- Varicella  -- MMR  -- HPV (all aged 18-26)  -- Meningococcal meningitis (all patients at risk for meningitis)  -- Due to the immunosuppression in this patient, I would not advise administration of live vaccines such as varicella/VZV, intranasal influenza, MMR, or yellow fever vaccine (if travelling).      Bone mineral density screening   -- Recommend all patients supplement with calcium and vitamin D  -- Given no prior steroid use recommend DEXA not needed at this time    Cancer Screening:  Colon cancer screening:  Given colonic involvement, recommend patient undergo regular dysplasia surveillance   Next dysplasia screening is recommended 2031.    Skin cancer screening: Annual visual exam of skin by dermatologist since patient is immunocompromised    Depression Screening:  -- Over the last month, have you felt down, depressed, or  hopeless? no  -- Over the last month, have you felt little interest or pleasure doing things? no    Misc:  -- Avoid tobacco use  -- Avoid NSAIDs as there is potentially a 25% chance of causing an IBD flare    Judith Duggan, PGY-1  Internal Medicine  Gastroenterology Service     8/10/2023 - Interval History - Virtual Visit   First infusion of infliximab was 6/20/23.     Has felt much better over the last few weeks. Currently having 1 formed stools per day. On occasion 1-2 times per week might have a looser bowel movement. Notes a residual right lower quadrant ache that is much improved since being on remicade. No blood in the stool (not really a presenting symptom for him). Next infusion is 9/27/23.     Last saw Dr. Leo to [facility] 5/10/23. Additional details below:    Karlos notes that he has had intermittent GI symptoms for several years.  However, he noticed things worsened more substantially in November 2022.  At that time he noticed increased generalized abdominal discomfort and irregular bowel habits.  He noticed during this time period that his abdomen was quite tender to the touch.  If his children were to bump into his abdomen he would have substantial discomfort.  He eventually saw his primary care doctor in January of this year.  His primary care doctor did order a CT scan.  The initial read of the CT scan was concerning for appendicitis.  Interestingly, by the time the CT scan was done he reports that his abdominal discomfort had significantly improved.  However because of the ominous findings on the CT scan he did go to the emergency room.  He was seen by general surgery and taken to the operating room.  However, once they entered the abdomen they found there was significant evidence of inflammation and bleeding.  The majority of inflammation was noted in the small bowel and the cecum.  The appendix was hard to access and nonmobile with lots of adhesive tissue.  However, it was noted itself to be  "healthy and not perforated.  Hemostasis was achieved and no resection was performed.  There was concern for Crohn's disease and the patient was referred for colonoscopy.      Dr. Seaman performed a colonoscopy on 4/7/2023.  The IC valve was open but the scope could not be advanced beyond 3 cm into the terminal ileum due to stricturing.  No apathy or ulcerations were seen.  Biopsies were taken of the visualized terminal ileum.  The colonic mucosa was essentially unremarkable though rare punctate red lesions with whitish center were noticed in the left colon.  Biopsies were done.  The area of the appendix was edematous.  Biopsies of both the ileum and the colon showed active changes with granulomas.  This was felt to be consistent with Crohn's disease in the correct clinical setting.      An MR enterography was performed and showed multiple areas of small bowel with moderate wall thickening and luminal narrowing.  Changes were also consistent with active inflammation.  There was a 20 cm segment in the terminal ileum of involvement with no stricturing.  There was another 7.3 cm segment in the mid abdominal region with some stricturing.  There was another 6.8 cm segment in the right mid abdomen with some stricturing present.    Laboratory studies have been obtained which do show evidence of inflammation with CRP of 11.  Mild anemia at 11.3.  Mild thrombocytosis at 470.  Normal white blood cell count.  Vitamin B12 and folate are normal.  Ferritin low at 7.    He denies any family history of Crohn's disease or ulcerative colitis.  No history of colon cancer or colon polyps.    9/27/2023 - Interval History - In Person Visit   Patient doing well today. Feels the best that he's felt in many months. Denies any abdominal pain or discomfort, however reports intermittent \"twinges\" in the right lower quadrant unrelated to food ingestion. Reports 1 formed stool per day. Denies any looser bowel movements over past month. Reports " good appetite and weight has returned to baseline. He does avoid big salads, fruits and red meat. He has noticed lingering fatigue over the past few weeks that has limited his activity at the gym. Also endorses associated lightheadedness when moving from sitting to standing. Reports that he was previously experiencing joint pain in his hands that has improved significantly. He reports intermittent tingling in his fingers, but no longer endorses pain or stiffness. Denies any fevers, chills, vision changes, aphthous ulcers, rashes, skin changes at this time. He has received the tdap vaccine at his PCP recently. Has not received flu, pneumovax, or shingrix at this time.     Scheduled for infliximab infusion today.       ROS:    No fevers or chills  No weight loss  No blurry vision, double vision or change in vision  No sore throat  No lymphadenopathy  No headache, paraesthesias, or weakness in a limb  No shortness of breath or wheezing  No chest pain or pressure  No arthralgias or myalgias  No rashes or skin changes  No odynophagia or dysphagia  No BRBPR, hematochezia, melena  No dysuria, frequency or urgency  No hot/cold intolerance or polyria  No anxiety or depression    Extra intestinal manifestations of IBD:  No uveitis/episcleritis  No aphthous ulcers   No arthritis   No erythema nodosum/pyoderma gangrenosum.     PERTINENT PAST MEDICAL HISTORY:  No past medical history on file.    PREVIOUS SURGERIES:  Past Surgical History:   Procedure Laterality Date    COLONOSCOPY N/A 4/7/2023    Procedure: COLONOSCOPY, WITH BIOPSY;  Surgeon: Lamont Seaman MD;  Location: [facility] OR    LAPAROSCOPIC APPENDECTOMY N/A 2/22/2023    Procedure: DIAGNOSTIC LAPAROSCOPY, LYSIS OF ADHESION, DRAIN PLACEMENT;  Surgeon: Fabiola Miranda MD;  Location: [facility] OR       PREVIOUS ENDOSCOPY:  Results for orders placed or performed during the hospital encounter of 04/07/23   COLONOSCOPY   Result Value Ref Range    COLONOSCOPY       Clinics and Surgery  [address] [place] [address] [phone]     Endoscopy Department  _______________________________________________________________________________  Patient Name: Nicholas Terrazas            Procedure Date: 4/7/2023 6:37 AM  MRN: [id number]                       Account Number: [id number]  YOB: 1985              Admit Type: Outpatient  Age: 37                               Room: [facility] PROCEDURE ROOM 02  Gender: Male                          Note Status: Finalized  Attending MD: KILO PALMA MD   Total Sedation Time:   _______________________________________________________________________________     Procedure:             Colonoscopy  Indications:           Suspected Crohn's disease of the small bowel and colon  Providers:             KILO PALMA MD, Sera Benítez RN, HAI MARRUFO  Patient Profile:       This is a 37 year old male with recent symptoms of RLQ                          pain and we ight loss over a period of > 1 month. He                          was diagnosed with appendicitis and had surgery;                          however, finding of inflammation in the right colon                          and terminal ileum led to aborted surgery and a                          request for a colonoscopy.  Referring MD:          NICHOLAS ISAAC MD  Medicines:             Monitored Anesthesia Care  Complications:         No immediate complications.  _______________________________________________________________________________  Procedure:             Pre-Anesthesia Assessment:                         - Airway Examination: normal oropharyngeal airway and                          neck mobility.                         - Respiratory Examination: clear to auscultation.                         - CV Examination: regular rate and rhythm.                         - ASA Grade Assessment: I - A normal, healthy patient.                         - After  reviewing the risks and benefits, the  patient                          was deemed in satisfactory condition to undergo the                          procedure.                         - The anesthesia plan was to use deep                          sedation/analgesia.                         - Immediately prior to administration of medications,                          the patient was re-assessed for adequacy to receive                          sedatives.                         - Sedation was administered by an anesthesia                          professional. Deep sedation was attained.                         - The heart rate, respiratory rate, oxygen                          saturations, blood pressure, adequacy of pulmonary                          ventilation, and response to care were monitored                          throughout the procedure.                         - The physical status of the patient was re-assessed                          after the procedure.                         After obtaining informed cons ent, the colonoscope was                          passed under direct vision. Throughout the procedure,                          the patient's blood pressure, pulse, and oxygen                          saturations were monitored continuously. The                          Colonoscope was introduced through the anus and                          advanced to the terminal ileum. The colonoscopy was                          performed without difficulty. The patient tolerated                          the procedure well. The quality of the bowel                          preparation was evaluated using the BBPS ([place] Bowel                          Preparation Scale) with scores of: Right Colon = 3,                          Transverse Colon = 3 and Left Colon = 3 (entire mucosa                          seen well with no residual staining, small fragments                          of stool or opaque  liquid). The total BBPS score                          equals 9. The terminal ileum, ileocecal va lve,                          appendiceal orifice, and rectum were photographed.                                                                                   Findings:       No aphthae or ulcerations. The IC valve is open; however, advancing the        scope beyond ~ 3 cm into the terminal ileum was not possible due to        either stricture or extrinsic compression. Biopsies were taken in the        accessible terminal ileum.       Essentially unremarkable mucosa, although rare punctate red lesions with        whitish center were noted in the left colon. Biopsies of the colon        mucosa were done.       The area of the appendix was edematous.                                                                                   Impression:            - The findings are not diagnostic of Crohn's disease                          and could be consistent with recent appendicitis.                          However, examination of the terminal ileum was limited.  Recommendation:        -  Await pathology results.                         - Consider repeat abdominal imaging.                                                                                     Signed Electronically by Lamont Palma MD  _____________________  LAMONT PALMA MD  4/7/2023 10:06:36 AM  I was physically present for the entire viewing portion of the exam.  __________________________  Signature of teaching physician  LAMONT PALMA MD  Number of Addenda: 0    Note Initiated On: 4/7/2023 6:37 AM  Scope In:  Scope Out:         ALLERGIES:   No Known Allergies    PERTINENT MEDICATIONS:    Current Outpatient Medications:     cetirizine (ZYRTEC) 10 MG tablet, Take 10 mg by mouth daily, Disp: , Rfl:     fluticasone (FLONASE ALLERGY RELIEF) 50 MCG/ACT nasal spray, Spray 1 spray into both nostrils daily, Disp: , Rfl:     SOCIAL HISTORY:  Social  History     Socioeconomic History    Marital status:      Spouse name: Not on file    Number of children: Not on file    Years of education: Not on file    Highest education level: Not on file   Occupational History    Not on file   Tobacco Use    Smoking status: Never    Smokeless tobacco: Never   Vaping Use    Vaping Use: Never used   Substance and Sexual Activity    Alcohol use: Yes     Comment: occasional use    Drug use: Yes     Types: Marijuana    Sexual activity: Yes     Partners: Female   Other Topics Concern    Parent/sibling w/ CABG, MI or angioplasty before 65F 55M? No   Social History Narrative    Not on file     Social Determinants of Health     Financial Resource Strain: Not on file   Food Insecurity: Not on file   Transportation Needs: Not on file   Physical Activity: Not on file   Stress: Not on file   Social Connections: Not on file   Interpersonal Safety: Not on file   Housing Stability: Not on file       FAMILY HISTORY:  No family history on file.    Past/family/social history reviewed and no changes    PHYSICAL EXAMINATION:  Constitutional: aaox3, cooperative, pleasant, not dyspneic/diaphoretic, no acute distress  Vitals reviewed: /72   Pulse 85   Ht 1.829 m (6')   Wt 66.7 kg (147 lb)   SpO2 99%   BMI 19.94 kg/m    Wt:   Wt Readings from Last 2 Encounters:   09/27/23 66.7 kg (147 lb)   08/02/23 66.1 kg (145 lb 11.2 oz)      Eyes: Sclera anicteric  Ears/nose/mouth/throat: Normal oropharynx without ulcers or exudate, mucus membranes moist  CV: RRR, no edema  Respiratory: Unlabored breathing, CTAB   Lymph: No axillary, submandibular, supraclavicular or inguinal lymphadenopathy  Abd:  Nondistended, +bs, no hepatosplenomegaly, nontender, no peritoneal signs  Skin: warm, perfused, no jaundice  Psych: Normal affect  MSK: Normal gait      PERTINENT STUDIES:  Most recent CBC:  Recent Labs   Lab Test 09/26/23  0832 08/02/23  1212   WBC 5.5 5.4   HGB 12.2* 11.1*   HCT 38.4* 36.4*     372     Most recent hepatic panel:  Recent Labs   Lab Test 09/26/23  0832 08/02/23  1212   ALT 8 12   AST 17 20     Most recent creatinine:  Recent Labs   Lab Test 05/10/23  1601 05/03/23  1012   CR 0.90 0.74     I performed a history and physical examination of the above patient and discussed the management with Dr. Duggan on 9/27/2023. I reviewed the note and there are no changes to the past medical, family or social history.  A complete 10 point review of systems was obtained. Please see the HPI for pertinent positives and negatives. All other systems were reviewed and were found to be negative.     I agree with the documented findings and plan of care as outlined.      Again, thank you for allowing me to participate in the care of your patient.      Sincerely,    Priscilla Suresh MD

## 2023-09-27 NOTE — PROGRESS NOTES
I performed a history and physical examination of the above patient and discussed the management with Dr. Duggan on 9/27/2023. I reviewed the note and there are no changes to the past medical, family or social history.  A complete 10 point review of systems was obtained. Please see the HPI for pertinent positives and negatives. All other systems were reviewed and were found to be negative.     I agree with the documented findings and plan of care as outlined.    Priscilla Suresh MD  GI Attending  Pager: [phone]

## 2023-09-27 NOTE — NURSING NOTE
Chief Complaint   Patient presents with    Follow Up       Vitals:    09/27/23 0759   BP: 120/72   Pulse: 85   SpO2: 99%   Weight: 66.7 kg (147 lb)   Height: 1.829 m (6')       Body mass index is 19.94 kg/m .    Liya Graham

## 2023-09-27 NOTE — Clinical Note
Vamsi HO  Karlos is feeling well on Remicade! He'll get his trough level checked today. I ordered an MRE to be done in Dec, which will be 6 months after starting the Remicade.   As you know, he's enrolled in the OPTIMIZE trial and is in the iDose group.  Thanks, E

## 2023-09-27 NOTE — PATIENT INSTRUCTIONS
PLAN  -Check infliximab level prior to infusion today  -Continue infliximab every 8 weeks   -Labs to include CBC, LFTs, CRP, ESR every 3 months   -MRE 6 months after initiation of therapy ~ 12/2023

## 2023-10-02 ENCOUNTER — MYC MEDICAL ADVICE (OUTPATIENT)
Dept: GASTROENTEROLOGY | Facility: CLINIC | Age: 38
End: 2023-10-02
Payer: COMMERCIAL

## 2023-10-02 LAB
INFLIXIMAB AB SERPL IA-MCNC: <3.1 U/ML
INFLIXIMAB SERPL-MCNC: 7.7 UG/ML

## 2023-10-02 NOTE — TELEPHONE ENCOUNTER
IFX level discontinued, as this was completed at 9/27 infusion. Updated therapy plan.     Routine standing labs in place: CBC w/ diff, ESR, CRP, and hepatic panel. Updated patient via [employer].

## 2023-10-25 ENCOUNTER — TELEPHONE (OUTPATIENT)
Dept: GASTROENTEROLOGY | Facility: CLINIC | Age: 38
End: 2023-10-25
Payer: COMMERCIAL

## 2023-10-25 NOTE — TELEPHONE ENCOUNTER
1st attempt- LVM and sent mychart    Reschedule 12/13/23 appointment with Dr. Suresh as provider is no longer available that day. Reschedule to next available with provider.

## 2023-11-17 NOTE — PROGRESS NOTES
Per the Optimize study algorithm, the patient is to infuse 5mg/kg of IFX for upcoming infusion on 11/22. Therapy plan updated.

## 2023-11-22 ENCOUNTER — DOCUMENTATION ONLY (OUTPATIENT)
Dept: PHARMACY | Facility: CLINIC | Age: 38
End: 2023-11-22

## 2023-11-22 ENCOUNTER — LAB (OUTPATIENT)
Dept: LAB | Facility: CLINIC | Age: 38
End: 2023-11-22
Payer: COMMERCIAL

## 2023-11-22 DIAGNOSIS — K50.90 CROHN'S DISEASE (H): ICD-10-CM

## 2023-11-22 LAB
ALBUMIN SERPL BCG-MCNC: 4.4 G/DL (ref 3.5–5.2)
ALP SERPL-CCNC: 97 U/L (ref 40–150)
ALT SERPL W P-5'-P-CCNC: 30 U/L (ref 0–70)
AST SERPL W P-5'-P-CCNC: 23 U/L (ref 0–45)
BASOPHILS # BLD AUTO: 0.1 10E3/UL (ref 0–0.2)
BASOPHILS NFR BLD AUTO: 1 %
BILIRUB DIRECT SERPL-MCNC: <0.2 MG/DL (ref 0–0.3)
BILIRUB SERPL-MCNC: 0.3 MG/DL
CRP SERPL-MCNC: 9.06 MG/L
EOSINOPHIL # BLD AUTO: 0.2 10E3/UL (ref 0–0.7)
EOSINOPHIL NFR BLD AUTO: 3 %
ERYTHROCYTE [DISTWIDTH] IN BLOOD BY AUTOMATED COUNT: 16.5 % (ref 10–15)
ERYTHROCYTE [SEDIMENTATION RATE] IN BLOOD BY WESTERGREN METHOD: 21 MM/HR (ref 0–15)
HCT VFR BLD AUTO: 39.7 % (ref 40–53)
HGB BLD-MCNC: 12.5 G/DL (ref 13.3–17.7)
IMM GRANULOCYTES # BLD: 0 10E3/UL
IMM GRANULOCYTES NFR BLD: 0 %
LYMPHOCYTES # BLD AUTO: 2 10E3/UL (ref 0.8–5.3)
LYMPHOCYTES NFR BLD AUTO: 36 %
MCH RBC QN AUTO: 26.3 PG (ref 26.5–33)
MCHC RBC AUTO-ENTMCNC: 31.5 G/DL (ref 31.5–36.5)
MCV RBC AUTO: 84 FL (ref 78–100)
MONOCYTES # BLD AUTO: 0.5 10E3/UL (ref 0–1.3)
MONOCYTES NFR BLD AUTO: 9 %
NEUTROPHILS # BLD AUTO: 2.8 10E3/UL (ref 1.6–8.3)
NEUTROPHILS NFR BLD AUTO: 51 %
NRBC # BLD AUTO: 0 10E3/UL
NRBC BLD AUTO-RTO: 0 /100
PLATELET # BLD AUTO: 545 10E3/UL (ref 150–450)
PROT SERPL-MCNC: 7.6 G/DL (ref 6.4–8.3)
RBC # BLD AUTO: 4.75 10E6/UL (ref 4.4–5.9)
WBC # BLD AUTO: 5.5 10E3/UL (ref 4–11)

## 2023-11-22 PROCEDURE — 80076 HEPATIC FUNCTION PANEL: CPT | Performed by: PATHOLOGY

## 2023-11-22 PROCEDURE — 85025 COMPLETE CBC W/AUTO DIFF WBC: CPT | Performed by: PATHOLOGY

## 2023-11-22 PROCEDURE — 86140 C-REACTIVE PROTEIN: CPT | Performed by: PATHOLOGY

## 2023-11-22 PROCEDURE — 36415 COLL VENOUS BLD VENIPUNCTURE: CPT | Performed by: PATHOLOGY

## 2023-11-22 PROCEDURE — 85652 RBC SED RATE AUTOMATED: CPT | Performed by: PATHOLOGY

## 2023-11-23 NOTE — PROGRESS NOTES
Skilled Nurse visit in the Patient Home to administer Remicade 300mg.  No recent elevated temperature, fever, chills, productive cough, coughing for 3 weeks or longer or hemoptysis, abnormal vital signs, night sweats, chest pain. No  decrease in your appetite, unexplained weight loss or fatigue.  No other new onset medical symptoms.  Current weight 147 lbs.  Peripheral IVleft AC, 1attempt  Infusion completed without complication or reaction. Pt reports therapy iseffective in managing symptoms related to therapy.     Christal Magaña RN, BSN  [facility]  [phone]  [email or web address]

## 2023-12-05 ENCOUNTER — OFFICE VISIT (OUTPATIENT)
Dept: DERMATOLOGY | Facility: CLINIC | Age: 38
End: 2023-12-05
Payer: COMMERCIAL

## 2023-12-05 DIAGNOSIS — D22.9 MULTIPLE BENIGN NEVI: Primary | ICD-10-CM

## 2023-12-05 DIAGNOSIS — D22.9 ACRAL NEVUS: ICD-10-CM

## 2023-12-05 DIAGNOSIS — D18.01 CHERRY ANGIOMA: ICD-10-CM

## 2023-12-05 DIAGNOSIS — K50.018 CROHN'S DISEASE OF ILEUM WITH OTHER COMPLICATION (H): ICD-10-CM

## 2023-12-05 PROCEDURE — 99203 OFFICE O/P NEW LOW 30 MIN: CPT | Mod: GC | Performed by: STUDENT IN AN ORGANIZED HEALTH CARE EDUCATION/TRAINING PROGRAM

## 2023-12-05 NOTE — PROGRESS NOTES
[facility] Dermatology Note  Encounter Date: Dec 5, 2023  Office Visit     Dermatology Problem List:  # Patient on infliximab for Crohn's disease  # Family history of melanoma    ____________________________________________    Assessment & Plan:     # Patient on infliximab for Crohn's disease  # Family history of melanoma  - Signs and symptoms of NMSC discussed  - ABCDs of melanoma were discussed and self skin checks were advised  - Sun precaution was advised including the use of sun screens of SPF 30 or higher, sun protective clothing, and avoidance of tanning beds    # Benign lesions (cherry angiomas, clinically banal appearing melanocytic nevi, multiple acral nevi)  - Reassurance provided and benign nature of condition discussed    Procedures Performed:   None    Follow-up: 1 year(s) in-person, or earlier for new or changing lesions    Staff and Resident:     Attending: Dr. Smith staffed the patient.    Resident: Cade Serrato MD (PGY-4)    ____________________________________________    CC: Derm Problem (FBSE ; new start of infliximab needs yearly FBSE)    HPI:  Mr. Karlos Terrazas is a(n) 38 year old male who presents today as a new patient for skin check. Started infliximab 6 months ago for Crohn's disease which has been very helpful. No personal history of skin cancer, but does report family history of melanoma in his mom (age ~60) and paternal grandmother on their legs. Patient is otherwise feeling well, without additional skin concerns.    Labs Reviewed:  N/A    Physical Exam:  Vitals: There were no vitals taken for this visit.  SKIN: Full skin, which includes the head/face, both arms, chest, back, abdomen,both legs, genitalia and/or groin buttocks, digits and/or nails, was examined.  - There are dome shaped bright red papules on the trunk.   - Multiple regular light tan to brown pigmented macules and papules are identified on the trunk and extremities (including left toes)  - No other  lesions of concern on areas examined.     Medications:  Current Outpatient Medications   Medication    cetirizine (ZYRTEC) 10 MG tablet    fluticasone (FLONASE ALLERGY RELIEF) 50 MCG/ACT nasal spray     No current facility-administered medications for this visit.      Past Medical History:   Patient Active Problem List   Diagnosis    Appendicitis, unspecified appendicitis type    Acute appendicitis with localized peritonitis and gangrene, without perforation or abscess    Crohn's disease (H)    Anemia, iron deficiency     History reviewed. No pertinent past medical history.    CC Lazaro Leo MD  [address] [place] [id number] on close of this encounter.

## 2023-12-05 NOTE — NURSING NOTE
Dermatology Rooming Note    Karlos Terrazas's goals for this visit include:   Chief Complaint   Patient presents with    Derm Problem     CHARLES Thakur, EMT-B

## 2023-12-05 NOTE — PROGRESS NOTES
Dermatology Rooming Note    Karlos Terrazas's goals for this visit include:   Chief Complaint   Patient presents with    Derm Problem     FBSE ; new start of infliximab needs yearly FBSE     Bon Thakur, EMT-B

## 2023-12-05 NOTE — LETTER
12/5/2023       RE: Karlos Terrazas  [address] [facility] [address]     Dear Colleague,    Thank you for referring your patient, Karlos Terrazas, to the [facility] DERMATOLOGY CLINIC [place] [facility]. Please see a copy of my visit note below.    Dermatology Rooming Note    Karlos Terrazas's goals for this visit include:   Chief Complaint   Patient presents with    Derm Problem     FBSE ; new start of infliximab needs yearly FBSE     REVA Garrett-B      [facility] Dermatology Note  Encounter Date: Dec 5, 2023  Office Visit     Dermatology Problem List:  # Patient on infliximab for Crohn's disease  # Family history of melanoma    ____________________________________________    Assessment & Plan:     # Patient on infliximab for Crohn's disease  # Family history of melanoma  - Signs and symptoms of NMSC discussed  - ABCDs of melanoma were discussed and self skin checks were advised  - Sun precaution was advised including the use of sun screens of SPF 30 or higher, sun protective clothing, and avoidance of tanning beds    # Benign lesions (cherry angiomas, clinically banal appearing melanocytic nevi, multiple acral nevi)  - Reassurance provided and benign nature of condition discussed    Procedures Performed:   None    Follow-up: 1 year(s) in-person, or earlier for new or changing lesions    Staff and Resident:     Attending: Dr. Smith staffed the patient.    Resident: Cade Serrato MD (PGY-4)    ____________________________________________    CC: Derm Problem (FBSE ; new start of infliximab needs yearly FBSE)    HPI:  Mr. Karlos Terrazas is a(n) 38 year old male who presents today as a new patient for skin check. Started infliximab 6 months ago for Crohn's disease which has been very helpful. No personal history of skin cancer, but does report family history of melanoma in his mom (age ~60) and paternal grandmother on their  legs. Patient is otherwise feeling well, without additional skin concerns.    Labs Reviewed:  N/A    Physical Exam:  Vitals: There were no vitals taken for this visit.  SKIN: Full skin, which includes the head/face, both arms, chest, back, abdomen,both legs, genitalia and/or groin buttocks, digits and/or nails, was examined.  - There are dome shaped bright red papules on the trunk.   - Multiple regular light tan to brown pigmented macules and papules are identified on the trunk and extremities (including left toes)  - No other lesions of concern on areas examined.     Medications:  Current Outpatient Medications   Medication    cetirizine (ZYRTEC) 10 MG tablet    fluticasone (FLONASE ALLERGY RELIEF) 50 MCG/ACT nasal spray     No current facility-administered medications for this visit.      Past Medical History:   Patient Active Problem List   Diagnosis    Appendicitis, unspecified appendicitis type    Acute appendicitis with localized peritonitis and gangrene, without perforation or abscess    Crohn's disease (H)    Anemia, iron deficiency     History reviewed. No pertinent past medical history.    CC Lazaro Leo MD  [address] [place] [address] on close of this encounter.      Attestation signed by Jesse Smith MD at 12/5/2023  4:10 PM:  I have personally examined this patient and agree with the resident doctor's documentation and plan of care. I have reviewed and amended the resident's note. The documentation accurately reflects my clinical observations, diagnoses, treatment and follow-up plans.     Jesse Smith MD  Dermatology Staff

## 2023-12-12 ENCOUNTER — ANCILLARY PROCEDURE (OUTPATIENT)
Dept: MRI IMAGING | Facility: CLINIC | Age: 38
End: 2023-12-12
Attending: INTERNAL MEDICINE
Payer: COMMERCIAL

## 2023-12-12 DIAGNOSIS — K50.919 CROHN'S DISEASE WITH COMPLICATION, UNSPECIFIED GASTROINTESTINAL TRACT LOCATION (H): ICD-10-CM

## 2023-12-12 PROCEDURE — A9585 GADOBUTROL INJECTION: HCPCS | Mod: JZ | Performed by: STUDENT IN AN ORGANIZED HEALTH CARE EDUCATION/TRAINING PROGRAM

## 2023-12-12 PROCEDURE — 74183 MRI ABD W/O CNTR FLWD CNTR: CPT | Performed by: STUDENT IN AN ORGANIZED HEALTH CARE EDUCATION/TRAINING PROGRAM

## 2023-12-12 PROCEDURE — 72197 MRI PELVIS W/O & W/DYE: CPT | Performed by: STUDENT IN AN ORGANIZED HEALTH CARE EDUCATION/TRAINING PROGRAM

## 2023-12-12 RX ORDER — GADOBUTROL 604.72 MG/ML
7.5 INJECTION INTRAVENOUS ONCE
OUTPATIENT
Start: 2023-12-12 | End: 2023-12-12

## 2023-12-12 RX ORDER — GADOBUTROL 604.72 MG/ML
7.5 INJECTION INTRAVENOUS ONCE
Status: COMPLETED | OUTPATIENT
Start: 2023-12-12 | End: 2023-12-12

## 2023-12-12 RX ADMIN — GADOBUTROL 7 ML: 604.72 INJECTION INTRAVENOUS at 09:58

## 2023-12-18 NOTE — PROGRESS NOTES
IBD CLINIC VISIT-RETURN    CC/REFERRING MD:  Lazaro Leo  REASON FOR CONSULTATION: initially for concern for Crohn's based on colonoscopy 4/2023    ASSESSMENT/PLAN    1. Moderate to severe Crohn's ileitis (multifocal with structuring) and colitis (histologically only).   Current medication: infliximab 5mg/kg every 8 weeks - initiated 6/20/2023   Current clinical disease activity: HBI 0   Last endoscopic disease activity: colonoscopy 4/2023 IC valve is open; however, advancing the scope beyond ~ 3 cm into the terminal ileum was not possible due to either stricture or extrinsic compression.  Biopsies show active ileitis with granulomas and patchy active colitis on random colonic biopsies. MRE 5/2023 active small bowel inflammation with narrowing. Repeat MRE 12/2023 without any significant changes from MRE 5/2023.     Increasing dose due to MRE findings,  increase infliximab to 8 mg/kg every 8 weeks.  We will get pre authorization from insurance  [facility] pharmacy meeting  He has seen dermatology  Repeat MRE in 5 months  Follow up in 6 months  PCP referral placed    Return to clinic in 6 months    Pt seen and discussed with Dr. Suresh.    Patti Boles MD, PhD  Gastroenterology Fellow    IBD HPI:   He says he is doing quite well with his symptoms. He still gets mild tenderness and pain in his R abdomen. But this is nothing like the pain before he was on the remicade. He is just having some soreness in lower right and he thinks it feels like things might be a bit inflamed. But it has seemed to improve. He is not having many liquid or soft stools now, and prior he had quite a few. Denies blood in stools.     He is having mild fatigue, but he says he has children so this might be contributing.     He thinks the burning in his feet bilaterally has improved since starting the remicade. He thinks it was there prior to the remicade infusion.     IBD HISTORY  Age at diagnosis: 37 (2023)  Extent of endoscopic  disease: mainly small intestine but histologic colon as well on random biopsies  Extent of histologic disease: inflammatory/stricturing  Prior IBD Medications: none    Current IBD Medications: infliximab    Most Recent Endoscopy and Results:   Colonoscopy 4/7/23        HBI:  Overall patient well being (prior day): 0 (Very well)  Abdominal pain (prior day): 0 (None)  Number of liquid or soft stools (prior day): 0 (1 point per stool)  Abdominal mass on exam: 0 (None)  Complications (1 point for each): 0  Neuropathy: bottom of feet achy burny but improving    Remission <5  Mild activity 5-7  Moderate activity 8-16  Severe > 16    Extraintestinal manifestations (anytime during the course of disease)  L3: ileocolonic     DRUG MONITORING    Biologic concentration: Infliximab 9/27/2023, concentration 7.7, antibodies < 3.1    [place] Classification  AGE AT DIAGNOSIS: A2 between 17 and 40 y  CURRENT DISEASE LOCATION: L3: ileocolonic  L4 upper GI: NO  DISEASE BEHAVIOR (since disease onset): B2: stricturing  Perianal disease: NO    Constitutional symptoms:  Fever NO  Weight loss NO    Other GI symptoms present : none        PHYSICAL EXAMINATION:  Constitutional: aaox3, cooperative, pleasant, not dyspneic/diaphoretic, no acute distress  Vitals reviewed: /68   Pulse 104   Ht 1.829 m (6')   Wt 64.7 kg (142 lb 11.2 oz)   SpO2 100%   BMI 19.35 kg/m    Wt:   Wt Readings from Last 2 Encounters:   12/19/23 64.7 kg (142 lb 11.2 oz)   09/27/23 66.7 kg (147 lb)      Eyes: Sclera anicteric/injected  Ears/nose/mouth/throat: MMM  Neck: supple  CV: No edema  Respiratory: Unlabored breathing  Lymph: No axillary, submandibular, supraclavicular or inguinal lymphadenopathy  Abd:  Nondistended, soft, no hepatosplenomegaly, nontender, no peritoneal signs  Skin: warm, perfused, no jaundice  Psych: Normal affect  MSK: Normal gait    LABS:  11/2023  CRP 9.06   LFT WNL  Hgb 12.5 (L)  MCV 84   Plt 545  Diff normal  ESR 21  Note Iron  previously low    IMAGING:  MR Enterography 12/12/23  EXAMINATION: MR ENTEROGRAPHY W/O AND W CONTRAST, 12/12/2023 10:12 AM     TECHNIQUE:  Multiplanar, multisequence MRI imaging of the abdomen and  pelvis was obtained using MR enterography protocol without and with  intravenous gadolinium. Contrast dose: 7 mL Gadavist     COMPARISON: 5/3/2023     HISTORY: please assess for Crohn's activity; Crohn's disease with  complication, unspecified gastrointestinal tract location (H)     FINDINGS:     Lower thorax: The lung bases are clear.     Abdomen and Pelvis:   There is approximately 20 cm of mucosal hyperenhancement with  associated bowel wall thickening in the distal ileum (series 18 image  11 through 28). Additionally, there is associated luminal narrowing  with these regions of small bowel inflammation. No evidence of abscess  or fistula. The large bowel is unremarkable. There is a large colonic  stool burden. No evidence of bowel obstruction. There is some mildly  hyperenhancing bowel in the left upper abdomen, however this is likely  related to bowel underdistention rather than acute inflammation.     The liver, spleen, pancreas, adrenal glands, and kidneys are  unremarkable. Tiny simple left renal cortical cysts. Layering biliary  sludge. There are multiple prominent in mildly enlarged mesenteric  lymph nodes, which are predominantly seen in the right lower quadrant  and are likely reactive.     Bones: No suspicious or aggressive appearing bone lesions. T12  vertebral body hemangioma.                                                                      IMPRESSION:  1. Long segment of acute inflammation of the ileum with associated  luminal narrowing, which is not significantly changed compared to  prior exam of 5/3/2023.   2. Mild mesenteric lymphadenopathy, likely reactive.    PERTINENT PAST MEDICAL HISTORY:  No past medical history on file.    PREVIOUS SURGERIES:  Past Surgical History:   Procedure Laterality  Date    COLONOSCOPY N/A 4/7/2023    Procedure: COLONOSCOPY, WITH BIOPSY;  Surgeon: Lamont Seaman MD;  Location: [facility] OR    LAPAROSCOPIC APPENDECTOMY N/A 2/22/2023    Procedure: DIAGNOSTIC LAPAROSCOPY, LYSIS OF ADHESION, DRAIN PLACEMENT;  Surgeon: Fabiola Miranda MD;  Location: [facility] OR       ALLERGIES:   No Known Allergies    PERTINENT MEDICATIONS:    Current Outpatient Medications:     cetirizine (ZYRTEC) 10 MG tablet, Take 10 mg by mouth daily (Patient not taking: Reported on 12/19/2023), Disp: , Rfl:     fluticasone (FLONASE ALLERGY RELIEF) 50 MCG/ACT nasal spray, Spray 1 spray into both nostrils daily (Patient not taking: Reported on 12/19/2023), Disp: , Rfl:     SOCIAL HISTORY:  Social History     Socioeconomic History    Marital status:      Spouse name: Not on file    Number of children: Not on file    Years of education: Not on file    Highest education level: Not on file   Occupational History    Not on file   Tobacco Use    Smoking status: Never    Smokeless tobacco: Never   Vaping Use    Vaping Use: Never used   Substance and Sexual Activity    Alcohol use: Yes     Comment: occasional use    Drug use: Yes     Types: Marijuana    Sexual activity: Yes     Partners: Female   Other Topics Concern    Parent/sibling w/ CABG, MI or angioplasty before 65F 55M? No   Social History Narrative    Not on file     Social Determinants of Health     Financial Resource Strain: Not on file   Food Insecurity: Not on file   Transportation Needs: Not on file   Physical Activity: Not on file   Stress: Not on file   Social Connections: Not on file   Interpersonal Safety: Not on file   Housing Stability: Not on file       FAMILY HISTORY:  Family History   Problem Relation Age of Onset    Melanoma Mother     Skin Cancer Mother     Skin Cancer Father     Skin Cancer Maternal Grandmother     Melanoma Paternal Grandmother        Past/family/social history reviewed and no changes

## 2023-12-19 ENCOUNTER — LAB (OUTPATIENT)
Dept: LAB | Facility: CLINIC | Age: 38
End: 2023-12-19
Payer: COMMERCIAL

## 2023-12-19 ENCOUNTER — OFFICE VISIT (OUTPATIENT)
Dept: GASTROENTEROLOGY | Facility: CLINIC | Age: 38
End: 2023-12-19
Payer: COMMERCIAL

## 2023-12-19 VITALS
BODY MASS INDEX: 19.33 KG/M2 | HEART RATE: 104 BPM | SYSTOLIC BLOOD PRESSURE: 124 MMHG | WEIGHT: 142.7 LBS | HEIGHT: 72 IN | OXYGEN SATURATION: 100 % | DIASTOLIC BLOOD PRESSURE: 68 MMHG

## 2023-12-19 DIAGNOSIS — K50.90 CROHN'S DISEASE (H): ICD-10-CM

## 2023-12-19 DIAGNOSIS — K50.919 CROHN'S DISEASE WITH COMPLICATION, UNSPECIFIED GASTROINTESTINAL TRACT LOCATION (H): Primary | ICD-10-CM

## 2023-12-19 LAB
ALBUMIN SERPL BCG-MCNC: 4.3 G/DL (ref 3.5–5.2)
ALP SERPL-CCNC: 86 U/L (ref 40–150)
ALT SERPL W P-5'-P-CCNC: 5 U/L (ref 0–70)
AST SERPL W P-5'-P-CCNC: 16 U/L (ref 0–45)
BASOPHILS # BLD AUTO: 0.1 10E3/UL (ref 0–0.2)
BASOPHILS NFR BLD AUTO: 1 %
BILIRUB DIRECT SERPL-MCNC: <0.2 MG/DL (ref 0–0.3)
BILIRUB SERPL-MCNC: 0.2 MG/DL
CRP SERPL-MCNC: 13.8 MG/L
EOSINOPHIL # BLD AUTO: 0.2 10E3/UL (ref 0–0.7)
EOSINOPHIL NFR BLD AUTO: 3 %
ERYTHROCYTE [DISTWIDTH] IN BLOOD BY AUTOMATED COUNT: 14.8 % (ref 10–15)
ERYTHROCYTE [SEDIMENTATION RATE] IN BLOOD BY WESTERGREN METHOD: 15 MM/HR (ref 0–15)
HCT VFR BLD AUTO: 35.6 % (ref 40–53)
HGB BLD-MCNC: 11.6 G/DL (ref 13.3–17.7)
IMM GRANULOCYTES # BLD: 0 10E3/UL
IMM GRANULOCYTES NFR BLD: 0 %
LYMPHOCYTES # BLD AUTO: 1.3 10E3/UL (ref 0.8–5.3)
LYMPHOCYTES NFR BLD AUTO: 19 %
MCH RBC QN AUTO: 26.7 PG (ref 26.5–33)
MCHC RBC AUTO-ENTMCNC: 32.6 G/DL (ref 31.5–36.5)
MCV RBC AUTO: 82 FL (ref 78–100)
MONOCYTES # BLD AUTO: 0.7 10E3/UL (ref 0–1.3)
MONOCYTES NFR BLD AUTO: 10 %
NEUTROPHILS # BLD AUTO: 4.5 10E3/UL (ref 1.6–8.3)
NEUTROPHILS NFR BLD AUTO: 67 %
NRBC # BLD AUTO: 0 10E3/UL
NRBC BLD AUTO-RTO: 0 /100
PLATELET # BLD AUTO: 341 10E3/UL (ref 150–450)
PROT SERPL-MCNC: 7.2 G/DL (ref 6.4–8.3)
RBC # BLD AUTO: 4.34 10E6/UL (ref 4.4–5.9)
WBC # BLD AUTO: 6.7 10E3/UL (ref 4–11)

## 2023-12-19 PROCEDURE — 36415 COLL VENOUS BLD VENIPUNCTURE: CPT | Performed by: PATHOLOGY

## 2023-12-19 PROCEDURE — 85025 COMPLETE CBC W/AUTO DIFF WBC: CPT | Performed by: PATHOLOGY

## 2023-12-19 PROCEDURE — 86140 C-REACTIVE PROTEIN: CPT | Performed by: PATHOLOGY

## 2023-12-19 PROCEDURE — 80076 HEPATIC FUNCTION PANEL: CPT | Performed by: PATHOLOGY

## 2023-12-19 PROCEDURE — 85652 RBC SED RATE AUTOMATED: CPT | Performed by: PATHOLOGY

## 2023-12-19 PROCEDURE — 99214 OFFICE O/P EST MOD 30 MIN: CPT | Mod: GC | Performed by: STUDENT IN AN ORGANIZED HEALTH CARE EDUCATION/TRAINING PROGRAM

## 2023-12-19 ASSESSMENT — PAIN SCALES - GENERAL: PAINLEVEL: NO PAIN (0)

## 2023-12-19 NOTE — LETTER
12/19/2023         RE: Karlos Terrazas  [address] [facility] [address]        Dear Colleague,    Thank you for referring your patient, Karlos Terrazas, to the [facility] GASTROENTEROLOGY CLINIC [place]. Please see a copy of my visit note below.    IBD CLINIC VISIT-RETURN    CC/REFERRING MD:  Lazaro Leo  REASON FOR CONSULTATION: initially for concern for Crohn's based on colonoscopy 4/2023    ASSESSMENT/PLAN    1. Moderate to severe Crohn's ileitis (multifocal with structuring) and colitis (histologically only).   Current medication: infliximab 5mg/kg every 8 weeks - initiated 6/20/2023   Current clinical disease activity: HBI 0   Last endoscopic disease activity: colonoscopy 4/2023 IC valve is open; however, advancing the scope beyond ~ 3 cm into the terminal ileum was not possible due to either stricture or extrinsic compression.  Biopsies show active ileitis with granulomas and patchy active colitis on random colonic biopsies. MRE 5/2023 active small bowel inflammation with narrowing. Repeat MRE 12/2023 without any significant changes from MRE 5/2023.     Increasing dose due to MRE findings,  increase infliximab to 8 mg/kg every 8 weeks.  We will get pre authorization from insurance  [facility] pharmacy meeting  He has seen dermatology  Repeat MRE in 5 months  Follow up in 6 months  PCP referral placed    Return to clinic in 6 months    Pt seen and discussed with Dr. Suresh.    Patti Boles MD, PhD  Gastroenterology Fellow    IBD HPI:   He says he is doing quite well with his symptoms. He still gets mild tenderness and pain in his R abdomen. But this is nothing like the pain before he was on the remicade. He is just having some soreness in lower right and he thinks it feels like things might be a bit inflamed. But it has seemed to improve. He is not having many liquid or soft stools now, and prior he had quite a few. Denies blood in stools.     He is having mild fatigue, but he  says he has children so this might be contributing.     He thinks the burning in his feet bilaterally has improved since starting the remicade. He thinks it was there prior to the remicade infusion.     IBD HISTORY  Age at diagnosis: 37 (2023)  Extent of endoscopic disease: mainly small intestine but histologic colon as well on random biopsies  Extent of histologic disease: inflammatory/stricturing  Prior IBD Medications: none    Current IBD Medications: infliximab    Most Recent Endoscopy and Results:   Colonoscopy 4/7/23        HBI:  Overall patient well being (prior day): 0 (Very well)  Abdominal pain (prior day): 0 (None)  Number of liquid or soft stools (prior day): 0 (1 point per stool)  Abdominal mass on exam: 0 (None)  Complications (1 point for each): 0  Neuropathy: bottom of feet achy burny but improving    Remission <5  Mild activity 5-7  Moderate activity 8-16  Severe > 16    Extraintestinal manifestations (anytime during the course of disease)  L3: ileocolonic     DRUG MONITORING    Biologic concentration: Infliximab 9/27/2023, concentration 7.7, antibodies < 3.1    [place] Classification  AGE AT DIAGNOSIS: A2 between 17 and 40 y  CURRENT DISEASE LOCATION: L3: ileocolonic  L4 upper GI: NO  DISEASE BEHAVIOR (since disease onset): B2: stricturing  Perianal disease: NO    Constitutional symptoms:  Fever NO  Weight loss NO    Other GI symptoms present : none        PHYSICAL EXAMINATION:  Constitutional: aaox3, cooperative, pleasant, not dyspneic/diaphoretic, no acute distress  Vitals reviewed: /68   Pulse 104   Ht 1.829 m (6')   Wt 64.7 kg (142 lb 11.2 oz)   SpO2 100%   BMI 19.35 kg/m    Wt:   Wt Readings from Last 2 Encounters:   12/19/23 64.7 kg (142 lb 11.2 oz)   09/27/23 66.7 kg (147 lb)      Eyes: Sclera anicteric/injected  Ears/nose/mouth/throat: MMM  Neck: supple  CV: No edema  Respiratory: Unlabored breathing  Lymph: No axillary, submandibular, supraclavicular or inguinal  lymphadenopathy  Abd:  Nondistended, soft, no hepatosplenomegaly, nontender, no peritoneal signs  Skin: warm, perfused, no jaundice  Psych: Normal affect  MSK: Normal gait    LABS:  11/2023  CRP 9.06   LFT WNL  Hgb 12.5 (L)  MCV 84   Plt 545  Diff normal  ESR 21  Note Iron previously low    IMAGING:  MR Enterography 12/12/23  EXAMINATION: MR ENTEROGRAPHY W/O AND W CONTRAST, 12/12/2023 10:12 AM     TECHNIQUE:  Multiplanar, multisequence MRI imaging of the abdomen and  pelvis was obtained using MR enterography protocol without and with  intravenous gadolinium. Contrast dose: 7 mL Gadavist     COMPARISON: 5/3/2023     HISTORY: please assess for Crohn's activity; Crohn's disease with  complication, unspecified gastrointestinal tract location (H)     FINDINGS:     Lower thorax: The lung bases are clear.     Abdomen and Pelvis:   There is approximately 20 cm of mucosal hyperenhancement with  associated bowel wall thickening in the distal ileum (series 18 image  11 through 28). Additionally, there is associated luminal narrowing  with these regions of small bowel inflammation. No evidence of abscess  or fistula. The large bowel is unremarkable. There is a large colonic  stool burden. No evidence of bowel obstruction. There is some mildly  hyperenhancing bowel in the left upper abdomen, however this is likely  related to bowel underdistention rather than acute inflammation.     The liver, spleen, pancreas, adrenal glands, and kidneys are  unremarkable. Tiny simple left renal cortical cysts. Layering biliary  sludge. There are multiple prominent in mildly enlarged mesenteric  lymph nodes, which are predominantly seen in the right lower quadrant  and are likely reactive.     Bones: No suspicious or aggressive appearing bone lesions. T12  vertebral body hemangioma.                                                                      IMPRESSION:  1. Long segment of acute inflammation of the ileum with associated  luminal  narrowing, which is not significantly changed compared to  prior exam of 5/3/2023.   2. Mild mesenteric lymphadenopathy, likely reactive.    PERTINENT PAST MEDICAL HISTORY:  No past medical history on file.    PREVIOUS SURGERIES:  Past Surgical History:   Procedure Laterality Date    COLONOSCOPY N/A 4/7/2023    Procedure: COLONOSCOPY, WITH BIOPSY;  Surgeon: Lamont Seaman MD;  Location: [facility] OR    LAPAROSCOPIC APPENDECTOMY N/A 2/22/2023    Procedure: DIAGNOSTIC LAPAROSCOPY, LYSIS OF ADHESION, DRAIN PLACEMENT;  Surgeon: Fabiola Miranda MD;  Location: [facility] OR       ALLERGIES:   No Known Allergies    PERTINENT MEDICATIONS:    Current Outpatient Medications:     cetirizine (ZYRTEC) 10 MG tablet, Take 10 mg by mouth daily (Patient not taking: Reported on 12/19/2023), Disp: , Rfl:     fluticasone (FLONASE ALLERGY RELIEF) 50 MCG/ACT nasal spray, Spray 1 spray into both nostrils daily (Patient not taking: Reported on 12/19/2023), Disp: , Rfl:     SOCIAL HISTORY:  Social History     Socioeconomic History    Marital status:      Spouse name: Not on file    Number of children: Not on file    Years of education: Not on file    Highest education level: Not on file   Occupational History    Not on file   Tobacco Use    Smoking status: Never    Smokeless tobacco: Never   Vaping Use    Vaping Use: Never used   Substance and Sexual Activity    Alcohol use: Yes     Comment: occasional use    Drug use: Yes     Types: Marijuana    Sexual activity: Yes     Partners: Female   Other Topics Concern    Parent/sibling w/ CABG, MI or angioplasty before 65F 55M? No   Social History Narrative    Not on file     Social Determinants of Health     Financial Resource Strain: Not on file   Food Insecurity: Not on file   Transportation Needs: Not on file   Physical Activity: Not on file   Stress: Not on file   Social Connections: Not on file   Interpersonal Safety: Not on file   Housing Stability: Not on file       FAMILY HISTORY:  Family  History   Problem Relation Age of Onset    Melanoma Mother     Skin Cancer Mother     Skin Cancer Father     Skin Cancer Maternal Grandmother     Melanoma Paternal Grandmother        Past/family/social history reviewed and no changes    Attestation signed by Priscilla Suresh MD at 12/22/2023 12:19 PM:  I performed a history and physical examination of the above patient and discussed the management with Dr. Boles on 12/19/2023. I reviewed the note and there are no changes to the past medical, family or social history.  A complete 10 point review of systems was obtained. Please see the HPI for pertinent positives and negatives. All other systems were reviewed and were found to be negative.     I agree with the documented findings and plan of care as outlined.        Again, thank you for allowing me to participate in the care of your patient.      Sincerely,    Patti Boles MD

## 2023-12-19 NOTE — PATIENT INSTRUCTIONS
Dear Karlos    It was nice to see you.  Our plans are to increase your remicade given your MR enterography findings of persistent inflammation of the distal portion of the ileum.    We will repeat MR-enterography in 3-6 months.    Monitor the burning in your feet. This could be a side-effect of the remicade.     If you have any questions, please do not hesitate to contact our nurse, Jane, at [phone]. Fax # [phone].    Follow up in 6 months.    Sincerely,   Dr NIX

## 2023-12-19 NOTE — NURSING NOTE
Chief Complaint   Patient presents with    Follow Up       Vitals:    12/19/23 1452   BP: 124/68   Pulse: 104   SpO2: 100%   Weight: 64.7 kg (142 lb 11.2 oz)   Height: 1.829 m (6')       Body mass index is 19.35 kg/m .    Liya Graham

## 2023-12-20 ENCOUNTER — TELEPHONE (OUTPATIENT)
Dept: GASTROENTEROLOGY | Facility: CLINIC | Age: 38
End: 2023-12-20
Payer: COMMERCIAL

## 2023-12-20 NOTE — TELEPHONE ENCOUNTER
----- Message from Rona Barboza RN sent at 12/20/2023  8:41 AM CST -----  Regarding: RE: OPTIMIZE 111-03  Kaz,     Are you able to update the therapy plan for the 8mg/kg for us?    Thanks!  Rona   ----- Message -----  From: Tramaine Rolle  Sent: 12/19/2023   3:42 PM CST  To: Jill Astudillo RN; #  Subject: OPTIMIZE 111-03                                  HiKaz,  We have the forecast back for the next infusion. I believe he has been scheduled 8 weeks from the last infusion which is in later January. The forecast is showing that the dose for this next January infusion needs to be 8 mg/kg.     Would you please help to update the note to make sure his next home infusion is to the updated dosage?     Please let me know if you need any further information.     Best  [place]

## 2023-12-28 ENCOUNTER — TELEPHONE (OUTPATIENT)
Dept: GASTROENTEROLOGY | Facility: CLINIC | Age: 38
End: 2023-12-28
Payer: COMMERCIAL

## 2023-12-28 NOTE — TELEPHONE ENCOUNTER
Left Voicemail (1st Attempt) for the patient to call back and schedule the following:    Appointment type: RET IBD   Provider:    Return date: 6 mo ( 06/19/2024 )   Specialty phone number: [phone]  Additional appointment(s) needed: no  Additonal Notes: no

## 2024-01-16 ENCOUNTER — LAB (OUTPATIENT)
Dept: LAB | Facility: CLINIC | Age: 39
End: 2024-01-16
Payer: COMMERCIAL

## 2024-01-16 ENCOUNTER — VIRTUAL VISIT (OUTPATIENT)
Dept: GASTROENTEROLOGY | Facility: CLINIC | Age: 39
End: 2024-01-16
Attending: INTERNAL MEDICINE
Payer: COMMERCIAL

## 2024-01-16 DIAGNOSIS — K50.919 CROHN'S DISEASE WITH COMPLICATION, UNSPECIFIED GASTROINTESTINAL TRACT LOCATION (H): Primary | ICD-10-CM

## 2024-01-16 DIAGNOSIS — K50.90 CROHN'S DISEASE (H): ICD-10-CM

## 2024-01-16 LAB
ALBUMIN SERPL BCG-MCNC: 4.5 G/DL (ref 3.5–5.2)
ALP SERPL-CCNC: 90 U/L (ref 40–150)
ALT SERPL W P-5'-P-CCNC: 8 U/L (ref 0–70)
AST SERPL W P-5'-P-CCNC: 18 U/L (ref 0–45)
BASOPHILS # BLD AUTO: 0.1 10E3/UL (ref 0–0.2)
BASOPHILS NFR BLD AUTO: 1 %
BILIRUB DIRECT SERPL-MCNC: <0.2 MG/DL (ref 0–0.3)
BILIRUB SERPL-MCNC: 0.3 MG/DL
CRP SERPL-MCNC: 5.78 MG/L
EOSINOPHIL # BLD AUTO: 0.2 10E3/UL (ref 0–0.7)
EOSINOPHIL NFR BLD AUTO: 3 %
ERYTHROCYTE [DISTWIDTH] IN BLOOD BY AUTOMATED COUNT: 14.5 % (ref 10–15)
ERYTHROCYTE [SEDIMENTATION RATE] IN BLOOD BY WESTERGREN METHOD: 16 MM/HR (ref 0–15)
HCT VFR BLD AUTO: 38.6 % (ref 40–53)
HGB BLD-MCNC: 12.2 G/DL (ref 13.3–17.7)
IMM GRANULOCYTES # BLD: 0 10E3/UL
IMM GRANULOCYTES NFR BLD: 0 %
LYMPHOCYTES # BLD AUTO: 2.2 10E3/UL (ref 0.8–5.3)
LYMPHOCYTES NFR BLD AUTO: 33 %
MCH RBC QN AUTO: 26.2 PG (ref 26.5–33)
MCHC RBC AUTO-ENTMCNC: 31.6 G/DL (ref 31.5–36.5)
MCV RBC AUTO: 83 FL (ref 78–100)
MONOCYTES # BLD AUTO: 0.6 10E3/UL (ref 0–1.3)
MONOCYTES NFR BLD AUTO: 9 %
NEUTROPHILS # BLD AUTO: 3.6 10E3/UL (ref 1.6–8.3)
NEUTROPHILS NFR BLD AUTO: 54 %
NRBC # BLD AUTO: 0 10E3/UL
NRBC BLD AUTO-RTO: 0 /100
PLATELET # BLD AUTO: 404 10E3/UL (ref 150–450)
PROT SERPL-MCNC: 7.6 G/DL (ref 6.4–8.3)
RBC # BLD AUTO: 4.65 10E6/UL (ref 4.4–5.9)
WBC # BLD AUTO: 6.7 10E3/UL (ref 4–11)

## 2024-01-16 PROCEDURE — 85025 COMPLETE CBC W/AUTO DIFF WBC: CPT | Performed by: PATHOLOGY

## 2024-01-16 PROCEDURE — 80076 HEPATIC FUNCTION PANEL: CPT | Performed by: PATHOLOGY

## 2024-01-16 PROCEDURE — 36415 COLL VENOUS BLD VENIPUNCTURE: CPT | Performed by: PATHOLOGY

## 2024-01-16 PROCEDURE — 86140 C-REACTIVE PROTEIN: CPT | Performed by: PATHOLOGY

## 2024-01-16 PROCEDURE — 85652 RBC SED RATE AUTOMATED: CPT | Performed by: PATHOLOGY

## 2024-01-16 NOTE — PROGRESS NOTES
Medication Therapy Management (MTM) Encounter    ASSESSMENT:                            Medication Adherence/Access: See below for considerations  -- will connect with [facility] to clarify concern    Crohn's Disease: Karlos would benefit from continuing infliximab 8 mg/kg every 8 weeks to maintain clinical remission, with goal of total remission. We discussed health maintenance today as noted below. Prevnar-20 and Shingrix series are indicated based on diagnosis of IBD on anti-TNF immunosuppression. He is also eligible for additional COVID-19 23/24 boosters, and the hepatitis A vaccine series if he is interested. He will be due for routine tuberculosis screening in May. This is a lab that home infusion is unable to draw, so will place future order to be completed at any [facility] lab.    PLAN:                            Alexandria to reach out to [facility] re: infusion.   -- Update: They note he is due tomorrow and will reach out to him to set this up.    Karlos to consider the following vaccines:  Prevnar-20  Shingrix series   -- will discuss these two with PCP  COVID-19 23/24 additional booster // Hepatitis A if desired    Will plan on quantiferon screening in May. Order placed to be completed at any [employer] lab.    Follow-up: 6 months for MTM, sooner if needed    SUBJECTIVE/OBJECTIVE:                          Karlos Terrazas is a 38 year old male contacted via secure video for a follow-up visit from 5/16/2023 with my colleague Zo Leo PharmD.       Reason for visit: IBD health maintenance     Allergies/ADRs: None  Tobacco: He reports that he has never smoked. He has never used smokeless tobacco.  Alcohol: Less than 1 beverages / week    Medication Adherence/Access:   -- unsure on status of next infusion    Crohn's disease:   Infliximab 8 mg/kg every 8 weeks   Ferrous bisglycinate three times weekly  Calcium/D daily     Notes the infusions are going well. Notes he was supposed to get a dose today, but was told  he can't get it today. He is part of the Optimize trial. No major questions or concerns. He is seeing primary care at the end of January and would be okay with me reaching out to them to discuss vaccination.    Last provider visit: 12/19/2023 with Duc Boles and Kerwin  Next provider visit: 7/2/2024 with Dr. Suresh  Last Quantiferon: 5/2023    Current clinical disease activity: HBI 0   Last endoscopic disease activity: colonoscopy 4/2023 IC valve is open; however, advancing the scope beyond ~ 3 cm into the terminal ileum was not possible due to either stricture or extrinsic compression.  Biopsies show active ileitis with granulomas and patchy active colitis on random colonic biopsies. MRE 5/2023 active small bowel inflammation with narrowing. Repeat MRE 12/2023 without any significant changes from MRE 5/2023.     12/2023 MRE         IMPRESSION:  1. Long segment of acute inflammation of the ileum with associated  luminal narrowing, which is not significantly changed compared to  prior exam of 5/3/2023.   2. Mild mesenteric lymphadenopathy, likely reactive.    IBD Health Care Maintenance:    Vaccinations:  All patients on biologics should avoid live vaccines unless specifically indicated.    -- Influenza (every year) 10/5/2023  -- TdaP (every 10 years) 8/29/2023  -- Pneumococcal Pneumonia    Prevnar-13: not on file   Pneumovax-23: not on file   Prevnar-20: not on file  -- COVID-19 4/13/2021, 5/4/2021, 11/30/2021, 1/12/2023 (bivalent), and 10/5/2023 (23/24)  -- Yearly assessment for latent Tb (verbal screening and exam, PPD or QuantiFERON-Tb testing)      -- negative 5/2023    One time confirmation of immunity or serologies:  -- Hepatitis A (serologies or immunizations) not on file   -- Hepatitis B (serologies or immunizations) 9/24/1998, 10/27/1998, and 4/22/1999 -- serologies 2023 indicate immunity  -- Varicella/Zoster    Varicella did have chickenpox    Zoster not on file     Due to the immunosuppression in  this patient, I would not advise administration of live vaccines such as varicella/VZV, intranasal influenza, MMR, or yellow fever vaccine (if traveling).      Pre-Biologic Screening:  -- Hep B Surface Antibody reactive 5/2023  -- Hep B Surface Antigen non-reactive 5/2023  -- Hep B Core Antibody non-reactive 5/2023  -- Hep C Antibody non-reactive 9/2023    Bone mineral density screening   -- Recommend all patients supplement with calcium and vitamin D    Cancer Screening:    Skin cancer screening: Annual visual exam of skin by dermatologist since patient is immunocompromised   -- completed 12/5/23, scheduled for next year    Depression Screening:    PHQ-2 Score:         1/16/2024    10:02 AM 8/10/2023     1:04 PM   PHQ-2 ( 1999 Pfizer)   Q1: Little interest or pleasure in doing things 0 0   Q2: Feeling down, depressed or hopeless 0 0   PHQ-2 Score 0 0     Research:  Are you interested in being contacted about enrollment in clinical research studies? Yes    Misc:  -- Avoid tobacco use  -- Avoid NSAIDs as there is potentially a 25% chance of causing an IBD flare    ----------------    I spent 32 minutes with this patient today. All changes were made via collaborative practice agreement with Priscilla Suresh. A copy of the visit note was provided to the patient's provider(s).    A summary of these recommendations was sent via [employer].    Alexandria Romero PharmD, BCACP  MTM Pharmacist   [facility]astroenter[facility]   Phone: [phone]    Telemedicine Visit Details  Type of service:  Video Conference via [employer]  Start Time:  9:33 AM  End Time: 10:05 AM     Medication Therapy Recommendations  Crohn's disease (H)    Rationale: Preventive therapy - Needs additional medication therapy - Indication   Recommendation: Start Medication - Prevnar 20 0.5 ML Heather   Status: Accepted per CPA

## 2024-01-16 NOTE — Clinical Note
1/16/2024         RE: Karlos Terrazas  [address] [address]        Dear Colleague,    Thank you for referring your patient, Karlos Terrazas, to the [facility]. Please see a copy of my visit note below.    Medication Therapy Management (MTM) Encounter    ASSESSMENT:                            Medication Adherence/Access: {adherencechoices:671900}    ***:   ***    PLAN:                            Alexandria to reach out to [facility] re: infusion  Karlos to consider   COVID-19 23/24 // Hepatitis A   Prevnar-20  Shingrix series   -- will discuss with PCP  Quantiferon screening in May     Follow-up: 6 months for MTM, sooner if needed    SUBJECTIVE/OBJECTIVE:                          Karlos Terrazas is a 38 year old male contacted via secure video for a follow-up visit from 5/16/2023 with my colleague Zo Leo PharmD. {mtmvisitdetails:381000}      Reason for visit: IBD health maintenance     Allergies/ADRs: None  Tobacco: He reports that he has never smoked. He has never used smokeless tobacco.  Alcohol: Less than 1 beverages / week    Medication Adherence/Access: {fumedadherence:870575}  --   {MTM SUBJECTIVE (Optional):344672}      ***:    8 mg/kg every 8 weeks     Notes the infusions are going well. Notes he was supposed to get a dose today, but was told he can't get it today. He is part of the Optimize trial.    IBD Health Care Maintenance:    Vaccinations:  All patients on biologics should avoid live vaccines unless specifically indicated.    -- Influenza (every year)  -- TdaP (every 10 years)  -- Pneumococcal Pneumonia    Prevnar-13:   Pneumovax-23:   Prevnar-20:  -- COVID-19  -- Yearly assessment for latent Tb (verbal screening and exam, PPD or QuantiFERON-Tb testing)      {result:474831:::1}    One time confirmation of immunity or serologies:  -- Hepatitis A (serologies or immunizations)  -- Hepatitis B (serologies or immunizations)  -- Varicella/Zoster    Varicella did  have chickenpox    Zoster not on file     Due to the immunosuppression in this patient, I would not advise administration of live vaccines such as varicella/VZV, intranasal influenza, MMR, or yellow fever vaccine (if traveling).      Pre-Biologic Screening:  -- Hep B Surface Antibody {ResultsSerologies:596105}  -- Hep B Surface Antigen {ResultsSerologies:510123}  -- Hep B Core Antibody {ResultsSerologies:748872}  -- Hep C Antibody {ResultsSerologies:305197}    Bone mineral density screening   -- Recommend all patients supplement with calcium and vitamin D    Cancer Screening:  Colon cancer screening:  Given ***, recommend patient undergo regular dysplasia surveillance   Next dysplasia screening is recommended ***.    Skin cancer screening: Annual visual exam of skin by dermatologist since patient is immunocompromised    Depression Screening:    PHQ-2 Score:         1/16/2024    10:02 AM 8/10/2023     1:04 PM   PHQ-2 ( 1999 Pfizer)   Q1: Little interest or pleasure in doing things 0 0   Q2: Feeling down, depressed or hopeless 0 0   PHQ-2 Score 0 0     Research:  Are you interested in being contacted about enrollment in clinical research studies? Yes    Misc:  -- Avoid tobacco use  -- Avoid NSAIDs as there is potentially a 25% chance of causing an IBD flare    ----------------  {SARAI?:038105}    I spent 32 minutes with this patient today. All changes were made via collaborative practice agreement with Priscilla Suresh. A copy of the visit note was provided to the patient's provider(s).    A summary of these recommendations was sent via [employer].    Alexandria EllingtonD, BCACP  MTM Pharmacist   [facility]astroenterology   Phone: [phone]    Telemedicine Visit Details  Type of service:  Video Conference via [employer]  Start Time:  9:33 AM  End Time: 10:05 AM     Medication Therapy Recommendations  No medication therapy recommendations to display       Again, thank you for allowing me to participate in the care  of your patient.        Sincerely,        Alexandria Romero RP

## 2024-01-16 NOTE — Clinical Note
Hi Dr. Velasquez, I met with Karlos for a review of his health maintenance in the setting of IBD on immunosuppression. I just wanted to give you a heads up that he may bring up vaccines at your appointment later this week. We would recommend Prevnar-20 as well as the Shingrix series. For the 19+ on immunosuppression group, I have seen a mix between clinic/pharmacy coverage for the Shingrix series. If he has concerns about coverage when he talks to you, let me know and I can work with him to figure out if this has to be given at a pharmacy per his plan. Please let me know if you have any questions. Thank you! Alexandria

## 2024-01-17 ENCOUNTER — DOCUMENTATION ONLY (OUTPATIENT)
Dept: PHARMACY | Facility: CLINIC | Age: 39
End: 2024-01-17
Payer: COMMERCIAL

## 2024-01-17 NOTE — PROGRESS NOTES
Skilled Nurse visit in the Patient Home to administer 500mg IV Remicade.  No recent elevated temperature, fever, chills, productive cough, coughing for 3 weeks or longer or hemoptysis, abnormal vital signs, night sweats, chest pain. No  decrease in your appetite, unexplained weight loss or fatigue.  No other new onset medical symptoms.  Current weight 149lbs.  Peripheral IVleft Upper forearm, 1attempt. Labs drawn in clinic. Infusion completed without complication or reaction. Pt reports therapy iseffective in managing symptoms related to therapy.    Cherie Peng, RN, BSN   [facility]  [email or web address]

## 2024-01-21 NOTE — PATIENT INSTRUCTIONS
"Recommendations from today's MTM visit:                                                       Alexandria to reach out to [facility] re: infusion.   -- Update: They note you are due tomorrow and will reach out to you to set this up.    Karlos to consider the following vaccines:  Prevnar-20 (for pneumonia)  Shingrix series (for Shingles)  COVID-19 23/24 additional booster // Hepatitis A if desired    Will plan on quantiferon screening in May. Order placed to be completed at any [facility].    Follow-up: 6 months for MTM, sooner if needed    It was great speaking with you today.  I value your experience and would be very thankful for your time in providing feedback in our clinic survey. In the next few days, you may receive an email or text message from [employer] with a link to a survey related to your  clinical pharmacist.\"     To schedule another MTM appointment, please call the clinic directly or you may call the MTM scheduling line at [phone].    My Clinical Pharmacist's contact information:                                                      Please feel free to contact me with any questions or concerns you have.      Alexandria Romero PharmD, BCACP  MTM Pharmacist   [facility] Gastroenterology   Phone: [phone]    "

## 2024-01-26 ENCOUNTER — OFFICE VISIT (OUTPATIENT)
Dept: FAMILY MEDICINE | Facility: CLINIC | Age: 39
End: 2024-01-26
Payer: COMMERCIAL

## 2024-01-26 ENCOUNTER — MYC MEDICAL ADVICE (OUTPATIENT)
Dept: FAMILY MEDICINE | Facility: CLINIC | Age: 39
End: 2024-01-26

## 2024-01-26 VITALS
RESPIRATION RATE: 16 BRPM | HEART RATE: 79 BPM | WEIGHT: 147.4 LBS | TEMPERATURE: 98.3 F | DIASTOLIC BLOOD PRESSURE: 78 MMHG | OXYGEN SATURATION: 100 % | SYSTOLIC BLOOD PRESSURE: 118 MMHG | BODY MASS INDEX: 19.99 KG/M2

## 2024-01-26 DIAGNOSIS — R00.0 TACHYCARDIA: ICD-10-CM

## 2024-01-26 DIAGNOSIS — Z00.00 ROUTINE GENERAL MEDICAL EXAMINATION AT A HEALTH CARE FACILITY: ICD-10-CM

## 2024-01-26 DIAGNOSIS — D50.0 IRON DEFICIENCY ANEMIA DUE TO CHRONIC BLOOD LOSS: ICD-10-CM

## 2024-01-26 DIAGNOSIS — H53.8 BLURRED VISION: ICD-10-CM

## 2024-01-26 DIAGNOSIS — Z11.4 SCREENING FOR HIV (HUMAN IMMUNODEFICIENCY VIRUS): ICD-10-CM

## 2024-01-26 DIAGNOSIS — K50.00 CROHN'S DISEASE OF SMALL INTESTINE WITHOUT COMPLICATION (H): ICD-10-CM

## 2024-01-26 PROBLEM — K37 APPENDICITIS, UNSPECIFIED APPENDICITIS TYPE: Status: RESOLVED | Noted: 2023-02-21 | Resolved: 2024-01-26

## 2024-01-26 PROBLEM — K35.31 ACUTE APPENDICITIS WITH LOCALIZED PERITONITIS AND GANGRENE, WITHOUT PERFORATION OR ABSCESS: Status: RESOLVED | Noted: 2023-02-21 | Resolved: 2024-01-26

## 2024-01-26 PROCEDURE — 90677 PCV20 VACCINE IM: CPT | Performed by: INTERNAL MEDICINE

## 2024-01-26 PROCEDURE — 90471 IMMUNIZATION ADMIN: CPT | Performed by: INTERNAL MEDICINE

## 2024-01-26 PROCEDURE — 99213 OFFICE O/P EST LOW 20 MIN: CPT | Mod: 25 | Performed by: INTERNAL MEDICINE

## 2024-01-26 PROCEDURE — 90472 IMMUNIZATION ADMIN EACH ADD: CPT | Performed by: INTERNAL MEDICINE

## 2024-01-26 PROCEDURE — 99385 PREV VISIT NEW AGE 18-39: CPT | Mod: 25 | Performed by: INTERNAL MEDICINE

## 2024-01-26 PROCEDURE — 90632 HEPA VACCINE ADULT IM: CPT | Performed by: INTERNAL MEDICINE

## 2024-01-26 ASSESSMENT — ENCOUNTER SYMPTOMS
CONSTIPATION: 0
NERVOUS/ANXIOUS: 0
CHILLS: 0
WEAKNESS: 0
SHORTNESS OF BREATH: 0
HEADACHES: 0
PALPITATIONS: 0
ABDOMINAL PAIN: 0
COUGH: 0
DYSURIA: 0
SORE THROAT: 0
NAUSEA: 0
EYE PAIN: 0
FEVER: 0
HEMATOCHEZIA: 0
HEMATURIA: 0
HEARTBURN: 0
JOINT SWELLING: 0
ARTHRALGIAS: 1
DIARRHEA: 0
FREQUENCY: 0
DIZZINESS: 0
PARESTHESIAS: 0
MYALGIAS: 0

## 2024-01-26 NOTE — PATIENT INSTRUCTIONS
- restart your iron supplement- try taking it at night  - If that doesn't work or feel good, then try Novaferrum (65 mg of elemental iron daily or every other day).      Preventive Health Recommendations  Male Ages 26 - 39    Yearly exam:             See your health care provider every year in order to  o   Review health changes.   o   Discuss preventive care.    o   Review your medicines if your doctor has prescribed any.  You should be tested each year for STDs (sexually transmitted diseases), if you re at risk.   After age 35, talk to your provider about cholesterol testing. If you are at risk for heart disease, have your cholesterol tested at least every 5 years.   If you are at risk for diabetes, you should have a diabetes test (fasting glucose).  Shots: Get a flu shot each year. Get a tetanus shot every 10 years.     Nutrition:  Eat at least 5 servings of fruits and vegetables daily.   Eat whole-grain bread, whole-wheat pasta and brown rice instead of white grains and rice.   Get adequate Calcium and Vitamin D.     Lifestyle  Exercise for at least 150 minutes a week (30 minutes a day, 5 days a week). This will help you control your weight and prevent disease.   Limit alcohol to one drink per day.   No smoking.   Wear sunscreen to prevent skin cancer.   See your dentist every six months for an exam and cleaning.

## 2024-01-26 NOTE — PROGRESS NOTES
Preventive Care Visit  [facility]  Mary Velasquez DO, Internal Medicine  Jan 26, 2024      SUBJECTIVE:   Karlos is a 38 year old, presenting for the following:  Physical and Establish Care        1/26/2024     8:33 AM   Additional Questions   Roomed by CRISTAL RN   Accompanied by Son     Healthy Habits:     Getting at least 3 servings of Calcium per day:  Yes    Bi-annual eye exam:  NO    Dental care twice a year:  Yes    Sleep apnea or symptoms of sleep apnea:  None    Diet:  Regular (no restrictions)    Frequency of exercise:  4-5 days/week    Duration of exercise:  45-60 minutes    Taking medications regularly:  Yes    Medication side effects:  None    Additional concerns today:  No      Today's PHQ-2 Score:       1/26/2024     8:26 AM   PHQ-2 ( 1999 Pfizer)   Q1: Little interest or pleasure in doing things 0   Q2: Feeling down, depressed or hopeless 0   PHQ-2 Score 0   Q1: Little interest or pleasure in doing things Not at all   Q2: Feeling down, depressed or hopeless Not at all   PHQ-2 Score 0     Remicade dose was increased in December- had persistent elevated CRP, MRE with persisting terminal ileitis.  Overall feeling good.    Due for vaccines today.    Weight looks stable- 147 lbs, up from 142 lbs 12/19/23.    Has been eating well- doesn't need to avoid anything explicitly.  Feels like he is nearing back to his baseline weight (was 150 lbs).    Has been iron deficient- has a supplement.  Takes every other day- when he was taking it daily made him feel nauseated and caused constipation.    Was at dermatologist visit last month.    Have you ever done Advance Care Planning? (For example, a Health Directive, POLST, or a discussion with a medical provider or your loved ones about your wishes): No, advance care planning information given to patient to review.  Patient plans to discuss their wishes with loved ones or provider.      Social History     Tobacco Use    Smoking status:  "Never    Smokeless tobacco: Never   Substance Use Topics    Alcohol use: Yes     Comment: 0 drinks per week             1/26/2024     8:26 AM   Alcohol Use   Prescreen: >3 drinks/day or >7 drinks/week? No       Last PSA: No results found for: \"PSA\"    Reviewed orders with patient. Reviewed health maintenance and updated orders accordingly - Yes  Labs reviewed in EPIC    Reviewed and updated as needed this visit by clinical staff   Tobacco  Allergies  Meds  Problems   Surg Hx  Fam Hx          Reviewed and updated as needed this visit by Provider   Tobacco   Meds  Problems   Surg Hx  Fam Hx            Review of Systems   Constitutional:  Negative for chills and fever.   HENT:  Negative for congestion, ear pain, hearing loss and sore throat.    Eyes:  Negative for pain and visual disturbance.   Respiratory:  Negative for cough and shortness of breath.    Cardiovascular:  Negative for chest pain and palpitations.   Gastrointestinal:  Negative for abdominal pain, constipation, diarrhea and nausea.   Genitourinary:  Negative for dysuria, frequency, genital sores, hematuria, penile discharge and urgency.   Musculoskeletal:  Positive for arthralgias. Negative for joint swelling and myalgias.   Skin:  Negative for rash.   Neurological:  Negative for dizziness, weakness and headaches.   Psychiatric/Behavioral:  The patient is not nervous/anxious.        OBJECTIVE:   /78 (BP Location: Right arm, Patient Position: Sitting, Cuff Size: Adult Regular)   Pulse 79   Temp 98.3  F (36.8  C) (Temporal)   Resp 16   Wt 66.9 kg (147 lb 6.4 oz)   SpO2 100%   BMI 19.99 kg/m     Estimated body mass index is 19.99 kg/m  as calculated from the following:    Height as of 12/19/23: 1.829 m (6').    Weight as of this encounter: 66.9 kg (147 lb 6.4 oz).  Physical Exam  GENERAL: alert and no distress  EYES: Eyes grossly normal to inspection, PERRL and conjunctivae and sclerae normal  HENT: ear canals and TM's normal, nose and " mouth without ulcers or lesions  NECK: no adenopathy, no asymmetry, masses, or scars  RESP: lungs clear to auscultation - no rales, rhonchi or wheezes  CV: regular rate and rhythm, normal S1 S2, no S3 or S4, no murmur, click or rub, no peripheral edema  ABDOMEN: soft, nontender, no hepatosplenomegaly, no masses and bowel sounds normal  MS: no gross musculoskeletal defects noted, no edema  SKIN: no suspicious lesions or rashes  NEURO: Normal strength and tone, mentation intact and speech normal  PSYCH: mentation appears normal, affect normal/bright  LYMPH: no cervical, supraclavicular, axillary, or inguinal adenopathy    Diagnostic Test Results:  Labs reviewed in Epic    ASSESSMENT/PLAN:   (Z00.00) Routine general medical examination at a health care facility  Comment:   Plan: Lipid panel reflex to direct LDL Non-fasting  Colon: followed by GI for colon cancer screening  Immunizations: Immunocompromised: recommend Prevnar, Shingrix as well as Hepatitis A- will get Prevnar and Hep A today and will schedule separate appointment 2+ weeks out for 1st shingles vaccine; can get 2nd COVID vaccine 2 months after last COVID vaccine in Oct 2023 but declines today  - Will need 2 shingles vaccines  by 2 months  - Will need 2nd hepatitis A vaccine 6 months after one given today  Labs: Lipids  Discussed healthy lifestyle and aging recommendations including regular exercise, adequate and regular sleep, 5+ fruits and veggies daily.    (K50.00) Crohn's disease of small intestine without complication (H)  Comment:   - Noted, on Remicade- managed by GI    (D50.0) Iron deficiency anemia due to chronic blood loss  Comment:   - Persisting, hasn't been taking iron supplement- recommend restarting, if not tolerating can switch to Novaferrum liquid concentrate    (Z11.4) Screening for HIV (human immunodeficiency virus)  Comment:   Plan: HIV Antigen Antibody Combo    (H53.8) Blurred vision  Comment: Noted, history of Lasik- due for  eye exam sierra in light of Crohn's diagnosis.  Plan: Adult Eye  Referral    (R00.0) Tachycardia  Comment: Intermittent, noted in last office visit.  Does trend towards constipation (sierra before Crohn's)- check TSH today.  Plan: TSH with free T4 reflex      Patient has been advised of split billing requirements and indicates understanding: Yes      Counseling  Reviewed preventive health counseling, as reflected in patient instructions        He reports that he has never smoked. He has never used smokeless tobacco.          Signed Electronically by: Mary Velasquez DO

## 2024-01-29 NOTE — PROGRESS NOTES
HPI:  Patient is referred by PCP an eye exam in the setting of Crohn's disease. There is intermittently blurry vision both eyes.    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       Pertinent Medical History:  Crohn's disease  Anemia, iron deficiency    Ocular History:  S/P LASIK both eyes. ~ 2016    Eye Medications:  None    Assessment and Plan:    #   Myopia, both eyes.   No glasses needed.     #   S/P LASIK, both eyes.   #   Meibomian Gland Dysfunction, both eyes.   Symptoms of dry eyes include blurry vision, eye pain, grittiness, burning, redness, eye irritation, and tearing. The goal is not to eliminate, but to improve symptoms.   Preservative free artificial tears 4 times daily both eyes. Refresh or Systane.   Warm compress (with dry eye mask), 10 minutes, at bedtime, both eyes.      #   Small Paracentral Corneal Scar, left eye.   Not visually significant. Monitor.     #   Crohn's Disease  No uveitis seen on exam.   Symptoms of uveitis include severe light sensitivity, eye pain, and redness - return immediately if experiencing symptoms.              Patient consented to a dilated eye exam:    Yes. Side effects discussed.  Mood/affect: nice.     Medical History:  No past medical history on file.    Medications:  Current Outpatient Medications   Medication Sig Dispense Refill    CALCIUM-VITAMIN D PO Take 1 tablet by mouth daily      FERROUS BISGLYCINATE CHELATE PO Take 1 tablet by mouth three times a week     Complete documentation of historical and exam elements from today's encounter can be found in the full encounter summary report (not reduplicated in this progress note). I personally obtained the chief complaint(s) and history of present illness.  I confirmed and edited as necessary the review of systems, past medical/surgical history, family history, social history, and examination findings as documented by others; and I examined the patient myself. I personally  reviewed the relevant tests, images, and reports as documented above. I formulated and edited as necessary the assessment and plan and discussed the findings and management plan with the patient and family. - Lulú Shell OD

## 2024-02-01 ENCOUNTER — OFFICE VISIT (OUTPATIENT)
Dept: OPHTHALMOLOGY | Facility: CLINIC | Age: 39
End: 2024-02-01
Attending: INTERNAL MEDICINE
Payer: COMMERCIAL

## 2024-02-01 DIAGNOSIS — H04.123 DRY EYE SYNDROME OF BOTH EYES: Primary | ICD-10-CM

## 2024-02-01 PROCEDURE — 99213 OFFICE O/P EST LOW 20 MIN: CPT | Performed by: OPTOMETRIST

## 2024-02-01 PROCEDURE — 92004 COMPRE OPH EXAM NEW PT 1/>: CPT | Performed by: OPTOMETRIST

## 2024-02-01 RX ORDER — CARBOXYMETHYLCELLULOSE SODIUM 5 MG/ML
1 SOLUTION/ DROPS OPHTHALMIC 4 TIMES DAILY
Qty: 400 EACH | Refills: 11 | Status: SHIPPED | OUTPATIENT
Start: 2024-02-01

## 2024-02-01 ASSESSMENT — CONF VISUAL FIELD
OD_INFERIOR_TEMPORAL_RESTRICTION: 0
OD_SUPERIOR_TEMPORAL_RESTRICTION: 0
OD_SUPERIOR_NASAL_RESTRICTION: 0
OS_SUPERIOR_NASAL_RESTRICTION: 0
OS_NORMAL: 1
METHOD: COUNTING FINGERS
OS_INFERIOR_TEMPORAL_RESTRICTION: 0
OD_INFERIOR_NASAL_RESTRICTION: 0
OD_NORMAL: 1
OS_INFERIOR_NASAL_RESTRICTION: 0
OS_SUPERIOR_TEMPORAL_RESTRICTION: 0

## 2024-02-01 ASSESSMENT — TONOMETRY
OD_IOP_MMHG: 14
OS_IOP_MMHG: 14
IOP_METHOD: TONOPEN

## 2024-02-01 ASSESSMENT — VISUAL ACUITY
OD_SC+: -2
OD_SC: 20/20
METHOD: SNELLEN - LINEAR
OS_SC+: -1
OS_SC: 20/20

## 2024-02-01 ASSESSMENT — EXTERNAL EXAM - RIGHT EYE: OD_EXAM: NORMAL

## 2024-02-01 ASSESSMENT — SLIT LAMP EXAM - LIDS
COMMENTS: MEIBOMIAN GLAND DYSFUNCTION
COMMENTS: MEIBOMIAN GLAND DYSFUNCTION

## 2024-02-01 ASSESSMENT — REFRACTION_MANIFEST
OD_SPHERE: -0.25
OD_CYLINDER: SPHERE
OS_SPHERE: -0.25
OS_CYLINDER: SPHERE

## 2024-02-01 ASSESSMENT — EXTERNAL EXAM - LEFT EYE: OS_EXAM: NORMAL

## 2024-02-01 NOTE — NURSING NOTE
Chief Complaints and History of Present Illnesses   Patient presents with    Yearly Exam     Chief Complaint(s) and History of Present Illness(es)       Yearly Exam              Laterality: both eyes    Onset: years ago    Quality: Feels the va is a little blurry    Severity: moderate    Frequency: intermittently    Associated symptoms: Negative for dryness, redness, tearing, photophobia, flashes and floaters    Treatments tried: no treatments    Pain scale: 0/10              Comments    Had chron's dx this year   Margo Panchal COT 8:33 AM February 1, 2024

## 2024-02-09 ENCOUNTER — TELEPHONE (OUTPATIENT)
Dept: GASTROENTEROLOGY | Facility: CLINIC | Age: 39
End: 2024-02-09
Payer: COMMERCIAL

## 2024-02-09 NOTE — TELEPHONE ENCOUNTER
----- Message from Mtaa Lerma RN sent at 2/9/2024 10:32 AM CST -----  Regarding: RE: OPTIMIZE 111-03  Hi, Kaz,   We have the forecast back for the next infusion. I believe he has been scheduled 8 weeks from the last infusion which which was done on 1/17/2024. Dr. Suresh chose to round up the next dose to be 7.5 mg/kg (dose forcast was 7.1 mg/kg).    Would you please help to update the note to make sure his next home infusion is to the updated dosage?     Please let me know if you need any further information.     Could you let me know when the next home infusion is actually for?    Best   Mata

## 2024-02-09 NOTE — TELEPHONE ENCOUNTER
Next infusion 3/13/24.     Therapy plan updated to 7.5mg/kg, per OPTIMIZE study.    [employer] message sent to [facility] and research coordinator.

## 2024-02-12 ENCOUNTER — TELEPHONE (OUTPATIENT)
Dept: GASTROENTEROLOGY | Facility: CLINIC | Age: 39
End: 2024-02-12

## 2024-02-12 NOTE — TELEPHONE ENCOUNTER
----- Message from Priscilla Suresh MD sent at 2/9/2024  4:30 PM CST -----  Regarding: RE: OPTIMIZE 111-03  Hi Everyone,    Sorry for the misinformation. I do not want to round up to 7.5 mg/kg. Instead, I want to round up to 500 mg total. Seems like that's the plan currently anyway?    Thank you,  ES    ----- Message -----  From: Rosita Shultz [facility]  Sent: 2/9/2024   3:57 PM CST  To: Jill Astudillo, RN; #  Subject: RE: OPTIMIZE 111-03                              Hi Mata,    Just to confirm the conversation:    Current dose: Remicade (infliximab) 500 mg (8 mg/kg, 66.9 kg, and rounded to the nearest commercially available vial size) in 250 mL over 1 hour every 8 weeks    New dose: Remicade (infliximab) 500mg (7.5mg/kg, 66.9 kg, and rounded to the nearest commercially available vial size) in 250ml over 1 hour every 8 weeks.       Thanks!    Rosita Shultz PharmD  [facility]      ----- Message -----  From: Mata Lerma RN  Sent: 2/9/2024  10:39 AM CST  To: Jill Astudillo RN; #  Subject: RE: OPTIMIZE 111-03                              Hi, Kaz,   We have the forecast back for the next infusion. I believe he has been scheduled 8 weeks from the last infusion which which was done on 1/17/2024. Dr. Suresh chose to round up the next dose to be 7.5 mg/kg (dose forcast was 7.1 mg/kg).    Would you please help to update the note to make sure his next home infusion is to the updated dosage?     Please let me know if you need any further information.     Could you let me know when the next home infusion is actually for?    Best   Mata

## 2024-02-16 ENCOUNTER — ALLIED HEALTH/NURSE VISIT (OUTPATIENT)
Dept: FAMILY MEDICINE | Facility: CLINIC | Age: 39
End: 2024-02-16
Payer: COMMERCIAL

## 2024-02-16 DIAGNOSIS — Z23 NEED FOR VACCINATION: ICD-10-CM

## 2024-02-16 DIAGNOSIS — Z23 ENCOUNTER FOR IMMUNIZATION: Primary | ICD-10-CM

## 2024-02-16 PROCEDURE — 90750 HZV VACC RECOMBINANT IM: CPT

## 2024-02-16 PROCEDURE — 99207 PR NO CHARGE NURSE ONLY: CPT

## 2024-02-16 PROCEDURE — 90471 IMMUNIZATION ADMIN: CPT

## 2024-02-16 NOTE — PROGRESS NOTES
Prior to immunization administration, verified patients identity using patient s name and date of birth. Please see Immunization Activity for additional information.     Screening Questionnaire for Adult Immunization    Are you sick today?   No   Do you have allergies to medications, food, a vaccine component or latex?   No   Have you ever had a serious reaction after receiving a vaccination?   No   Do you have a long-term health problem with heart, lung, kidney, or metabolic disease (e.g., diabetes), asthma, a blood disorder, no spleen, complement component deficiency, a cochlear implant, or a spinal fluid leak?  Are you on long-term aspirin therapy?   No   Do you have cancer, leukemia, HIV/AIDS, or any other immune system problem?   No   Do you have a parent, brother, or sister with an immune system problem?   No   In the past 3 months, have you taken medications that affect  your immune system, such as prednisone, other steroids, or anticancer drugs; drugs for the treatment of rheumatoid arthritis, Crohn s disease, or psoriasis; or have you had radiation treatments?   Yes   Have you had a seizure, or a brain or other nervous system problem?   No   During the past year, have you received a transfusion of blood or blood    products, or been given immune (gamma) globulin or antiviral drug?   No   For women: Are you pregnant or is there a chance you could become       pregnant during the next month?   No   Have you received any vaccinations in the past 4 weeks?   Yes     Immunization questionnaire was positive for at least one answer.  Notified Dr. Keith .    I have reviewed the following standing orders:   This patient is due and qualifies for the Zoster vaccine.    Click here for Zoster Standing Order    I have reviewed the vaccines inclusion and exclusion criteria; No concerns regarding eligibility.     Patient instructed to remain in clinic for 15 minutes afterwards, and to report any adverse reactions.      Screening performed by Afua Tong MA on 2/16/2024 at 8:03 AM.

## 2024-03-12 ENCOUNTER — LAB (OUTPATIENT)
Dept: LAB | Facility: CLINIC | Age: 39
End: 2024-03-12
Payer: COMMERCIAL

## 2024-03-12 DIAGNOSIS — Z11.4 SCREENING FOR HIV (HUMAN IMMUNODEFICIENCY VIRUS): ICD-10-CM

## 2024-03-12 DIAGNOSIS — Z00.00 ROUTINE GENERAL MEDICAL EXAMINATION AT A HEALTH CARE FACILITY: ICD-10-CM

## 2024-03-12 DIAGNOSIS — R00.0 TACHYCARDIA: ICD-10-CM

## 2024-03-12 DIAGNOSIS — K50.90 CROHN'S DISEASE (H): ICD-10-CM

## 2024-03-12 LAB
ALBUMIN SERPL BCG-MCNC: 4.3 G/DL (ref 3.5–5.2)
ALP SERPL-CCNC: 84 U/L (ref 40–150)
ALT SERPL W P-5'-P-CCNC: 14 U/L (ref 0–70)
AST SERPL W P-5'-P-CCNC: 32 U/L (ref 0–45)
BASOPHILS # BLD AUTO: 0.1 10E3/UL (ref 0–0.2)
BASOPHILS NFR BLD AUTO: 1 %
BILIRUB DIRECT SERPL-MCNC: <0.2 MG/DL (ref 0–0.3)
BILIRUB SERPL-MCNC: 0.3 MG/DL
CHOLEST SERPL-MCNC: 151 MG/DL
CRP SERPL-MCNC: <3 MG/L
EOSINOPHIL # BLD AUTO: 0.2 10E3/UL (ref 0–0.7)
EOSINOPHIL NFR BLD AUTO: 4 %
ERYTHROCYTE [DISTWIDTH] IN BLOOD BY AUTOMATED COUNT: 14.8 % (ref 10–15)
FASTING STATUS PATIENT QL REPORTED: NO
HCT VFR BLD AUTO: 39.8 % (ref 40–53)
HDLC SERPL-MCNC: 60 MG/DL
HGB BLD-MCNC: 12.7 G/DL (ref 13.3–17.7)
HIV 1+2 AB+HIV1 P24 AG SERPL QL IA: NONREACTIVE
IMM GRANULOCYTES # BLD: 0 10E3/UL
IMM GRANULOCYTES NFR BLD: 0 %
LDLC SERPL CALC-MCNC: 70 MG/DL
LYMPHOCYTES # BLD AUTO: 2 10E3/UL (ref 0.8–5.3)
LYMPHOCYTES NFR BLD AUTO: 38 %
MCH RBC QN AUTO: 26.6 PG (ref 26.5–33)
MCHC RBC AUTO-ENTMCNC: 31.9 G/DL (ref 31.5–36.5)
MCV RBC AUTO: 83 FL (ref 78–100)
MONOCYTES # BLD AUTO: 0.5 10E3/UL (ref 0–1.3)
MONOCYTES NFR BLD AUTO: 9 %
NEUTROPHILS # BLD AUTO: 2.6 10E3/UL (ref 1.6–8.3)
NEUTROPHILS NFR BLD AUTO: 48 %
NONHDLC SERPL-MCNC: 91 MG/DL
NRBC # BLD AUTO: 0 10E3/UL
NRBC BLD AUTO-RTO: 0 /100
PLATELET # BLD AUTO: 325 10E3/UL (ref 150–450)
PROT SERPL-MCNC: 7.2 G/DL (ref 6.4–8.3)
RBC # BLD AUTO: 4.78 10E6/UL (ref 4.4–5.9)
TRIGL SERPL-MCNC: 105 MG/DL
TSH SERPL DL<=0.005 MIU/L-ACNC: 1.96 UIU/ML (ref 0.3–4.2)
WBC # BLD AUTO: 5.3 10E3/UL (ref 4–11)

## 2024-03-12 PROCEDURE — 87389 HIV-1 AG W/HIV-1&-2 AB AG IA: CPT | Performed by: INTERNAL MEDICINE

## 2024-03-12 PROCEDURE — 80076 HEPATIC FUNCTION PANEL: CPT | Performed by: PATHOLOGY

## 2024-03-12 PROCEDURE — 99000 SPECIMEN HANDLING OFFICE-LAB: CPT | Performed by: PATHOLOGY

## 2024-03-12 PROCEDURE — 36415 COLL VENOUS BLD VENIPUNCTURE: CPT | Performed by: PATHOLOGY

## 2024-03-12 PROCEDURE — 85025 COMPLETE CBC W/AUTO DIFF WBC: CPT | Performed by: PATHOLOGY

## 2024-03-12 PROCEDURE — 86140 C-REACTIVE PROTEIN: CPT | Performed by: PATHOLOGY

## 2024-03-12 PROCEDURE — 80061 LIPID PANEL: CPT | Performed by: PATHOLOGY

## 2024-03-12 PROCEDURE — 84443 ASSAY THYROID STIM HORMONE: CPT | Performed by: PATHOLOGY

## 2024-03-13 ENCOUNTER — MEDICAL CORRESPONDENCE (OUTPATIENT)
Dept: HEALTH INFORMATION MANAGEMENT | Facility: CLINIC | Age: 39
End: 2024-03-13
Payer: COMMERCIAL

## 2024-03-13 ENCOUNTER — DOCUMENTATION ONLY (OUTPATIENT)
Dept: PHARMACY | Facility: CLINIC | Age: 39
End: 2024-03-13
Payer: COMMERCIAL

## 2024-03-27 NOTE — PROGRESS NOTES
Skilled Nurse visit in the Patient Home to administer 500mg IV Remicade.  No recent elevated temperature, fever, chills, productive cough, coughing for 3 weeks or longer or hemoptysis, abnormal vital signs, night sweats, chest pain. No decrease in your appetite, unexplained weight loss or fatigue.  No other new onset medical symptoms.  Current weight 152lbs.  Peripheral IV left Upper forearm, 1 attempt. Labs drawn in clinic. Infusion completed without complication or reaction. Pt reports therapy is effective in managing symptoms related to therapy.     Rona Hwang, RN, BSN   [facility]

## 2024-04-11 ENCOUNTER — TELEPHONE (OUTPATIENT)
Dept: GASTROENTEROLOGY | Facility: CLINIC | Age: 39
End: 2024-04-11
Payer: COMMERCIAL

## 2024-04-11 DIAGNOSIS — K50.919 CROHN'S DISEASE WITH COMPLICATION, UNSPECIFIED GASTROINTESTINAL TRACT LOCATION (H): Primary | ICD-10-CM

## 2024-04-11 NOTE — TELEPHONE ENCOUNTER
Infliximab therapy plan orders ; plan has been updated. Patient's medication regimen actively changes, as he is participating in an ongoing research study. Standing lab orders placed. TB Quantiferon Gold is due next month - May 2023. Order placed updated patient via [facility]. Hepatitis B serologies are up to date. Next office visit scheduled for July with Dr. Suresh.

## 2024-04-11 NOTE — TELEPHONE ENCOUNTER
----- Message from Keyonna [facility] sent at 4/11/2024  8:32 AM CDT -----  Regarding: Please update Remicade therapy plan  Kaz-  Please update Karlos's Remicade therapy plan. It expires on 5/9/24. He last infused on 3/13/24 and would be due again around 5/9/24.    Thanks, Keyonna [facility] Pharmacist

## 2024-05-08 ENCOUNTER — DOCUMENTATION ONLY (OUTPATIENT)
Dept: PHARMACY | Facility: CLINIC | Age: 39
End: 2024-05-08

## 2024-05-08 ENCOUNTER — LAB (OUTPATIENT)
Dept: LAB | Facility: CLINIC | Age: 39
End: 2024-05-08
Payer: COMMERCIAL

## 2024-05-08 DIAGNOSIS — K50.919 CROHN'S DISEASE WITH COMPLICATION, UNSPECIFIED GASTROINTESTINAL TRACT LOCATION (H): ICD-10-CM

## 2024-05-08 LAB
ALBUMIN SERPL BCG-MCNC: 4.6 G/DL (ref 3.5–5.2)
ALP SERPL-CCNC: 93 U/L (ref 40–150)
ALT SERPL W P-5'-P-CCNC: 10 U/L (ref 0–70)
AST SERPL W P-5'-P-CCNC: 20 U/L (ref 0–45)
BASOPHILS # BLD AUTO: 0.1 10E3/UL (ref 0–0.2)
BASOPHILS NFR BLD AUTO: 1 %
BILIRUB DIRECT SERPL-MCNC: <0.2 MG/DL (ref 0–0.3)
BILIRUB SERPL-MCNC: 0.3 MG/DL
CRP SERPL-MCNC: <3 MG/L
EOSINOPHIL # BLD AUTO: 0.1 10E3/UL (ref 0–0.7)
EOSINOPHIL NFR BLD AUTO: 2 %
ERYTHROCYTE [DISTWIDTH] IN BLOOD BY AUTOMATED COUNT: 15.1 % (ref 10–15)
HCT VFR BLD AUTO: 43.1 % (ref 40–53)
HGB BLD-MCNC: 14 G/DL (ref 13.3–17.7)
IMM GRANULOCYTES # BLD: 0 10E3/UL
IMM GRANULOCYTES NFR BLD: 0 %
LYMPHOCYTES # BLD AUTO: 2.1 10E3/UL (ref 0.8–5.3)
LYMPHOCYTES NFR BLD AUTO: 36 %
MCH RBC QN AUTO: 26.9 PG (ref 26.5–33)
MCHC RBC AUTO-ENTMCNC: 32.5 G/DL (ref 31.5–36.5)
MCV RBC AUTO: 83 FL (ref 78–100)
MONOCYTES # BLD AUTO: 0.5 10E3/UL (ref 0–1.3)
MONOCYTES NFR BLD AUTO: 9 %
NEUTROPHILS # BLD AUTO: 3 10E3/UL (ref 1.6–8.3)
NEUTROPHILS NFR BLD AUTO: 52 %
NRBC # BLD AUTO: 0 10E3/UL
NRBC BLD AUTO-RTO: 0 /100
PLATELET # BLD AUTO: 324 10E3/UL (ref 150–450)
PROT SERPL-MCNC: 7.7 G/DL (ref 6.4–8.3)
RBC # BLD AUTO: 5.2 10E6/UL (ref 4.4–5.9)
WBC # BLD AUTO: 5.8 10E3/UL (ref 4–11)

## 2024-05-08 PROCEDURE — 36415 COLL VENOUS BLD VENIPUNCTURE: CPT | Performed by: PATHOLOGY

## 2024-05-08 PROCEDURE — 85025 COMPLETE CBC W/AUTO DIFF WBC: CPT | Performed by: PATHOLOGY

## 2024-05-08 PROCEDURE — 86140 C-REACTIVE PROTEIN: CPT | Performed by: PATHOLOGY

## 2024-05-08 PROCEDURE — 80076 HEPATIC FUNCTION PANEL: CPT | Performed by: PATHOLOGY

## 2024-05-08 PROCEDURE — 99000 SPECIMEN HANDLING OFFICE-LAB: CPT | Performed by: PATHOLOGY

## 2024-05-08 PROCEDURE — 86481 TB AG RESPONSE T-CELL SUSP: CPT | Performed by: INTERNAL MEDICINE

## 2024-05-09 LAB
GAMMA INTERFERON BACKGROUND BLD IA-ACNC: 0.06 IU/ML
M TB IFN-G BLD-IMP: NEGATIVE
M TB IFN-G CD4+ BCKGRND COR BLD-ACNC: 9.94 IU/ML
MITOGEN IGNF BCKGRD COR BLD-ACNC: 0.01 IU/ML
MITOGEN IGNF BCKGRD COR BLD-ACNC: 0.05 IU/ML
QUANTIFERON MITOGEN: 10 IU/ML
QUANTIFERON NIL TUBE: 0.06 IU/ML
QUANTIFERON TB1 TUBE: 0.07 IU/ML
QUANTIFERON TB2 TUBE: 0.11

## 2024-05-24 ENCOUNTER — HOSPITAL ENCOUNTER (OUTPATIENT)
Dept: MRI IMAGING | Facility: CLINIC | Age: 39
Discharge: HOME | End: 2024-05-24
Attending: INTERNAL MEDICINE | Admitting: INTERNAL MEDICINE
Payer: COMMERCIAL

## 2024-05-24 DIAGNOSIS — K50.919 CROHN'S DISEASE WITH COMPLICATION, UNSPECIFIED GASTROINTESTINAL TRACT LOCATION (H): ICD-10-CM

## 2024-05-24 PROCEDURE — 255N000002 HC RX 255 OP 636: Performed by: INTERNAL MEDICINE

## 2024-05-24 PROCEDURE — 72197 MRI PELVIS W/O & W/DYE: CPT | Mod: 26 | Performed by: RADIOLOGY

## 2024-05-24 PROCEDURE — A9585 GADOBUTROL INJECTION: HCPCS | Performed by: INTERNAL MEDICINE

## 2024-05-24 PROCEDURE — 250N000011 HC RX IP 250 OP 636: Performed by: INTERNAL MEDICINE

## 2024-05-24 PROCEDURE — 74183 MRI ABD W/O CNTR FLWD CNTR: CPT | Mod: 26 | Performed by: RADIOLOGY

## 2024-05-24 PROCEDURE — 74183 MRI ABD W/O CNTR FLWD CNTR: CPT

## 2024-05-24 RX ORDER — GADOBUTROL 604.72 MG/ML
7.5 INJECTION INTRAVENOUS ONCE
Status: COMPLETED | OUTPATIENT
Start: 2024-05-24 | End: 2024-05-24

## 2024-05-24 RX ADMIN — GADOBUTROL 7 ML: 604.72 INJECTION INTRAVENOUS at 12:20

## 2024-05-24 RX ADMIN — Medication 1 MG: at 11:41

## 2024-05-24 NOTE — PROGRESS NOTES
Skilled Nurse visit in the [facility] to administer Remicade (infliximab) 500 mg (based on a dosage of 8 mg/kg, a weight of 66.1 kg, and rounded to the nearest commercially available vial size) in 250 mL of sodium chloride 0.9% IV over approximately 1 hour every 8 weeks. [facility] pharmacist to titrate medication based on patient tolerance and comfort. Rate: 250mL/hr  Lot [id number], exp JUN 26 x 3 vials, Lot [id number], exp Sep 26 x 2.  No recent elevated temperature, fever, chills, productive cough, coughing for 3 weeks or longer or hemoptysis, abnormal vital signs, night sweats, chest pain. No  decrease in your appetite, unexplained weight loss or fatigue.  No other new onset medical symptoms.  Current weight 155.  Peripheral IVleft Lower Forearm, 1attempt Pre medicated with none. Labs drawn none. Infusion completed without complication or reaction. Pt reports therapy iseffective in managing symptoms related to therapy.

## 2024-05-30 ENCOUNTER — DOCUMENTATION ONLY (OUTPATIENT)
Dept: GASTROENTEROLOGY | Facility: CLINIC | Age: 39
End: 2024-05-30
Payer: COMMERCIAL

## 2024-05-30 DIAGNOSIS — K50.919 CROHN'S DISEASE WITH COMPLICATION, UNSPECIFIED GASTROINTESTINAL TRACT LOCATION (H): Primary | ICD-10-CM

## 2024-05-30 NOTE — PROGRESS NOTES
Prometheus form filled out with following information:    Can you please fill out and scan into the patients chart a prometheus lab order form for this patient?     Infliximab Trough Level     Medication:  IFX   Dose: 8mg/kg = 500 mg   Frequency: q8wks   Provider: Dr. Loe   When is lab due: 7/2/24   Does the patient need a lab appointment: Angella, [facility] to draw     Scanned into patient chart. Lab order is in. Added to patient list. Copy also sent via email to [email or web address]      Chreie Jiang LPN

## 2024-05-30 NOTE — PROGRESS NOTES
Per Dr. Suresh -    Draw Infliximab level with next infusion.    Confirmed with [facility] that next infusion date is 7/2/24.     Order placed, and instructions added into therapy plan. Updated [facility] to draw level with next infusion.    [employer] message sent to Ms. Jiang requesting [employer] form be completed.

## 2024-06-03 ENCOUNTER — MEDICAL CORRESPONDENCE (OUTPATIENT)
Dept: HEALTH INFORMATION MANAGEMENT | Facility: CLINIC | Age: 39
End: 2024-06-03
Payer: COMMERCIAL

## 2024-06-18 ENCOUNTER — TELEPHONE (OUTPATIENT)
Dept: GASTROENTEROLOGY | Facility: CLINIC | Age: 39
End: 2024-06-18
Payer: COMMERCIAL

## 2024-06-18 NOTE — TELEPHONE ENCOUNTER
Current orders are for 500mg at 8mg/kg with weight of 66.9kg every 8 weeks.     Per Dr. Suresh, next infusion should 5mg/kg dosing which would be 300mg (rounded to nearest vial).     Dosing from last infusion on 5/8/24 at 8wks would be 7/3 (scheduled on 7/2 currently).     STAT prior authorization requested for q6wks, per the study protocol. Requested patient be scheduled between 6/20-6/27. I aware.    Therapy plan updated to 5mg/kg q6wks.     Previous plan to draw IFX level with next infusion that was originally scheduled on 7/2. Will update level order and therapy plan instructions upon confirmed rescheduled date.     [facility] updated.

## 2024-06-18 NOTE — TELEPHONE ENCOUNTER
[facility] Call Center    Phone Message    May a detailed message be left on voicemail: yes     Reason for Call: Other: Requesting call back in regards to Remicaid. URGENT need to clarify dosage and frequency.      Action Taken: Other: csc gi    Travel Screening: Not Applicable     Date of Service:

## 2024-06-26 ENCOUNTER — TELEPHONE (OUTPATIENT)
Dept: GASTROENTEROLOGY | Facility: CLINIC | Age: 39
End: 2024-06-26
Payer: COMMERCIAL

## 2024-06-26 DIAGNOSIS — K50.919 CROHN'S DISEASE WITH COMPLICATION, UNSPECIFIED GASTROINTESTINAL TRACT LOCATION (H): Primary | ICD-10-CM

## 2024-06-26 NOTE — TELEPHONE ENCOUNTER
----- Message from Jane CH sent at 6/24/2024  2:41 PM CDT -----  Regarding: FW: OPTIMIZE subject 3    ----- Message -----  From: Priscilla Suresh MD  Sent: 6/24/2024   2:20 PM CDT  To: Tramaine Rolle; Jane Romero, RN; #  Subject: RE: OPTIMIZE subject 3                           Sure, I can order an FCP, but the MRE that he recently had is much more valuable. Is the medical director aware of the MRE?    Jane -- can you please order an FCP for Karlos?    -E  ----- Message -----  From: Tramaine Rolle  Sent: 6/24/2024  12:42 PM CDT  To: Priscilla Suresh MD; Rona Barboza RN  Subject: OPTIMIZE subject 3                               Hi, Dr Suresh and Rona,   I talked to the company and wanted to see whether there is any endoscopy that is planned for this patient. If not, would you considered test him on fecal calprotectin as standard of care. The study medical director is hoping that is done to indicate the patient disease progress if no endoscopy data is available on top of the CDAI.     Best  [place]

## 2024-06-27 ENCOUNTER — ALLIED HEALTH/NURSE VISIT (OUTPATIENT)
Dept: FAMILY MEDICINE | Facility: CLINIC | Age: 39
End: 2024-06-27
Payer: COMMERCIAL

## 2024-06-27 DIAGNOSIS — K50.018 CROHN'S DISEASE OF ILEUM WITH OTHER COMPLICATION (H): Primary | ICD-10-CM

## 2024-06-27 DIAGNOSIS — Z23 ENCOUNTER FOR IMMUNIZATION: Primary | ICD-10-CM

## 2024-06-27 DIAGNOSIS — K50.919 CROHN'S DISEASE WITH COMPLICATION, UNSPECIFIED GASTROINTESTINAL TRACT LOCATION (H): ICD-10-CM

## 2024-06-27 PROCEDURE — 99207 PR NO CHARGE NURSE ONLY: CPT

## 2024-06-27 PROCEDURE — 90750 HZV VACC RECOMBINANT IM: CPT

## 2024-06-27 PROCEDURE — 90471 IMMUNIZATION ADMIN: CPT

## 2024-06-27 NOTE — PROGRESS NOTES
Prior to immunization administration, verified patients identity using patient s name and date of birth. Please see Immunization Activity for additional information.     Screening Questionnaire for Adult Immunization    Are you sick today?   No   Do you have allergies to medications, food, a vaccine component or latex?   Yes   Have you ever had a serious reaction after receiving a vaccination?   No   Do you have a long-term health problem with heart, lung, kidney, or metabolic disease (e.g., diabetes), asthma, a blood disorder, no spleen, complement component deficiency, a cochlear implant, or a spinal fluid leak?  Are you on long-term aspirin therapy?   No   Do you have cancer, leukemia, HIV/AIDS, or any other immune system problem?   No   Do you have a parent, brother, or sister with an immune system problem?   No   In the past 3 months, have you taken medications that affect  your immune system, such as prednisone, other steroids, or anticancer drugs; drugs for the treatment of rheumatoid arthritis, Crohn s disease, or psoriasis; or have you had radiation treatments?   No   Have you had a seizure, or a brain or other nervous system problem?   No   During the past year, have you received a transfusion of blood or blood    products, or been given immune (gamma) globulin or antiviral drug?   No   For women: Are you pregnant or is there a chance you could become       pregnant during the next month?   No   Have you received any vaccinations in the past 4 weeks?   No     Immunization questionnaire was positive for at least one answer.  Notified Matthew.    I have reviewed the following standing orders: {Adult Vaccine Standing Order:877936}    Patient instructed to remain in clinic for 15 minutes afterwards, and to report any adverse reactions.     Screening performed by Osbaldo Farnsworth CMA on 6/27/2024 at 11:25 AM.

## 2024-06-27 NOTE — NURSING NOTE
Prior to immunization administration, verified patients identity using patient s name and date of birth. Please see Immunization Activity for additional information.     Screening Questionnaire for Adult Immunization    Are you sick today?   No   Do you have allergies to medications, food, a vaccine component or latex?   Yes   Have you ever had a serious reaction after receiving a vaccination?   No   Do you have a long-term health problem with heart, lung, kidney, or metabolic disease (e.g., diabetes), asthma, a blood disorder, no spleen, complement component deficiency, a cochlear implant, or a spinal fluid leak?  Are you on long-term aspirin therapy?   No   Do you have cancer, leukemia, HIV/AIDS, or any other immune system problem?   No   Do you have a parent, brother, or sister with an immune system problem?   No   In the past 3 months, have you taken medications that affect  your immune system, such as prednisone, other steroids, or anticancer drugs; drugs for the treatment of rheumatoid arthritis, Crohn s disease, or psoriasis; or have you had radiation treatments?   No   Have you had a seizure, or a brain or other nervous system problem?   No   During the past year, have you received a transfusion of blood or blood    products, or been given immune (gamma) globulin or antiviral drug?   No   For women: Are you pregnant or is there a chance you could become       pregnant during the next month?   No   Have you received any vaccinations in the past 4 weeks?   No     Immunization questionnaire was positive for at least one answer.  Notified itzel.    I have reviewed the following standing orders:   This patient is due and qualifies for the Zoster vaccine.    Click here for Zoster Standing Order    I have reviewed the vaccines inclusion and exclusion criteria; No concerns regarding eligibility.     Patient instructed to remain in clinic for 15 minutes afterwards, and to report any adverse reactions.     Screening  performed by Osbaldo Farnsworth CMA on 6/27/2024 at 11:33 AM.

## 2024-06-28 PROCEDURE — 83993 ASSAY FOR CALPROTECTIN FECAL: CPT

## 2024-07-01 LAB — CALPROTECTIN STL-MCNT: 452 MG/KG (ref 0–49.9)

## 2024-07-02 ENCOUNTER — OFFICE VISIT (OUTPATIENT)
Dept: GASTROENTEROLOGY | Facility: CLINIC | Age: 39
End: 2024-07-02
Attending: INTERNAL MEDICINE
Payer: COMMERCIAL

## 2024-07-02 ENCOUNTER — LAB (OUTPATIENT)
Dept: LAB | Facility: CLINIC | Age: 39
End: 2024-07-02
Payer: COMMERCIAL

## 2024-07-02 ENCOUNTER — MEDICAL CORRESPONDENCE (OUTPATIENT)
Dept: HEALTH INFORMATION MANAGEMENT | Facility: CLINIC | Age: 39
End: 2024-07-02

## 2024-07-02 VITALS
HEIGHT: 72 IN | SYSTOLIC BLOOD PRESSURE: 112 MMHG | BODY MASS INDEX: 20.45 KG/M2 | DIASTOLIC BLOOD PRESSURE: 75 MMHG | WEIGHT: 151 LBS | OXYGEN SATURATION: 99 % | HEART RATE: 77 BPM

## 2024-07-02 DIAGNOSIS — D84.9 IMMUNOSUPPRESSION (H): ICD-10-CM

## 2024-07-02 DIAGNOSIS — K50.018 CROHN'S DISEASE OF ILEUM WITH OTHER COMPLICATION (H): ICD-10-CM

## 2024-07-02 DIAGNOSIS — K50.919 CROHN'S DISEASE WITH COMPLICATION, UNSPECIFIED GASTROINTESTINAL TRACT LOCATION (H): ICD-10-CM

## 2024-07-02 DIAGNOSIS — K50.018 CROHN'S DISEASE OF ILEUM WITH OTHER COMPLICATION (H): Primary | ICD-10-CM

## 2024-07-02 LAB
ALBUMIN SERPL BCG-MCNC: 4.4 G/DL (ref 3.5–5.2)
ALP SERPL-CCNC: 83 U/L (ref 40–150)
ALT SERPL W P-5'-P-CCNC: 9 U/L (ref 0–70)
AST SERPL W P-5'-P-CCNC: 17 U/L (ref 0–45)
BASOPHILS # BLD AUTO: 0.1 10E3/UL (ref 0–0.2)
BASOPHILS NFR BLD AUTO: 1 %
BILIRUB DIRECT SERPL-MCNC: <0.2 MG/DL (ref 0–0.3)
BILIRUB SERPL-MCNC: 0.3 MG/DL
CRP SERPL-MCNC: 3.69 MG/L
EOSINOPHIL # BLD AUTO: 0.2 10E3/UL (ref 0–0.7)
EOSINOPHIL NFR BLD AUTO: 4 %
ERYTHROCYTE [DISTWIDTH] IN BLOOD BY AUTOMATED COUNT: 14 % (ref 10–15)
HCT VFR BLD AUTO: 41.2 % (ref 40–53)
HGB BLD-MCNC: 13.9 G/DL (ref 13.3–17.7)
IMM GRANULOCYTES # BLD: 0 10E3/UL
IMM GRANULOCYTES NFR BLD: 0 %
LYMPHOCYTES # BLD AUTO: 1.9 10E3/UL (ref 0.8–5.3)
LYMPHOCYTES NFR BLD AUTO: 40 %
MCH RBC QN AUTO: 28.7 PG (ref 26.5–33)
MCHC RBC AUTO-ENTMCNC: 33.7 G/DL (ref 31.5–36.5)
MCV RBC AUTO: 85 FL (ref 78–100)
MONOCYTES # BLD AUTO: 0.4 10E3/UL (ref 0–1.3)
MONOCYTES NFR BLD AUTO: 8 %
NEUTROPHILS # BLD AUTO: 2.2 10E3/UL (ref 1.6–8.3)
NEUTROPHILS NFR BLD AUTO: 47 %
NRBC # BLD AUTO: 0 10E3/UL
NRBC BLD AUTO-RTO: 0 /100
PLATELET # BLD AUTO: 281 10E3/UL (ref 150–450)
PROT SERPL-MCNC: 7.5 G/DL (ref 6.4–8.3)
RBC # BLD AUTO: 4.85 10E6/UL (ref 4.4–5.9)
WBC # BLD AUTO: 4.7 10E3/UL (ref 4–11)

## 2024-07-02 PROCEDURE — 85025 COMPLETE CBC W/AUTO DIFF WBC: CPT | Performed by: PATHOLOGY

## 2024-07-02 PROCEDURE — 82542 COL CHROMOTOGRAPHY QUAL/QUAN: CPT | Performed by: INTERNAL MEDICINE

## 2024-07-02 PROCEDURE — 99215 OFFICE O/P EST HI 40 MIN: CPT | Performed by: INTERNAL MEDICINE

## 2024-07-02 PROCEDURE — 36415 COLL VENOUS BLD VENIPUNCTURE: CPT | Performed by: PATHOLOGY

## 2024-07-02 PROCEDURE — 80076 HEPATIC FUNCTION PANEL: CPT | Performed by: PATHOLOGY

## 2024-07-02 PROCEDURE — 86140 C-REACTIVE PROTEIN: CPT | Performed by: PATHOLOGY

## 2024-07-02 ASSESSMENT — PAIN SCALES - GENERAL: PAINLEVEL: NO PAIN (0)

## 2024-07-02 NOTE — LETTER
7/2/2024      Karlos Terrazas  [address] [address]      Dear Colleague,    Thank you for referring your patient, Karlos Terrazas, to the [facility] [place]. Please see a copy of my visit note below.    IBD CLINIC VISIT-RETURN    CC/REFERRING MD:  Lazaro Leo  REASON FOR CONSULTATION: initially for concern for Crohn's based on colonoscopy 4/2023    ASSESSMENT/PLAN    1. Moderate to severe Crohn's ileitis (multifocal with stricturing) and colitis (histologically only).   Current medication: infliximab 5mg/kg every 8 weeks - initiated 6/20/2023   Last endoscopic disease activity: colonoscopy 4/2023 IC valve is open; however, advancing the scope beyond ~ 3 cm into the terminal ileum was not possible due to either stricture or extrinsic compression.  Biopsies show active ileitis with granulomas and patchy active colitis on random colonic biopsies. MRE 5/2023 active small bowel inflammation with narrowing. Repeat MRE 12/2023 without any significant changes from MRE 5/2023. MRE 5/2024 showed the same.     In reviewing Karlos's MREs, it does seem that Remicade has helped heal two areas of small bowel inflammation. I suspect that the remaining 15 cm of the TI are mostly chronic inflammation that are not amenable to further healing with medication. I recognize the upstream dilatation as a risk factor for SBO and I think it would be prudent to discuss surgical resection, given likely medical optimization has been achieved.     If Karlos is not interested in surgery, we can also try another medication. We discussed various options, including adding 6MP, Skyrizi and Rinvoq. Given excellent safety profile and an alternate MOA, we decided to pursue a PA for Skyrizi as an option for the future.     We will proceed with the following:    --IFX trough level. Optimize if below 10.   --Referral to colorectal surgery for education purposes regarding ICR. This is a  reasonable next step vs. another medication.   --Submit pre-authorization for Skyrizi if we decide to switch therapies in the future  --------[facility] pharmacy for education        Return to clinic in 6 months    Interval history (7/2025, in person):   Possible waning effect x last 2 weeks. Feels a mild abd pain on the right side.   Just got Remicade infusion this morning.   Having 1 solid BM daily.   No weight loss.   Tolerating all foods, but does have smaller amounts of certain foods including popcorn. Does eat salads.      Hands, ankles, feet, toes  -- mild arthralgias.     IBD HISTORY  Age at diagnosis: 37 (2023)  Extent of endoscopic disease: mainly small intestine but histologic colon as well on random biopsies  Extent of histologic disease: inflammatory/stricturing  Prior IBD Medications: none    Current IBD Medications: infliximab    Most Recent Endoscopy and Results:   Colonoscopy 4/7/23        HBI:  Overall patient well being (prior day): 0 (Very well)  Abdominal pain (prior day): 0 (None)  Number of liquid or soft stools (prior day): 0 (1 point per stool)  Abdominal mass on exam: 0 (None)  Complications (1 point for each): 0  Neuropathy: bottom of feet achy burny but improving    Remission <5  Mild activity 5-7  Moderate activity 8-16  Severe > 16    Extraintestinal manifestations (anytime during the course of disease)  L3: ileocolonic     DRUG MONITORING    Biologic concentration:   IFX in process from today.   Infliximab 9/27/2023, concentration 7.7, antibodies < 3.1.     Misbah Classification  AGE AT DIAGNOSIS: A2 between 17 and 40 y  CURRENT DISEASE LOCATION: L3: ileocolonic  L4 upper GI: NO  DISEASE BEHAVIOR (since disease onset): B2: stricturing  Perianal disease: NO    Constitutional symptoms:  Fever NO  Weight loss NO    Other GI symptoms present : none        PHYSICAL EXAMINATION:  Constitutional: aaox3, cooperative, pleasant, not dyspneic/diaphoretic, no acute distress  Vitals reviewed: /75    Pulse 77   Ht 1.829 m (6')   Wt 68.5 kg (151 lb)   SpO2 99%   BMI 20.48 kg/m    Wt:   Wt Readings from Last 2 Encounters:   07/02/24 68.5 kg (151 lb)   01/26/24 66.9 kg (147 lb 6.4 oz)      Eyes: Sclera anicteric/injected  Ears/nose/mouth/throat: MMM  Neck: supple  CV: No edema  Respiratory: Unlabored breathing  Lymph: No axillary, submandibular, supraclavicular or inguinal lymphadenopathy  Abd:  Nondistended, soft, no hepatosplenomegaly, nontender, no peritoneal signs  Skin: warm, perfused, no jaundice  Psych: Normal affect  MSK: Normal gait    LABS:  FCP 6/2024: 452  IFX level in process    IMAGING:    MRE 5/2024:          IMPRESSION:   When compared with MRE dated 12/12/2023, there is no significant  change in acute inflammation with chronic fibrosis involving 15cm of  the terminal ileum. There is luminal narrowing without upstream  dilatation.    MR Enterography 12/12/23  EXAMINATION: MR ENTEROGRAPHY W/O AND W CONTRAST, 12/12/2023 10:12 AM     TECHNIQUE:  Multiplanar, multisequence MRI imaging of the abdomen and  pelvis was obtained using MR enterography protocol without and with  intravenous gadolinium. Contrast dose: 7 mL Gadavist     COMPARISON: 5/3/2023     HISTORY: please assess for Crohn's activity; Crohn's disease with  complication, unspecified gastrointestinal tract location (H)     FINDINGS:     Lower thorax: The lung bases are clear.     Abdomen and Pelvis:   There is approximately 20 cm of mucosal hyperenhancement with  associated bowel wall thickening in the distal ileum (series 18 image  11 through 28). Additionally, there is associated luminal narrowing  with these regions of small bowel inflammation. No evidence of abscess  or fistula. The large bowel is unremarkable. There is a large colonic  stool burden. No evidence of bowel obstruction. There is some mildly  hyperenhancing bowel in the left upper abdomen, however this is likely  related to bowel underdistention rather than acute  inflammation.     The liver, spleen, pancreas, adrenal glands, and kidneys are  unremarkable. Tiny simple left renal cortical cysts. Layering biliary  sludge. There are multiple prominent in mildly enlarged mesenteric  lymph nodes, which are predominantly seen in the right lower quadrant  and are likely reactive.     Bones: No suspicious or aggressive appearing bone lesions. T12  vertebral body hemangioma.                                                                      IMPRESSION:  1. Long segment of acute inflammation of the ileum with associated  luminal narrowing, which is not significantly changed compared to  prior exam of 5/3/2023.   2. Mild mesenteric lymphadenopathy, likely reactive.    PERTINENT PAST MEDICAL HISTORY:  Past Medical History:   Diagnosis Date    Anemia 2/1/2023    Crohn's disease       PREVIOUS SURGERIES:  Past Surgical History:   Procedure Laterality Date    COLONOSCOPY N/A 04/07/2023    Procedure: COLONOSCOPY, WITH BIOPSY;  Surgeon: Lamont Seaman MD;  Location: [facility] OR    LAPAROSCOPIC APPENDECTOMY N/A 02/22/2023    STILL HAS APPENDIX    LASIK  1/1/2019       ALLERGIES:     Allergies   Allergen Reactions    Other Food Allergy Angioedema     carrots       PERTINENT MEDICATIONS:    Current Outpatient Medications:     CALCIUM-VITAMIN D PO, Take 1 tablet by mouth daily, Disp: , Rfl:     carboxymethylcellulose PF (CARBOXYMETHYLCELLULOSE SODIUM) 0.5 % ophthalmic solution, Place 1 drop into both eyes 4 times daily Preservative free artificial tears, single use vials., Disp: 400 each, Rfl: 11    FERROUS BISGLYCINATE CHELATE PO, Take 1 tablet by mouth three times a week, Disp: , Rfl:     SOCIAL HISTORY:  Social History     Socioeconomic History    Marital status:      Spouse name: Not on file    Number of children: Not on file    Years of education: Not on file    Highest education level: Not on file   Occupational History    Not on file   Tobacco Use    Smoking status: Never     Smokeless tobacco: Never   Vaping Use    Vaping status: Never Used   Substance and Sexual Activity    Alcohol use: Not Currently     Comment: 0 drinks per week    Drug use: Yes     Types: Marijuana     Comment: Occasional    Sexual activity: Yes     Partners: Female     Birth control/protection: Condom   Other Topics Concern    Parent/sibling w/ CABG, MI or angioplasty before 65F 55M? No   Social History Narrative    Not on file     Social Determinants of Health     Financial Resource Strain: Low Risk  (1/26/2024)    Financial Resource Strain     Within the past 12 months, have you or your family members you live with been unable to get utilities (heat, electricity) when it was really needed?: No   Food Insecurity: Low Risk  (1/26/2024)    Food Insecurity     Within the past 12 months, did you worry that your food would run out before you got money to buy more?: No     Within the past 12 months, did the food you bought just not last and you didn t have money to get more?: No   Transportation Needs: Low Risk  (1/26/2024)    Transportation Needs     Within the past 12 months, has lack of transportation kept you from medical appointments, getting your medicines, non-medical meetings or appointments, work, or from getting things that you need?: No   Physical Activity: Not on file   Stress: Not on file   Social Connections: Not on file   Interpersonal Safety: Not on file   Housing Stability: Low Risk  (1/26/2024)    Housing Stability     Do you have housing? : Yes     Are you worried about losing your housing?: No       FAMILY HISTORY:  Family History   Problem Relation Age of Onset    Melanoma Mother     Skin Cancer Mother     Hyperlipidemia Mother     Thyroid Disease Mother     Skin Cancer Father         SCC and BCC    Skin Cancer Maternal Grandmother     Melanoma Paternal Grandmother     Colon Cancer No family hx of        Past/family/social history reviewed and no changes        Again, thank you for allowing me to  participate in the care of your patient.      Sincerely,    Priscilla Suresh MD

## 2024-07-02 NOTE — NURSING NOTE
Chief Complaint   Patient presents with    Follow Up       Vitals:    07/02/24 1534   BP: 112/75   Pulse: 77   SpO2: 99%   Weight: 68.5 kg (151 lb)   Height: 1.829 m (6')       Body mass index is 20.48 kg/m .    Xenia Contreras MA

## 2024-07-02 NOTE — PROGRESS NOTES
IBD CLINIC VISIT-RETURN    CC/REFERRING MD:  Lazaro Leo  REASON FOR CONSULTATION: initially for concern for Crohn's based on colonoscopy 4/2023    ASSESSMENT/PLAN    1. Moderate to severe Crohn's ileitis (multifocal with stricturing) and colitis (histologically only).   Current medication: infliximab 5mg/kg every 8 weeks - initiated 6/20/2023   Last endoscopic disease activity: colonoscopy 4/2023 IC valve is open; however, advancing the scope beyond ~ 3 cm into the terminal ileum was not possible due to either stricture or extrinsic compression.  Biopsies show active ileitis with granulomas and patchy active colitis on random colonic biopsies. MRE 5/2023 active small bowel inflammation with narrowing. Repeat MRE 12/2023 without any significant changes from MRE 5/2023. MRE 5/2024 showed the same.     In reviewing Karlos's MREs, it does seem that Remicade has helped heal two areas of small bowel inflammation. I suspect that the remaining 15 cm of the TI are mostly chronic inflammation that are not amenable to further healing with medication. I recognize the upstream dilatation as a risk factor for SBO and I think it would be prudent to discuss surgical resection, given likely medical optimization has been achieved.     If Karlos is not interested in surgery, we can also try another medication. We discussed various options, including adding 6MP, Skyrizi and Rinvoq. Given excellent safety profile and an alternate MOA, we decided to pursue a PA for Skyrizi as an option for the future.     We will proceed with the following:    --IFX trough level. Optimize if below 10.   --Referral to colorectal surgery for education purposes regarding ICR. This is a reasonable next step vs. another medication.   --Submit pre-authorization for Skyrizi if we decide to switch therapies in the future  --------[facility] pharmacy for education        Return to clinic in 6 months    Interval history (7/2025, in person):   Possible waning  effect x last 2 weeks. Feels a mild abd pain on the right side.   Just got Remicade infusion this morning.   Having 1 solid BM daily.   No weight loss.   Tolerating all foods, but does have smaller amounts of certain foods including popcorn. Does eat salads.      Hands, ankles, feet, toes  -- mild arthralgias.     IBD HISTORY  Age at diagnosis: 37 (2023)  Extent of endoscopic disease: mainly small intestine but histologic colon as well on random biopsies  Extent of histologic disease: inflammatory/stricturing  Prior IBD Medications: none    Current IBD Medications: infliximab    Most Recent Endoscopy and Results:   Colonoscopy 4/7/23        HBI:  Overall patient well being (prior day): 0 (Very well)  Abdominal pain (prior day): 0 (None)  Number of liquid or soft stools (prior day): 0 (1 point per stool)  Abdominal mass on exam: 0 (None)  Complications (1 point for each): 0  Neuropathy: bottom of feet achy burny but improving    Remission <5  Mild activity 5-7  Moderate activity 8-16  Severe > 16    Extraintestinal manifestations (anytime during the course of disease)  L3: ileocolonic     DRUG MONITORING    Biologic concentration:   IFX in process from today.   Infliximab 9/27/2023, concentration 7.7, antibodies < 3.1.     Misbah Classification  AGE AT DIAGNOSIS: A2 between 17 and 40 y  CURRENT DISEASE LOCATION: L3: ileocolonic  L4 upper GI: NO  DISEASE BEHAVIOR (since disease onset): B2: stricturing  Perianal disease: NO    Constitutional symptoms:  Fever NO  Weight loss NO    Other GI symptoms present : none        PHYSICAL EXAMINATION:  Constitutional: aaox3, cooperative, pleasant, not dyspneic/diaphoretic, no acute distress  Vitals reviewed: /75   Pulse 77   Ht 1.829 m (6')   Wt 68.5 kg (151 lb)   SpO2 99%   BMI 20.48 kg/m    Wt:   Wt Readings from Last 2 Encounters:   07/02/24 68.5 kg (151 lb)   01/26/24 66.9 kg (147 lb 6.4 oz)      Eyes: Sclera anicteric/injected  Ears/nose/mouth/throat: MMM  Neck:  supple  CV: No edema  Respiratory: Unlabored breathing  Lymph: No axillary, submandibular, supraclavicular or inguinal lymphadenopathy  Abd:  Nondistended, soft, no hepatosplenomegaly, nontender, no peritoneal signs  Skin: warm, perfused, no jaundice  Psych: Normal affect  MSK: Normal gait    LABS:  P 6/2024: 452  IFX level in process    IMAGING:    MRE 5/2024:          IMPRESSION:   When compared with MRE dated 12/12/2023, there is no significant  change in acute inflammation with chronic fibrosis involving 15cm of  the terminal ileum. There is luminal narrowing without upstream  dilatation.    MR Enterography 12/12/23  EXAMINATION: MR ENTEROGRAPHY W/O AND W CONTRAST, 12/12/2023 10:12 AM     TECHNIQUE:  Multiplanar, multisequence MRI imaging of the abdomen and  pelvis was obtained using MR enterography protocol without and with  intravenous gadolinium. Contrast dose: 7 mL Gadavist     COMPARISON: 5/3/2023     HISTORY: please assess for Crohn's activity; Crohn's disease with  complication, unspecified gastrointestinal tract location (H)     FINDINGS:     Lower thorax: The lung bases are clear.     Abdomen and Pelvis:   There is approximately 20 cm of mucosal hyperenhancement with  associated bowel wall thickening in the distal ileum (series 18 image  11 through 28). Additionally, there is associated luminal narrowing  with these regions of small bowel inflammation. No evidence of abscess  or fistula. The large bowel is unremarkable. There is a large colonic  stool burden. No evidence of bowel obstruction. There is some mildly  hyperenhancing bowel in the left upper abdomen, however this is likely  related to bowel underdistention rather than acute inflammation.     The liver, spleen, pancreas, adrenal glands, and kidneys are  unremarkable. Tiny simple left renal cortical cysts. Layering biliary  sludge. There are multiple prominent in mildly enlarged mesenteric  lymph nodes, which are predominantly seen in the  right lower quadrant  and are likely reactive.     Bones: No suspicious or aggressive appearing bone lesions. T12  vertebral body hemangioma.                                                                      IMPRESSION:  1. Long segment of acute inflammation of the ileum with associated  luminal narrowing, which is not significantly changed compared to  prior exam of 5/3/2023.   2. Mild mesenteric lymphadenopathy, likely reactive.    PERTINENT PAST MEDICAL HISTORY:  Past Medical History:   Diagnosis Date    Anemia 2/1/2023    Crohn's disease       PREVIOUS SURGERIES:  Past Surgical History:   Procedure Laterality Date    COLONOSCOPY N/A 04/07/2023    Procedure: COLONOSCOPY, WITH BIOPSY;  Surgeon: Lamont Seaman MD;  Location: [facility] OR    LAPAROSCOPIC APPENDECTOMY N/A 02/22/2023    STILL HAS APPENDIX    LASIK  1/1/2019       ALLERGIES:     Allergies   Allergen Reactions    Other Food Allergy Angioedema     carrots       PERTINENT MEDICATIONS:    Current Outpatient Medications:     CALCIUM-VITAMIN D PO, Take 1 tablet by mouth daily, Disp: , Rfl:     carboxymethylcellulose PF (CARBOXYMETHYLCELLULOSE SODIUM) 0.5 % ophthalmic solution, Place 1 drop into both eyes 4 times daily Preservative free artificial tears, single use vials., Disp: 400 each, Rfl: 11    FERROUS BISGLYCINATE CHELATE PO, Take 1 tablet by mouth three times a week, Disp: , Rfl:     SOCIAL HISTORY:  Social History     Socioeconomic History    Marital status:      Spouse name: Not on file    Number of children: Not on file    Years of education: Not on file    Highest education level: Not on file   Occupational History    Not on file   Tobacco Use    Smoking status: Never    Smokeless tobacco: Never   Vaping Use    Vaping status: Never Used   Substance and Sexual Activity    Alcohol use: Not Currently     Comment: 0 drinks per week    Drug use: Yes     Types: Marijuana     Comment: Occasional    Sexual activity: Yes     Partners: Female      Birth control/protection: Condom   Other Topics Concern    Parent/sibling w/ CABG, MI or angioplasty before 65F 55M? No   Social History Narrative    Not on file     Social Determinants of Health     Financial Resource Strain: Low Risk  (1/26/2024)    Financial Resource Strain     Within the past 12 months, have you or your family members you live with been unable to get utilities (heat, electricity) when it was really needed?: No   Food Insecurity: Low Risk  (1/26/2024)    Food Insecurity     Within the past 12 months, did you worry that your food would run out before you got money to buy more?: No     Within the past 12 months, did the food you bought just not last and you didn t have money to get more?: No   Transportation Needs: Low Risk  (1/26/2024)    Transportation Needs     Within the past 12 months, has lack of transportation kept you from medical appointments, getting your medicines, non-medical meetings or appointments, work, or from getting things that you need?: No   Physical Activity: Not on file   Stress: Not on file   Social Connections: Not on file   Interpersonal Safety: Not on file   Housing Stability: Low Risk  (1/26/2024)    Housing Stability     Do you have housing? : Yes     Are you worried about losing your housing?: No       FAMILY HISTORY:  Family History   Problem Relation Age of Onset    Melanoma Mother     Skin Cancer Mother     Hyperlipidemia Mother     Thyroid Disease Mother     Skin Cancer Father         SCC and BCC    Skin Cancer Maternal Grandmother     Melanoma Paternal Grandmother     Colon Cancer No family hx of        Past/family/social history reviewed and no changes

## 2024-07-02 NOTE — PATIENT INSTRUCTIONS
PLAN  --IFX trough level. Optimize if below 10.   --Referral to colorectal surgery for education purposes regarding ICR. This is a reasonable next step vs. another medication.   --Submit pre-authorization for Skyrizi if we decide to switch therapies in the future  --------[facility] pharmacy

## 2024-07-05 ENCOUNTER — TELEPHONE (OUTPATIENT)
Dept: GASTROENTEROLOGY | Facility: CLINIC | Age: 39
End: 2024-07-05
Payer: COMMERCIAL

## 2024-07-05 NOTE — TELEPHONE ENCOUNTER
COPAY CARD OBTAINED    Medication: SKYRIZI 360 MG/2.4ML SC SOCT  Sponsor: [employer]  Member ID:    BIN:    PCN:    Group:    Expected Copay: $    Copay card Monthly Max Amount: $ 1,500  Copay card Annual Amount: $ 18,000  Talked with patient about approval and signing up for a copay card

## 2024-07-05 NOTE — TELEPHONE ENCOUNTER
PA Initiation    Medication: SKYRIZI 360 MG/2.4ML SC SOCT  Insurance Company: [employer] - Phone [phone] Fax [phone]  Pharmacy Filling the Rx: [facility] [place]  Filling Pharmacy Phone:    Filling Pharmacy Fax:    Start Date: 7/5/2024  [id number]

## 2024-07-05 NOTE — TELEPHONE ENCOUNTER
Prior Authorization Approval    Medication: SKYRIZI 360 MG/2.4ML SC SOCT  Authorization Effective Date: 7/5/2024  Authorization Expiration Date: 7/5/2025  Approved Dose/Quantity: 2.4/56  Reference #: [id number]   Insurance Company: [employer] - Phone [phone] Fax [phone]  Expected CoPay: $    CoPay Card Available:      Financial Assistance Needed:    Which Pharmacy is filling the prescription: [facility] [place]  Pharmacy Notified:    Patient Notified:

## 2024-07-08 ENCOUNTER — PATIENT OUTREACH (OUTPATIENT)
Dept: GASTROENTEROLOGY | Facility: CLINIC | Age: 39
End: 2024-07-08
Payer: COMMERCIAL

## 2024-07-08 ENCOUNTER — DOCUMENTATION ONLY (OUTPATIENT)
Dept: PHARMACY | Facility: CLINIC | Age: 39
End: 2024-07-08
Payer: COMMERCIAL

## 2024-07-08 LAB
INFLIXIMAB AB SERPL IA-MCNC: <3.1 U/ML
INFLIXIMAB SERPL-MCNC: 9.5 UG/ML

## 2024-07-08 NOTE — PROGRESS NOTES
Skilled Nurse visit in the Patient Home to administer Remicade on 7/2/24.  No recent elevated temperature, fever, chills, productive cough, coughing for 3 weeks or longer or hemoptysis, abnormal vital signs, night sweats, chest pain. No  decrease in your appetite, unexplained weight loss or fatigue.  No other new onset medical symptoms.  Peripheral IV. 1 attempt. Labs drawn: Labs not drawn as they were drawn earlier prior to infusion. Infusion completed without complication or reaction. Pt reports therapy iseffective in managing symptoms related to therapy.    Annetta OBANDO RN, BSN  [facility]  [phone]

## 2024-07-08 NOTE — TELEPHONE ENCOUNTER
IFX level resulted:      Dr. Suresh will discuss at IBD Interdisciplinary Conference to determine if patient will transition to [facility].

## 2024-07-18 ENCOUNTER — TELEPHONE (OUTPATIENT)
Dept: SURGERY | Facility: CLINIC | Age: 39
End: 2024-07-18
Payer: COMMERCIAL

## 2024-07-18 NOTE — TELEPHONE ENCOUNTER
Patient confirmed scheduled appointment:  Date: 7/30/24  Time: 3:30 pm  Visit type: New Patient  Provider: Dr. Osorio  Location: [facility]  Testing/imaging: n/a  Additional notes: ok to reschedule 9/3 appt for an appt within 2-3 weeks per Rona RASHID RN

## 2024-07-25 NOTE — PROGRESS NOTES
Colon and Rectal Surgery Clinic Note    RE: Karlos Terrazas.  : 1985.  TIMMY: 2024.    Reason for visit: Crohn's disease.    HPI: Karlos Terrazas is a 38 year old male who presents today for Crohn's disease. He has a past medical history of anemia. He was diagnosed with moderate to severe Crohn's ileitis (multifocal with stricturing) at the age of 37. Currently on infliximab every 6 weeks. Colonoscopy on 2023 demonstrated terminal ileum stricturing. Surgical Pathology demonstrated active ileitis with granulomas. Rare punctate red lesions with a white center were noted in the left colon. Pathology showed active colitis. MRE on 2023 demonstrated a long segment of acute inflammation of the ileum with associated luminal narrowing. There was no significant change on most recent MRE on 2024.     Today Karlos feels fine. He reports his symptoms are manageable. He has regular Bms and he is able to tolerate his diet.     Colonoscopy (2023):  Findings:       No aphthae or ulcerations. The IC valve is open; however, advancing the        scope beyond ~ 3 cm into the terminal ileum was not possible due to        either stricture or extrinsic compression. Biopsies were taken in the        accessible terminal ileum.        Essentially unremarkable mucosa, although rare punctate red lesions with        whitish center were noted in the left colon. Biopsies of the colon        mucosa were done.        The area of the appendix was edematous.                                                                                    Impression:            - The findings are not diagnostic of Crohn's disease                          and could be consistent with recent appendicitis.                          However, examination of the terminal ileum was limited.       Surgical Pathology (2023):  Final Diagnosis   A.  TERMINAL ILEUM, BIOPSY:  - Active ileitis with granulomas; see comment    B.  COLON, RANDOM,  BIOPSY:  - Patchy active colitis with erosion; see comment   Electronically signed by Julien Parikh DO on 4/11/2023 at 10:00 AM   Comment  [facility]   Sections of ileal mucosa show acute cryptitis, architectural distortion, and epithelioid granulomas.  Multiple deeper sections examined.  Sections of random colon show patchy active colitis with crypt abscesses and erosion.  These nonspecific findings are compatible with Crohn's disease in the proper clinical setting.  An infection or drug injury may also produce this pattern of injury.     MRE (12/12/2023):  IMPRESSION:  1. Long segment of acute inflammation of the ileum with associated  luminal narrowing, which is not significantly changed compared to  prior exam of 5/3/2023.   2. Mild mesenteric lymphadenopathy, likely reactive.    MRE (5/24/2024):  IMPRESSION:   When compared with MRE dated 12/12/2023, there is no significant  change in acute inflammation with chronic fibrosis involving 15cm of  the terminal ileum. There is luminal narrowing without upstream  dilatation.    Medical history:  Anemia     Surgical history:  Laparoscopic appendectomy     Family history:  Family History   Problem Relation Age of Onset    Melanoma Mother     Skin Cancer Mother     Hyperlipidemia Mother     Thyroid Disease Mother     Skin Cancer Father         SCC and BCC    Skin Cancer Maternal Grandmother     Melanoma Paternal Grandmother     Colon Cancer No family hx of        Medications:  Current Outpatient Medications   Medication Sig Dispense Refill    CALCIUM-VITAMIN D PO Take 1 tablet by mouth daily      carboxymethylcellulose PF (CARBOXYMETHYLCELLULOSE SODIUM) 0.5 % ophthalmic solution Place 1 drop into both eyes 4 times daily Preservative free artificial tears, single use vials. 400 each 11    FERROUS BISGLYCINATE CHELATE PO Take 1 tablet by mouth three times a week         Allergies:  Allergies   Allergen Reactions    Other Food Allergy Angioedema     carrots       Social  history:   Social History     Tobacco Use    Smoking status: Never    Smokeless tobacco: Never   Substance Use Topics    Alcohol use: Not Currently     Comment: 0 drinks per week     Marital status: .    ROS:  A complete review of systems was performed with the patient and all systems negative except as per HPI.    Physical Examination:  Exam was chaperoned by Danny Parks, EMT-P  /74 (BP Location: Left arm, Patient Position: Sitting, Cuff Size: Adult Regular)   Pulse 83   SpO2 96%   General: Well hydrated. No acute distress.  CV: RRR  Lung: Non-labored breathing on RA  Abdomen: Soft, NT. No inguinal adenopathy palpated.      ASSESSMENT    This is a 38 year old M with a long stricture at the , who has been recommended for surgical intervention at the IBD care conference. I believe laparoscopic ileocolic resection is indicated. He asked appropriate questions, which were answered. Risks, benefits, and alternatives of operative treatment were thoroughly discussed with the patient, he understands these well and agrees to proceed. He would prefer to wait and think about it and contact our clinic when ready.    All pertinent labs and imaging were personally reviewed by me.      PLAN  - To OR for laparoscopic ileocecectomy with KONO-S anastomosis to be scheduled at pt's [facility]  - [facility]/abx bowel prep  - Preop teaching and eval      45 minutes spent on the date of the encounter doing chart review, history and exam, imaging review, documentation and further activities as noted above.      Talat Osorio MD    Division of Colon and Rectal Surgery  [facility]    Referring Provider:  Priscilla Suresh MD  [address] [address]     Primary Care Provider:  Mary Velasquez

## 2024-07-26 ENCOUNTER — VIRTUAL VISIT (OUTPATIENT)
Dept: GASTROENTEROLOGY | Facility: CLINIC | Age: 39
End: 2024-07-26
Attending: INTERNAL MEDICINE
Payer: COMMERCIAL

## 2024-07-26 DIAGNOSIS — K50.018 CROHN'S DISEASE OF ILEUM WITH OTHER COMPLICATION (H): Primary | ICD-10-CM

## 2024-07-26 NOTE — PROGRESS NOTES
Medication Therapy Management (MTM) Encounter    ASSESSMENT:                            Medication Adherence/Access: See below for considerations    Crohn's Disease: Karlos would benefit from meeting with colorectal surgery as recommended by the IBD disciplinary conference team, which is planned for next week. Agree that based on last infliximab level, this could be further optimized. However, I would like to point out that he never dosed at the 5 mg/kg every 6 week dosing strategy. The last level is most reflective of dosing at 7.5 mg/kg every eight weeks. It is still reasonable to consider increasing to 10 mg/kg every six weeks based on this trough, however, if that is the plan he will need an updated therapy plan.    PLAN:                            Follow-up with colorectal surgery as planned.  Alexandria will confirm dosing plan with the IBD team and arrange updating your therapy plan.   -- staff message sent    Follow-up:    -- with MTM in 3 months, or sooner if needed    SUBJECTIVE/OBJECTIVE:                          Karlos Terrazas is a 38 year old male seen for a follow-up visit.       Reason for visit: medication check-in    Allergies/ADRs: None  Tobacco: He reports that he has never smoked. He has never used smokeless tobacco.  -- of note, he is part of the infliximab OPTIMIZE trial    Medication Adherence/Access: no reported concerns    Crohn's Disease:   Infliximab 5 mg/kg every 6 weeks (but dosed at 8 weeks)    His case was recently discussed at our IBD care conference and the team recommendation was to move the colorectal surgery consult up for consideration of surgical removal of the stricture, and optimizing infliximab, as opposed to switching to Skyrizi at this time. His last infliximab infusion was on 7/2/2024. He is scheduled to meet with CRS on 7/30/2024.    Per discussion with I:  3/27/24 : orders updated to 500mg 8mg/kg q8wks  6/14: Dr Suresh updated to 5mg/kg q6wks with IFX level  7/9: updated  orders to 10mg/kg q6wks    Prior Infusion Schedule:  7/2/2024 (last infusion @ 7 weeks 7 days) [300 mg (5 mg/kg)]  5/8/2024 (8 weeks) [500 mg (7.5 mg/kg)]  3/13/2024 (8 weeks) [500 mg (7.5 mg/kg)]  1/17/2024 (8 weeks) [500 mg (7.5 mg/kg)]  11/22/2023 (8 weeks) [300 mg (5 mg/kg)]  9/27/2023 [700 mg (10 mg/kg)]    Infliximab level:  Lab Results   Component Value Date    IFXCON 9.5 07/02/2024    IFXABY <3.1 07/02/2024     Lab Results   Component Value Date    CALPRF 452.0 (H) 06/28/2024     Last provider visit: 7/2/2024 with Dr. Suresh  Next provider visit: 2/6/2025 with Dr. Leo  Last Quantiferon: 5/2024    ----------------    I spent 15 minutes with this patient today. All changes were made via collaborative practice agreement with Dr. Leo. A copy of the visit note was provided to the patient's provider(s).    A summary of these recommendations was sent via [email or web address].    Alexandria Romero PharmD, BCACP  MTM Pharmacist   [facility]   Phone: [phone]    Telemedicine Visit Details  Type of service:  Telephone visit  Start Time:  8:42 AM  End Time: 8:57 AM     Medication Therapy Recommendations  Crohn's disease (H)    Rationale: Dose too low - Dosage too low - Effectiveness   Status: Contact Provider - Awaiting Response

## 2024-07-26 NOTE — Clinical Note
7/26/2024      Karlos Terrazas  [address] [address]      Dear Colleague,    Thank you for referring your patient, Karlos Terrazas, to the [facility]. Please see a copy of my visit note below.    Medication Therapy Management (MTM) Encounter    ASSESSMENT:                            Medication Adherence/Access: {adherencechoices:413510}    ***:  ***      PLAN:                                Follow-up: {followuptest2:958589}    SUBJECTIVE/OBJECTIVE:                          Karlos Terrazas is a 38 year old male seen for {mtmvisit:995302}     Reason for visit: ***.    Allergies/ADRs: {1/2/3/4/5:560998}  Past Medical History: {1/2/3/4/5:671627}  Tobacco: He reports that he has never smoked. He has never used smokeless tobacco.  Alcohol: {ALCOHOL CONSUMPTION HX:147183}    Medication Adherence/Access: {fumedadherence:718564}    Crohn's Disease:   Infliximab 5 mg/kg every 6 weeks (but dosed at     His case was recently discussed at our IBD care conference and the team recommendation was to move the colorectal surgery consult up for consideration of surgical removal of the stricture, and optimizing infliximab, as opposed to switching to Skyrizi at this time. His las infliximab infusion was on 7/2/2024.    Prior Infusion Schedule:      Infliximab level:  Lab Results   Component Value Date    IFXCON 9.5 07/02/2024    IFXABY <3.1 07/02/2024     Last provider visit:  Next provider visit:  Last Quantiferon:    Disease activity on current therapy:  Last endoscopic evaluation/MRE:    ----------------  {SARAI?:244965}    I spent 15 minutes with this patient today. { :701058}. A copy of the visit note was provided to the patient's provider(s).    A summary of these recommendations was sent via [employer].    Alexandria EllingtonD, BCACP  MTM Pharmacist   [facility]   Phone: [phone]    Telemedicine Visit Details  Type of service:  Telephone visit  Start Time:  {video/phone visit start time:951797}  End Time: 8:57 AM     Medication Therapy Recommendations  No medication therapy recommendations to display         Again, thank you for allowing me to participate in the care of your patient.        Sincerely,        Alexandria Romero RPH

## 2024-07-30 ENCOUNTER — OFFICE VISIT (OUTPATIENT)
Dept: SURGERY | Facility: CLINIC | Age: 39
End: 2024-07-30
Attending: INTERNAL MEDICINE
Payer: COMMERCIAL

## 2024-07-30 VITALS — DIASTOLIC BLOOD PRESSURE: 74 MMHG | HEART RATE: 83 BPM | SYSTOLIC BLOOD PRESSURE: 114 MMHG | OXYGEN SATURATION: 96 %

## 2024-07-30 DIAGNOSIS — K50.00 CROHN'S DISEASE OF SMALL INTESTINE WITHOUT COMPLICATION (H): Primary | ICD-10-CM

## 2024-07-30 DIAGNOSIS — K50.018 CROHN'S DISEASE OF ILEUM WITH OTHER COMPLICATION (H): ICD-10-CM

## 2024-07-30 PROCEDURE — 99204 OFFICE O/P NEW MOD 45 MIN: CPT | Performed by: SURGERY

## 2024-07-30 ASSESSMENT — PAIN SCALES - GENERAL: PAINLEVEL: NO PAIN (0)

## 2024-07-30 NOTE — LETTER
2024       RE: Karlos Terrazas  [address] [facility] [address]     Dear Colleague,    Thank you for referring your patient, Karlos Terrazas, to the [facility] COLON AND RECTAL SURGERY CLINIC [facility]. Please see a copy of my visit note below.    Colon and Rectal Surgery Clinic Note    RE: Karlos Terrazas.  : 1985.  TIMMY: 2024.    Reason for visit: Crohn's disease.    HPI: Karlos Terrazas is a 38 year old male who presents today for Crohn's disease. He has a past medical history of anemia. He was diagnosed with moderate to severe Crohn's ileitis (multifocal with stricturing) at the age of 37. Currently on infliximab every 6 weeks. Colonoscopy on 2023 demonstrated terminal ileum stricturing. Surgical Pathology demonstrated active ileitis with granulomas. Rare punctate red lesions with a white center were noted in the left colon. Pathology showed active colitis. MRE on 2023 demonstrated a long segment of acute inflammation of the ileum with associated luminal narrowing. There was no significant change on most recent MRE on 2024.     Today Karlos feels fine. He reports his symptoms are manageable. He has regular Bms and he is able to tolerate his diet.     Colonoscopy (2023):  Findings:       No aphthae or ulcerations. The IC valve is open; however, advancing the        scope beyond ~ 3 cm into the terminal ileum was not possible due to        either stricture or extrinsic compression. Biopsies were taken in the        accessible terminal ileum.        Essentially unremarkable mucosa, although rare punctate red lesions with        whitish center were noted in the left colon. Biopsies of the colon        mucosa were done.        The area of the appendix was edematous.                                                                                    Impression:            - The findings are not diagnostic of  Crohn's disease                          and could be consistent with recent appendicitis.                          However, examination of the terminal ileum was limited.       Surgical Pathology (4/7/2023):  Final Diagnosis   A.  TERMINAL ILEUM, BIOPSY:  - Active ileitis with granulomas; see comment    B.  COLON, RANDOM, BIOPSY:  - Patchy active colitis with erosion; see comment   Electronically signed by Julien Parikh DO on 4/11/2023 at 10:00 AM   Comment  [facility]   Sections of ileal mucosa show acute cryptitis, architectural distortion, and epithelioid granulomas.  Multiple deeper sections examined.  Sections of random colon show patchy active colitis with crypt abscesses and erosion.  These nonspecific findings are compatible with Crohn's disease in the proper clinical setting.  An infection or drug injury may also produce this pattern of injury.     MRE (12/12/2023):  IMPRESSION:  1. Long segment of acute inflammation of the ileum with associated  luminal narrowing, which is not significantly changed compared to  prior exam of 5/3/2023.   2. Mild mesenteric lymphadenopathy, likely reactive.    MRE (5/24/2024):  IMPRESSION:   When compared with MRE dated 12/12/2023, there is no significant  change in acute inflammation with chronic fibrosis involving 15cm of  the terminal ileum. There is luminal narrowing without upstream  dilatation.    Medical history:  Anemia     Surgical history:  Laparoscopic appendectomy     Family history:  Family History   Problem Relation Age of Onset    Melanoma Mother     Skin Cancer Mother     Hyperlipidemia Mother     Thyroid Disease Mother     Skin Cancer Father         SCC and BCC    Skin Cancer Maternal Grandmother     Melanoma Paternal Grandmother     Colon Cancer No family hx of        Medications:  Current Outpatient Medications   Medication Sig Dispense Refill    CALCIUM-VITAMIN D PO Take 1 tablet by mouth daily      carboxymethylcellulose PF (CARBOXYMETHYLCELLULOSE SODIUM)  0.5 % ophthalmic solution Place 1 drop into both eyes 4 times daily Preservative free artificial tears, single use vials. 400 each 11    FERROUS BISGLYCINATE CHELATE PO Take 1 tablet by mouth three times a week         Allergies:  Allergies   Allergen Reactions    Other Food Allergy Angioedema     carrots       Social history:   Social History     Tobacco Use    Smoking status: Never    Smokeless tobacco: Never   Substance Use Topics    Alcohol use: Not Currently     Comment: 0 drinks per week     Marital status: .    ROS:  A complete review of systems was performed with the patient and all systems negative except as per HPI.    Physical Examination:  Exam was chaperoned by Danny Parks, EMT-P  /74 (BP Location: Left arm, Patient Position: Sitting, Cuff Size: Adult Regular)   Pulse 83   SpO2 96%   General: Well hydrated. No acute distress.  CV: RRR  Lung: Non-labored breathing on RA  Abdomen: Soft, NT. No inguinal adenopathy palpated.      ASSESSMENT    This is a 38 year old M with a long stricture at the TI, who has been recommended for surgical intervention at the IBD care conference. I believe laparoscopic ileocolic resection is indicated. He asked appropriate questions, which were answered. Risks, benefits, and alternatives of operative treatment were thoroughly discussed with the patient, he understands these well and agrees to proceed. He would prefer to wait and think about it and contact our clinic when ready.    All pertinent labs and imaging were personally reviewed by me.      PLAN  - To OR for laparoscopic ileocecectomy with KONO-S anastomosis to be scheduled at pt's convenience  - [facility]/abx bowel prep  - Preop teaching and eval      45 minutes spent on the date of the encounter doing chart review, history and exam, imaging review, documentation and further activities as noted above.    Referring Provider:  Priscilla Suresh MD  [address] [address]      Primary Care Provider:  Mary Velasquez      Again, thank you for allowing me to participate in the care of your patient.      Sincerely,    Talat Osorio MD

## 2024-07-30 NOTE — NURSING NOTE
Chief Complaint   Patient presents with    Consult       Vitals:    07/30/24 1529   BP: 114/74   BP Location: Left arm   Patient Position: Sitting   Cuff Size: Adult Regular   Pulse: 83   SpO2: 96%       There is no height or weight on file to calculate BMI.    Danny Parks EMT-P

## 2024-08-02 RX ORDER — INFLIXIMAB 100 MG/10ML
10 INJECTION, POWDER, LYOPHILIZED, FOR SOLUTION INTRAVENOUS
Status: SHIPPED
Start: 2024-08-02

## 2024-08-02 NOTE — PATIENT INSTRUCTIONS
"Recommendations from today's MTM visit:                                                       Follow-up with colorectal surgery as planned.  Alexandria will confirm dosing plan with the IBD team and arrange updating your therapy plan.       Follow-up:    -- with MTM in 3 months, or sooner if needed    It was great speaking with you today.  I value your experience and would be very thankful for your time in providing feedback in our clinic survey. In the next few days, you may receive an email or text message from [facility] with a link to a survey related to your  clinical pharmacist.\"     To schedule another MTM appointment, please call the clinic directly or you may call the MTM scheduling line at [phone].    My Clinical Pharmacist's contact information:                                                      Please feel free to contact me with any questions or concerns you have.      Alexandria EllingtonD, BCACP  MTM Pharmacist   [facility]   Phone: [phone]    "

## 2024-08-05 ENCOUNTER — TELEPHONE (OUTPATIENT)
Dept: GASTROENTEROLOGY | Facility: CLINIC | Age: 39
End: 2024-08-05
Payer: COMMERCIAL

## 2024-08-05 NOTE — TELEPHONE ENCOUNTER
----- Message from Alexandria Romero sent at 8/4/2024  5:21 PM CDT -----  Regarding: FW: infliximab dose  Hi Kaz,    Would you mind updating his therapy plan when you are able?    When I talked to [facility], it seemed like they had the 10 mg/kg every six week dose as his current dose.    Thank you,  LT  ----- Message -----  From: Priscilla Suresh MD  Sent: 8/4/2024   1:33 PM CDT  To: Alexandria Romero [facility]; [facility] Ibd Nurses  Subject: RE: infliximab dose                              Hi there,    Thanks for checking in about this. He was in the idose group of the OPTIMIZE trial, so he was on a variable schedule. Yes, I think we should stick to 10 mg/kg every 6 weeks.  Surgery is still the plan.    Thanks again,  E  ----- Message -----  From: Alexandria Romero [facility]  Sent: 8/2/2024   8:32 AM CDT  To: Priscilla Suresh MD; [facility] Ibd Nurses  Subject: infliximab dose                                  Hi E,    I met with Karlos after the IBD disciplinary conference. I noticed a lot of the chart notes, as well as his active therapy plan, say that his current infliximab dose is 5 mg/kg [id number].     However, he has never dosed at this interval. I connected with home infusion, who said the orders were updated to 10 mg/kg every six week earlier in July. I wanted to confirm that based on the level below (drawn as an 8 week trough), you want to move to 10 mg/kg every six weeks?     If so, please let us know so we can update his therapy plan.    Thank you!  LLT      Here is a collection of pertinent information:     Prior Infusion Schedule:  7/2/2024 (last infusion @ 7 weeks 7 days) [300 mg = 5 mg/kg]  5/8/2024 (8 weeks) [500 mg = 7.5 mg/kg]  3/13/2024 (8 weeks) [500 mg = 7.5 mg/kg]  1/17/2024 (8 weeks) [500 mg = 7.5 mg/kg]  11/22/2023 (8 weeks) [300 mg = 5 mg/kg]  9/27/2023 [700 mg = 10 mg/kg]    Infliximab level:  Lab Results  Component Value Date   IFXCON 9.5 07/02/2024   IFXABY <3.1 07/02/2024    Per discussion with [facility]:  3/27/24  : orders updated to 500mg 8mg/kg q8wks  6/14: Dr Suresh updated to 5mg/kg q6wks with IFX level  7/9: updated orders to 10mg/kg q6wks

## 2024-08-05 NOTE — TELEPHONE ENCOUNTER
Therapy plan updated. Inbasket message routed to [facility] and [facility].

## 2024-08-13 ENCOUNTER — DOCUMENTATION ONLY (OUTPATIENT)
Dept: PHARMACY | Facility: CLINIC | Age: 39
End: 2024-08-13
Payer: COMMERCIAL

## 2024-08-13 NOTE — PROGRESS NOTES
Skilled Nurse visit in the Patient Home to administer Remicade today. Dose was increased from 300 mg to 700 mg. No recent elevated temperature, fever, chills, productive cough, coughing for 3 weeks or longer or hemoptysis, abnormal vital signs, night sweats, chest pain. No  decrease in your appetite, unexplained weight loss or fatigue.  No other new onset medical symptoms.  Peripheral IV. 1 attempt. Labs drawn: None. Infusion completed without complication or reaction. Pt reports therapy is effective in managing symptoms related to therapy.     Annetta OBANDO RN, BSN  [facility]  [phone]

## 2024-08-21 ENCOUNTER — TELEPHONE (OUTPATIENT)
Dept: SURGERY | Facility: CLINIC | Age: 39
End: 2024-08-21
Payer: COMMERCIAL

## 2024-08-21 DIAGNOSIS — K50.00 CROHN'S DISEASE OF SMALL INTESTINE WITHOUT COMPLICATION (H): Primary | ICD-10-CM

## 2024-08-21 RX ORDER — NEOMYCIN SULFATE 500 MG/1
1000 TABLET ORAL EVERY 6 HOURS
Qty: 6 TABLET | Refills: 0 | Status: SHIPPED | OUTPATIENT
Start: 2024-08-21

## 2024-08-21 RX ORDER — POLYETHYLENE GLYCOL 3350 17 G/17G
POWDER, FOR SOLUTION ORAL
Qty: 238 G | Refills: 0 | Status: SHIPPED | OUTPATIENT
Start: 2024-08-21

## 2024-08-21 RX ORDER — METRONIDAZOLE 500 MG/1
500 TABLET ORAL EVERY 6 HOURS
Qty: 3 TABLET | Refills: 0 | Status: SHIPPED | OUTPATIENT
Start: 2024-08-21

## 2024-08-21 RX ORDER — ONDANSETRON 4 MG/1
4 TABLET, FILM COATED ORAL EVERY 6 HOURS
Qty: 3 TABLET | Refills: 0 | Status: SHIPPED | OUTPATIENT
Start: 2024-08-21

## 2024-08-21 RX ORDER — MAGNESIUM CARB/ALUMINUM HYDROX 105-160MG
TABLET,CHEWABLE ORAL
Qty: 296 ML | Refills: 0 | Status: SHIPPED | OUTPATIENT
Start: 2024-08-21

## 2024-08-21 NOTE — TELEPHONE ENCOUNTER
Patient called and left voicemail. On VM patient states that he has decided that he would like to move forward with Dr. Osorio. Patient left direct number for call back [phone].    Silvana Carrion on 8/21/2024 at 12:55 PM

## 2024-09-05 ENCOUNTER — ENROLLMENT (OUTPATIENT)
Dept: HOME HEALTH SERVICES | Facility: HOME HEALTH | Age: 39
End: 2024-09-05
Payer: COMMERCIAL

## 2024-09-17 ENCOUNTER — PRE VISIT (OUTPATIENT)
Dept: SURGERY | Facility: CLINIC | Age: 39
End: 2024-09-17

## 2024-09-18 NOTE — PROGRESS NOTES
AUTH REQUIRED DAY ORDERS (or CHANGE IN ORDERS) ARE RECEIVED. PLEASE NOTIFY PA TEAM ONCE ORDERS RECEIVED.    07/20/2023: PT HAS [employer] AND [employer] COVERAGE. SV

## 2024-09-20 ENCOUNTER — TELEPHONE (OUTPATIENT)
Dept: SURGERY | Facility: CLINIC | Age: 39
End: 2024-09-20

## 2024-09-20 ENCOUNTER — IMMUNIZATION (OUTPATIENT)
Dept: FAMILY MEDICINE | Facility: CLINIC | Age: 39
End: 2024-09-20
Payer: COMMERCIAL

## 2024-09-20 PROCEDURE — 90471 IMMUNIZATION ADMIN: CPT

## 2024-09-20 PROCEDURE — 90656 IIV3 VACC NO PRSV 0.5 ML IM: CPT

## 2024-09-20 PROCEDURE — 91320 SARSCV2 VAC 30MCG TRS-SUC IM: CPT

## 2024-09-20 PROCEDURE — 90480 ADMN SARSCOV2 VAC 1/ONLY CMP: CPT

## 2024-09-20 NOTE — TELEPHONE ENCOUNTER
Called patient to schedule surgery with Dr. Osorio    Spoke with: Patient     Date of Surgery: 12/05/2024    Estimated Arrival time Discussed with Patient:  No    Location of surgery: [facility]     Pre-Op H&P: PAC 11/21 at 8:15AM    WOC: No     Labs: Yes 11/21 at 9:30AM    Imaging: No     Post-Op Appt Date w/ NP/PA:  Irina Box APRN, CNP 12/19 at 1PM    Post-Op Appt Date w/ Surgeon:  Dr. Osorio 01/14/2024 at 1:30PM     Bowel Prep:  Yes  Full Prep with Antibiotics Sent via [employer] Message and via letter tab    Discussed with patient pre-op RN will call 2-3 days prior to surgery with arrival time and instructions:  Yes       Standard Surgery Packet Sent: Yes 09/20/24  via [employer] Message      Additional Comments: Offered surgery date of 11/13 on waitlist or 11/27 - patient will be traveling in late Nov and asked for early December surgery date rather than waitlist for 11/13.   Patient is requesting to be on cancellation list for Oct/early Nov.       All patients questions were answered and was instructed to review surgical packet and call back with any questions or concerns.       Luc Hernandez on 9/20/2024 at 4:35 PM

## 2024-09-23 NOTE — TELEPHONE ENCOUNTER
FUTURE VISIT INFORMATION      SURGERY INFORMATION:  Date: 12/5/24  Location: [facility] or  Surgeon:  Talat Osorio MD   Anesthesia Type:  general  Procedure: Laparoscopic ileocecectomy with KONO-S anastomosis   Consult: ov 7/30/24    RECORDS REQUESTED FROM:       Primary Care Provider: Mary Velasquez  - [facility]

## 2024-09-24 ENCOUNTER — LAB REQUISITION (OUTPATIENT)
Dept: LAB | Facility: CLINIC | Age: 39
End: 2024-09-24
Payer: COMMERCIAL

## 2024-09-24 ENCOUNTER — DOCUMENTATION ONLY (OUTPATIENT)
Dept: PHARMACY | Facility: CLINIC | Age: 39
End: 2024-09-24
Payer: COMMERCIAL

## 2024-09-24 DIAGNOSIS — K50.90 CROHN'S DISEASE, UNSPECIFIED, WITHOUT COMPLICATIONS (H): ICD-10-CM

## 2024-09-24 LAB
ALBUMIN SERPL BCG-MCNC: 4.3 G/DL (ref 3.5–5.2)
ALP SERPL-CCNC: 82 U/L (ref 40–150)
ALT SERPL W P-5'-P-CCNC: <5 U/L (ref 0–70)
AST SERPL W P-5'-P-CCNC: 17 U/L (ref 0–45)
BASOPHILS # BLD AUTO: 0 10E3/UL (ref 0–0.2)
BASOPHILS NFR BLD AUTO: 1 %
BILIRUB DIRECT SERPL-MCNC: <0.2 MG/DL (ref 0–0.3)
BILIRUB SERPL-MCNC: 0.4 MG/DL
CRP SERPL-MCNC: 4.31 MG/L
EOSINOPHIL # BLD AUTO: 0.1 10E3/UL (ref 0–0.7)
EOSINOPHIL NFR BLD AUTO: 3 %
ERYTHROCYTE [DISTWIDTH] IN BLOOD BY AUTOMATED COUNT: 12.6 % (ref 10–15)
HCT VFR BLD AUTO: 41.6 % (ref 40–53)
HGB BLD-MCNC: 14 G/DL (ref 13.3–17.7)
IMM GRANULOCYTES # BLD: 0 10E3/UL
IMM GRANULOCYTES NFR BLD: 0 %
LYMPHOCYTES # BLD AUTO: 1.7 10E3/UL (ref 0.8–5.3)
LYMPHOCYTES NFR BLD AUTO: 35 %
MCH RBC QN AUTO: 29.8 PG (ref 26.5–33)
MCHC RBC AUTO-ENTMCNC: 33.7 G/DL (ref 31.5–36.5)
MCV RBC AUTO: 89 FL (ref 78–100)
MONOCYTES # BLD AUTO: 0.5 10E3/UL (ref 0–1.3)
MONOCYTES NFR BLD AUTO: 9 %
NEUTROPHILS # BLD AUTO: 2.6 10E3/UL (ref 1.6–8.3)
NEUTROPHILS NFR BLD AUTO: 52 %
NRBC # BLD AUTO: 0 10E3/UL
NRBC BLD AUTO-RTO: 0 /100
PLATELET # BLD AUTO: 294 10E3/UL (ref 150–450)
PROT SERPL-MCNC: 7.1 G/DL (ref 6.4–8.3)
RBC # BLD AUTO: 4.7 10E6/UL (ref 4.4–5.9)
WBC # BLD AUTO: 4.9 10E3/UL (ref 4–11)

## 2024-09-24 PROCEDURE — 82040 ASSAY OF SERUM ALBUMIN: CPT | Performed by: INTERNAL MEDICINE

## 2024-09-24 PROCEDURE — 86140 C-REACTIVE PROTEIN: CPT | Performed by: INTERNAL MEDICINE

## 2024-09-24 PROCEDURE — 85004 AUTOMATED DIFF WBC COUNT: CPT | Performed by: INTERNAL MEDICINE

## 2024-09-24 NOTE — PROGRESS NOTES
Skilled Nurse visit in the Patient Home to administer Remicade (infliximab) 700 mg. No recent elevated temperature, fever, chills, productive cough, coughing for 3 weeks or longer or hemoptysis, abnormal vital signs, night sweats, chest pain. No decrease in your appetite, unexplained weight loss or fatigue. No other new onset medical symptoms. Current weight 150lb. Peripheral IV left AC, 1 attempt. Labs drawn hepatic panel, CBCD, CRPI. Infusion completed without complication or reaction. Pt reports therapy is effective in managing symptoms related to therapy.

## 2024-10-07 ENCOUNTER — HOME INFUSION (OUTPATIENT)
Dept: HOME HEALTH SERVICES | Facility: HOME HEALTH | Age: 39
End: 2024-10-07
Payer: COMMERCIAL

## 2024-10-09 ENCOUNTER — HOME INFUSION (OUTPATIENT)
Dept: HOME HEALTH SERVICES | Facility: HOME HEALTH | Age: 39
End: 2024-10-09
Payer: COMMERCIAL

## 2024-10-09 VITALS — BODY MASS INDEX: 20.34 KG/M2 | WEIGHT: 150 LBS

## 2024-10-09 DIAGNOSIS — K50.00 CROHN'S DISEASE OF SMALL INTESTINE WITHOUT COMPLICATION (H): Primary | ICD-10-CM

## 2024-10-09 RX ORDER — SODIUM CHLORIDE 9 MG/ML
500 INJECTION, SOLUTION INTRAVENOUS PRN
Qty: 999999 ML | Refills: 0 | Status: ACTIVE | OUTPATIENT
Start: 2024-10-23 | End: 2025-04-11

## 2024-10-09 RX ORDER — WATER 10 ML/10ML
70 INJECTION INTRAMUSCULAR; INTRAVENOUS; SUBCUTANEOUS
Qty: 280 ML | Refills: 0 | Status: DISPENSED | OUTPATIENT
Start: 2024-10-23 | End: 2025-04-11

## 2024-10-09 RX ORDER — INFLIXIMAB 100 MG/10ML
700 INJECTION, POWDER, LYOPHILIZED, FOR SOLUTION INTRAVENOUS
Qty: 28 EACH | Refills: 0 | Status: DISPENSED | OUTPATIENT
Start: 2024-10-23 | End: 2025-04-11

## 2024-10-09 RX ORDER — EPINEPHRINE 0.3 MG/.3ML
0.3 INJECTION SUBCUTANEOUS PRN
Qty: 999999 ML | Refills: 0 | Status: ACTIVE | OUTPATIENT
Start: 2024-10-23 | End: 2025-04-11

## 2024-10-09 RX ORDER — DIPHENHYDRAMINE HYDROCHLORIDE 50 MG/ML
50 INJECTION INTRAMUSCULAR; INTRAVENOUS PRN
Qty: 99999 ML | Refills: 0 | Status: ACTIVE | OUTPATIENT
Start: 2024-10-23 | End: 2025-04-11

## 2024-10-28 ENCOUNTER — HOME INFUSION (OUTPATIENT)
Dept: HOME HEALTH SERVICES | Facility: HOME HEALTH | Age: 39
End: 2024-10-28
Payer: COMMERCIAL

## 2024-10-31 ENCOUNTER — TELEPHONE (OUTPATIENT)
Dept: PHARMACY | Facility: CLINIC | Age: 39
End: 2024-10-31
Payer: COMMERCIAL

## 2024-10-31 NOTE — TELEPHONE ENCOUNTER
Pt is due for 3 mo follow up with Alexandria ÁLVAREZ     Call placed to pt to initiate schedulig on 10/31    Outcome: LVM and MyC

## 2024-11-04 ENCOUNTER — HOME INFUSION BILLING (OUTPATIENT)
Dept: HOME HEALTH SERVICES | Facility: HOME HEALTH | Age: 39
End: 2024-11-04
Payer: COMMERCIAL

## 2024-11-04 PROCEDURE — S1015 IV TUBING EXTENSION SET: HCPCS

## 2024-11-04 PROCEDURE — A4215 STERILE NEEDLE: HCPCS

## 2024-11-04 PROCEDURE — A4213 20+ CC SYRINGE ONLY: HCPCS

## 2024-11-04 PROCEDURE — A4930 STERILE, GLOVES PER PAIR: HCPCS

## 2024-11-05 ENCOUNTER — HOME CARE VISIT (OUTPATIENT)
Dept: HOME HEALTH SERVICES | Facility: HOME HEALTH | Age: 39
End: 2024-11-05
Payer: COMMERCIAL

## 2024-11-05 VITALS
OXYGEN SATURATION: 98 % | DIASTOLIC BLOOD PRESSURE: 72 MMHG | HEART RATE: 68 BPM | TEMPERATURE: 97.8 F | RESPIRATION RATE: 16 BRPM | BODY MASS INDEX: 20.34 KG/M2 | WEIGHT: 150 LBS | SYSTOLIC BLOOD PRESSURE: 118 MMHG

## 2024-11-05 PROCEDURE — A4217 STERILE WATER/SALINE, 500 ML: HCPCS

## 2024-11-05 PROCEDURE — S9359 HIT ANTI-TNF PER DIEM: HCPCS

## 2024-11-05 NOTE — PROGRESS NOTES
"Infusion Nursing Note:  Skilled nurse visit today for Remicade for Karlos Terrazas.   present during visit today: Not Applicable.    Pre-infusion Checklist:   Pre-infusion Checklist    Have you had any delayed reaction since last infusion?   No    Have you recently had an elevated temperature, fever, chills productive cough, coughing for 3 weeks or longer or hemoptysis, abnormal vital signs, night sweats, chest pain, or have you noticed a decrease in your appetite, or noted unexplained weight loss or fatigue?   No    Do you have any open wounds or new incisions?  No    Do you have any recent or upcoming hospitalizations, surgeries, or dental procedures?   No    Do you currently have or recently have had any signs of illness or infection or are you on any antibiotics?   No    Have you had any new, sudden , or worsening abdominal pain?   No    Have you or anyone in your household received a live vaccination in the past 4 weeks?   No    Have you recently been diagnosed with any new nervous system diseases or cancer diagnosis? (i.e., Multiple Sclerosis, Guillain [place], sezures, neurological changes) Are you receiving any form of radiation or chemotherapy?   No    Are you pregnant or breastfeeding, or do you have plans of pregnancy in the future?   No    Have you been having any signs of worsening depression or suicidal ideation?  No    Have there been any other new onset medical symptoms?  No    Did the patient answer \"YES\" to any of the questions above?  No    Will the patient receive a medication that has an order for infusion reaction management?  Yes, and all drugs and supplies are available and none have .    If ordered, has the patient taken pre-medications?  No    Plan:   Therapy is appropriate, will proceed with treatment.     Chemotherapy Treatment Conditions:   Not Applicable    Intravenous Access:  Peripheral IV placed.    Post Infusion Assessment:  Patient tolerated infusion without " incident.     Note: Pt is A&O. Deeking well overall. Has had some lower R quadrant and pain.  Pain has improved and is intermittent. Denies current and pain. intermittent over the past week. Reports having one BM daily. Denies loose stools or diarrhea. No other concerns. 30 mL total  NS flushes.    Next Visit Plan:   12/19/24 after surgery. Service  planning completed.       Annetta Garcia RN 11/5/2024

## 2024-11-20 LAB
ABO/RH(D): NORMAL
ANTIBODY SCREEN: NEGATIVE
SPECIMEN EXPIRATION DATE: NORMAL

## 2024-11-21 ENCOUNTER — TEAM CONFERENCE (OUTPATIENT)
Dept: SURGERY | Facility: CLINIC | Age: 39
End: 2024-11-21

## 2024-11-21 ENCOUNTER — ANESTHESIA EVENT (OUTPATIENT)
Dept: SURGERY | Facility: CLINIC | Age: 39
End: 2024-11-21
Payer: COMMERCIAL

## 2024-11-21 ENCOUNTER — LAB (OUTPATIENT)
Dept: LAB | Facility: CLINIC | Age: 39
End: 2024-11-21
Payer: COMMERCIAL

## 2024-11-21 ENCOUNTER — OFFICE VISIT (OUTPATIENT)
Dept: SURGERY | Facility: CLINIC | Age: 39
End: 2024-11-21
Payer: COMMERCIAL

## 2024-11-21 ENCOUNTER — PRE VISIT (OUTPATIENT)
Dept: SURGERY | Facility: CLINIC | Age: 39
End: 2024-11-21

## 2024-11-21 VITALS
HEIGHT: 72 IN | BODY MASS INDEX: 20.86 KG/M2 | RESPIRATION RATE: 16 BRPM | DIASTOLIC BLOOD PRESSURE: 75 MMHG | TEMPERATURE: 99 F | HEART RATE: 83 BPM | WEIGHT: 154 LBS | SYSTOLIC BLOOD PRESSURE: 112 MMHG | OXYGEN SATURATION: 100 %

## 2024-11-21 DIAGNOSIS — K50.00 CROHN'S DISEASE OF SMALL INTESTINE WITHOUT COMPLICATION (H): ICD-10-CM

## 2024-11-21 DIAGNOSIS — Z01.818 PRE-OP EVALUATION: Primary | ICD-10-CM

## 2024-11-21 DIAGNOSIS — Z01.818 PRE-OP EVALUATION: ICD-10-CM

## 2024-11-21 LAB
ALBUMIN SERPL BCG-MCNC: 4.4 G/DL (ref 3.5–5.2)
ALP SERPL-CCNC: 89 U/L (ref 40–150)
ALT SERPL W P-5'-P-CCNC: 13 U/L (ref 0–70)
ANION GAP SERPL CALCULATED.3IONS-SCNC: 7 MMOL/L (ref 7–15)
AST SERPL W P-5'-P-CCNC: 22 U/L (ref 0–45)
BASOPHILS # BLD AUTO: 0.1 10E3/UL (ref 0–0.2)
BASOPHILS NFR BLD AUTO: 1 %
BILIRUB SERPL-MCNC: 0.4 MG/DL
BUN SERPL-MCNC: 12.4 MG/DL (ref 6–20)
CALCIUM SERPL-MCNC: 9.4 MG/DL (ref 8.8–10.4)
CHLORIDE SERPL-SCNC: 106 MMOL/L (ref 98–107)
CREAT SERPL-MCNC: 0.91 MG/DL (ref 0.67–1.17)
EGFRCR SERPLBLD CKD-EPI 2021: >90 ML/MIN/1.73M2
EOSINOPHIL # BLD AUTO: 0.1 10E3/UL (ref 0–0.7)
EOSINOPHIL NFR BLD AUTO: 2 %
ERYTHROCYTE [DISTWIDTH] IN BLOOD BY AUTOMATED COUNT: 12.5 % (ref 10–15)
GLUCOSE SERPL-MCNC: 90 MG/DL (ref 70–99)
HCO3 SERPL-SCNC: 28 MMOL/L (ref 22–29)
HCT VFR BLD AUTO: 40.8 % (ref 40–53)
HGB BLD-MCNC: 14 G/DL (ref 13.3–17.7)
IMM GRANULOCYTES # BLD: 0 10E3/UL
IMM GRANULOCYTES NFR BLD: 0 %
LYMPHOCYTES # BLD AUTO: 1.6 10E3/UL (ref 0.8–5.3)
LYMPHOCYTES NFR BLD AUTO: 31 %
MCH RBC QN AUTO: 30.1 PG (ref 26.5–33)
MCHC RBC AUTO-ENTMCNC: 34.3 G/DL (ref 31.5–36.5)
MCV RBC AUTO: 88 FL (ref 78–100)
MONOCYTES # BLD AUTO: 0.3 10E3/UL (ref 0–1.3)
MONOCYTES NFR BLD AUTO: 7 %
NEUTROPHILS # BLD AUTO: 3 10E3/UL (ref 1.6–8.3)
NEUTROPHILS NFR BLD AUTO: 59 %
NRBC # BLD AUTO: 0 10E3/UL
NRBC BLD AUTO-RTO: 0 /100
PLATELET # BLD AUTO: 277 10E3/UL (ref 150–450)
POTASSIUM SERPL-SCNC: 4.3 MMOL/L (ref 3.4–5.3)
PREALB SERPL-MCNC: 22.3 MG/DL (ref 20–40)
PROT SERPL-MCNC: 7.3 G/DL (ref 6.4–8.3)
RBC # BLD AUTO: 4.65 10E6/UL (ref 4.4–5.9)
SODIUM SERPL-SCNC: 141 MMOL/L (ref 135–145)
WBC # BLD AUTO: 5.1 10E3/UL (ref 4–11)

## 2024-11-21 PROCEDURE — 80053 COMPREHEN METABOLIC PANEL: CPT | Performed by: PATHOLOGY

## 2024-11-21 PROCEDURE — 85025 COMPLETE CBC W/AUTO DIFF WBC: CPT | Performed by: PATHOLOGY

## 2024-11-21 PROCEDURE — 36415 COLL VENOUS BLD VENIPUNCTURE: CPT | Performed by: PATHOLOGY

## 2024-11-21 PROCEDURE — 99000 SPECIMEN HANDLING OFFICE-LAB: CPT | Performed by: PATHOLOGY

## 2024-11-21 PROCEDURE — 84134 ASSAY OF PREALBUMIN: CPT | Performed by: SURGERY

## 2024-11-21 ASSESSMENT — PAIN SCALES - GENERAL: PAINLEVEL_OUTOF10: NO PAIN (0)

## 2024-11-21 NOTE — PROGRESS NOTES
COLON AND RECTAL SURGERY HUDDLE:    Patient was reviewed in preparation for their surgery the following was reviewed and has been completed:    Surgeon: Dr. Talat Osorio    Surgery & Date: 12/5 Laparoscopic ileocecectomy with KONO-S anastomosis     Last MD Note: reviewed    Anesthesia Type: General    Other Providers: No    PAC: Yes    WOC: N/A    Labs: Yes    Bowel Prep: Yes MiraLAX / Gatorade , Antibiotic, and Magnesium Citrate---needs to  still     Packet: Yes    Imaging: N/A    Post-Op Appointments: Yes    Pre op soap: Yes Questions about shower: no    Is patient on TPN?: N/A   If yes, I contacted the TPN pharmacist by paging the  pharmacy at [phone] or calling [phone]. I also contacted Marlyn PIERCE with inpatient colon and rectal team.     Pre op call complete: Yes

## 2024-11-21 NOTE — PATIENT INSTRUCTIONS
Preparing for Your Surgery      Name:  Karlos Terrazas   MRN:  [id number]   :  1985   Today's Date:  2024       Arriving for surgery:  Surgery date:  24  Arrival time:  5.30AM    Please come to:     Please come to:       [facility]    [address] [place] [place]  [address]     The [facility] [place] Patient/Visitor Ramp is at [address]. Patients and visitors who self-park will receive the reduced hospital parking rate. If the Patient /Visitor Ramp is full, please follow the signs to the [employer] car park located at the main hospital entrance.       parking is available (24 hours/ 7 days a week)      Discounted parking pass options are available for patients and visitors. They can be purchased at the youcalc desk at the main hospital entrance.     -    Stop at the security desk and they will direct surgery patients to the Surgery Check in and Family Lounge. [phone]        - If you need directions, a wheelchair or an escort please stop at the Information/security desk in the lobby.     What can I eat or drink?  -  Please follow the bowel prep and diet instructions by Dr. Osorio  -  You may have clear liquids until 2 hours prior to arrival time. (Until 3.30AM)    Examples of clear liquids:  Water  Clear broth  Juices (apple, white grape, white cranberry  and cider) without pulp  Noncarbonated, powder based beverages  (lemonade and Bradley-Aid)  Sodas (Sprite, 7-Up, ginger ale and seltzer)  Coffee or tea (without milk or cream)  Gatorade    -  No Alcohol or cannabis products for at least 24 hours before surgery.     Which medicines can I take?    Hold Aspirin for 7 days before surgery.   Hold Multivitamins for 7 days before surgery.  Hold Supplements for 7 days before surgery.  Hold Ibuprofen (Advil, Motrin) for 1 day(s) before surgery--unless otherwise directed by  surgeon.  Hold Naproxen (Aleve) for 4 days before surgery.    -  DO NOT take these medications the day of surgery:  Calcium + vitamin D  Iron    -  PLEASE TAKE these medications the day of surgery:  None    How do I prepare myself?  - Please take 2 showers (one the night prior to surgery and one the morning of surgery) using Scrubcare or Hibiclens soap.    Use this soap only from the neck to your toes. Avoid genital area      Leave the soap on your skin for one minute--then rinse thoroughly.      You may use your own shampoo and conditioner. No other hair products.   - Please remove all jewelry and body piercings.  - No lotions, deodorants or fragrance.  - No makeup or fingernail polish.   - Bring your ID and insurance card.    -If you use a CPAP machine, please bring the CPAP machine, tubing, and mask to hospital.    -If you have a Deep Brain Stimulator, Spinal Cord Stimulator, or any Neuro Stimulator device---you must bring the remote control to the hospital.      ALL PATIENTS GOING HOME THE SAME DAY OF SURGERY ARE REQUIRED TO HAVE A RESPONSIBLE ADULT TO DRIVE AND BE IN ATTENDANCE WITH THEM FOR 24 HOURS FOLLOWING SURGERY.    Covid testing policy as of 12/06/2022  Your surgeon will notify and schedule you for a COVID test if one is needed before surgery--please direct any questions or COVID symptoms to your surgeon      Questions or Concerns:    - For any questions regarding the day of surgery or your hospital stay, please contact the Pre Admission Nursing Office at [phone].       - If you have health changes between today and your surgery, please call your surgeon.       - For questions after surgery, please call your surgeons office.           Current Visitor Guidelines    You may have 2 visitors in the pre op area.    Visiting hours: 8 a.m. to 8:30 p.m.    Patients confirmed or suspected to have symptoms of COVID 19 or flu:     No visitors allowed for adult patients.   Children (under age 18) can have 1  named visitor.     People who are sick or showing symptoms of COVID 19 or flu:    Are not allowed to visit patients--we can only make exceptions in special situations.       Please follow these guidelines for your visit:          Please maintain social distance          Masking is optional--however at times you may be asked to wear a mask for the safety of yourself and others     Clean your hands with alcohol hand . Do this when you arrive at and leave the building and patient room,    And again after you touch your mask or anything in the room.     Go directly to and from the room you are visiting.     Stay in the patient s room during your visit. Limit going to other places in the hospital as much as possible     Leave bags and jackets at home or in the car.     For everyone s health, please don t come and go during your visit. That includes for smoking   during your visit.

## 2024-11-21 NOTE — H&P
Pre-Operative H & P     CC:  Preoperative exam to assess for increased cardiopulmonary risk while undergoing surgery and anesthesia.    Date of Encounter: 11/21/2024  Primary Care Physician:  Mary Velasquez     Reason for visit:   Encounter Diagnoses   Name Primary?    Pre-op evaluation Yes    Crohn's disease of small intestine without complication (H)        HPI  Karlos Terrazas is a 39 year old male who presents for pre-operative H & P in preparation for  Procedure Information       Case: [id number] Date/Time: 12/05/24 0730    Procedure: Laparoscopic ileocecectomy with KONO-S anastomosis (Abdomen)    Anesthesia type: General    Diagnosis: Crohn's disease of small intestine without complication (H) [K50.00]    Pre-op diagnosis: Crohn's disease of small intestine without complication (H) [K50.00]    Location:  OR  /  OR    Providers: Talat Osorio MD            Patient is being evaluated for comorbid conditions of anemia.    He has a history of Crohn's disease with ileitis (multifocal with stricturing). He is being managed with infliximab every 6 weeks. An MRE completed in May showed a long segment of inflammation of the ileum with associated luminal narrowing. He was evaluated by Dr. Osorio and the above surgery was recommended for management.     History is obtained from the patient and chart review    Hx of abnormal bleeding or anti-platelet use: Denies      Past Medical History  Past Medical History:   Diagnosis Date    Anemia 2/1/2023    Crohn's disease    Crohn's disease of small intestine without complication (H)        Past Surgical History  Past Surgical History:   Procedure Laterality Date    COLONOSCOPY N/A 04/07/2023    Procedure: COLONOSCOPY, WITH BIOPSY;  Surgeon: Lamont Seaman MD;  Location: [facility] OR    LAPAROSCOPIC APPENDECTOMY N/A 02/22/2023    STILL HAS APPENDIX - actually had a diagnostic laparoscopy and Crohn's was diagnosed    LASIK  1/1/2019       Prior to Admission  "Medications  Current Outpatient Medications   Medication Sig Dispense Refill    CALCIUM-VITAMIN D PO Take 1 tablet by mouth daily      FERROUS BISGLYCINATE CHELATE PO Take 1 tablet by mouth three times a week      inFLIXimab (REMICADE) 100 MG injection Add to infusion 70 mLs (700 mg) once every six weeks. Reconstitute infliximab vial(s).  Draw up infliximab 10 mg/mL in syringe with 21 G needle and add to NaCl 0.9% bag immediately prior to infusing.  MIX GENTLY BY INVERSION, DO NOT SHAKE. Discard remainder of vial(s). 28 each 0    metroNIDAZOLE (FLAGYL) 500 MG tablet Take 1 tablet (500 mg) by mouth every 6 hours. At 8:00 am, 2:00 pm, 8:00 pm the day prior to your surgery with neomycin and zofran. 3 tablet 0    sodium chloride 0.9% bag Infuse 250 mLs over 1 hours into the vein once every six weeks. Add 70 mL (700 mg) of infliximab 10 mg/mL to bag immediately prior to infusing via gravity infusion. When complete, flush bag with 20 mL NS to flush tubing. 938824 mL 0    carboxymethylcellulose PF (CARBOXYMETHYLCELLULOSE SODIUM) 0.5 % ophthalmic solution Place 1 drop into both eyes 4 times daily Preservative free artificial tears, single use vials. (Patient not taking: Reported on 11/21/2024) 400 each 11    diphenhydrAMINE (BENADRYL) 50 MG/ML injection Inject 1 mL (50 mg) over 3-5 minutes into the vein via push as needed for other (infusion reaction). For RN use only.  Draw up in a syringe and administer IV push.  Discard remainder of vial. 66814 mL 0    Emergency Supply Kit, PIV, Patient use for emergency only. Contents: 3 sodium chloride 0.9% flushes, 1 IV start kit, 1 microclave ext set 14\", 1 each IV Cath 22 G/1\" and 24G/3/4\", 6 alcohol prep pads, 4 nitrile gloves (med). Call [phone] to reorder. 641534 kit 0    EPINEPHrine (ANY BX GENERIC EQUIV) 0.3 MG/0.3ML injection 2-pack Inject 0.3 mLs (0.3 mg) into the muscle as needed for anaphylaxis (infusion reaction). Administer into the mid-thigh in case of severe " anaphylaxis (wheezing, throat tightening, mouth swelling, difficulty breathing). May repeat dose one time in 5-15 minutes if symptoms persist. 857221 mL 0    magnesium citrate 1.745 GM/30ML solution Drink 1 bottle at 8pm the night before surgery 296 mL 0    methylPREDNISolone Na Suc, PF, (SOLU-MEDROL) 125 mg/2 mL injection Inject 2 mLs (125 mg) over 3-5 minutes into the vein via push as needed (infusion reaction). For RN use only.  Reconstitute vial. Draw up methylPREDNISolone in a syringe and administer.  Discard remainder of vial. 984242 mL 0    neomycin (MYCIFRADIN) 500 MG tablet Take 2 tablets (1,000 mg) by mouth every 6 hours. At 8:00 am, 2:00 pm, 8:00 pm the day prior to your surgery with flagyl and zofran. 6 tablet 0    ondansetron (ZOFRAN) 4 MG tablet Take 1 tablet (4 mg) by mouth every 6 hours. At 8:00 am, 2:00 pm, 8:00 pm the day prior to your surgery with neomycin and flagyl. 3 tablet 0    polyethylene glycol (MIRALAX) 17 GM/Dose powder Please take 238 grams mixed with 64 oz of Gatorade at 4pm the night before surgery 238 g 0    sodium chloride 0.9% infusion Infuse 500 mLs into the vein as needed for other (infusion reaction). In case of mild reaction, administer via gravity at 20 mL/hr to keep vein open. In case of severe reaction, administer via gravity wide open on prime setting. (Patient not taking: Reported on 11/21/2024) 719918 mL 0    sodium chloride, PF, 0.9% PF flush Inject 10 mLs into the vein as needed for other (infusion reaction). For RN use only as needed for infusion reaction (Patient not taking: Reported on 11/21/2024) 263672 mL 0    sodium chloride, PF, 0.9% PF flush Inject 10 mLs into the vein as needed for line flush. Flush IV before and after medication administration as directed and/or at least every 12 hours. 635550 mL 0    sterile water, preservative free, injection Use 70 mLs for reconstitution once every six weeks. 1. Reconstitute each vial of infliximab with 10 mL of sterile  water for injection by slowly injecting 10 mL sterile water down the inside wall of vial w/21 G needle. DO NOT SHAKE. Foaming of the solution on reconstitution is not unusual. Roll and tilt each vial gently.  2. Let drug stand for 5 minutes. 3. Inspect vials for particules and/or discoloration prior to continuing. The solution should be colorless to light yellow and opalescent, and may develop a few translucent particles. DO NOT USE IF DRUG HAS NOT FULLY DISSOLVED OR IF OPAQUE PARTICLES, DISCOLORATION OR OTHER FOREIGN PARTICULES ARE PRESENT. 4. Draw up appropriate dose w/ 21 G needle from vial. 280 mL 0       Allergies  Allergies   Allergen Reactions    Other Food Allergy Angioedema     carrots       Social History  Social History     Socioeconomic History    Marital status:      Spouse name: Not on file    Number of children: Not on file    Years of education: Not on file    Highest education level: Not on file   Occupational History    Not on file   Tobacco Use    Smoking status: Never    Smokeless tobacco: Never   Vaping Use    Vaping status: Never Used   Substance and Sexual Activity    Alcohol use: Not Currently     Comment: 0 drinks per week    Drug use: Not Currently     Types: Marijuana     Comment: Occasional 2x month    Sexual activity: Yes     Partners: Female     Birth control/protection: Condom   Other Topics Concern    Parent/sibling w/ CABG, MI or angioplasty before 65F 55M? No   Social History Narrative    Not on file     Social Drivers of Health     Financial Resource Strain: Low Risk  (1/26/2024)    Financial Resource Strain     Within the past 12 months, have you or your family members you live with been unable to get utilities (heat, electricity) when it was really needed?: No   Food Insecurity: Low Risk  (1/26/2024)    Food Insecurity     Within the past 12 months, did you worry that your food would run out before you got money to buy more?: No     Within the past 12 months, did the food  you bought just not last and you didn t have money to get more?: No   Transportation Needs: Low Risk  (1/26/2024)    Transportation Needs     Within the past 12 months, has lack of transportation kept you from medical appointments, getting your medicines, non-medical meetings or appointments, work, or from getting things that you need?: No   Physical Activity: Not on file   Stress: Not on file   Social Connections: Not on file   Interpersonal Safety: Not on file   Housing Stability: Low Risk  (1/26/2024)    Housing Stability     Do you have housing? : Yes     Are you worried about losing your housing?: No       Family History  Family History   Problem Relation Age of Onset    Melanoma Mother     Skin Cancer Mother     Hyperlipidemia Mother     Thyroid Disease Mother     Anesthesia Reaction Mother         PONV    Skin Cancer Father         SCC and BCC    Skin Cancer Maternal Grandmother     Deep Vein Thrombosis (DVT) Maternal Grandmother     Melanoma Paternal Grandmother     Colon Cancer No family hx of        Review of Systems  The complete review of systems is negative other than noted in the HPI or here.   Anesthesia Evaluation   Pt has had prior anesthetic.     History of anesthetic complications  -  and PONV.  woke crying.    ROS/MED HX  ENT/Pulmonary:  - neg pulmonary ROS     Neurologic:  - neg neurologic ROS     Cardiovascular: Comment: Denies chest pain/pressure, PIERSON, orthopnea, dizziness, palpitations, edema.  - neg cardiovascular ROS     METS/Exercise Tolerance: >4 METS    Hematologic:     (+)      anemia,       (-) history of blood clots and history of blood transfusion   Musculoskeletal:  - neg musculoskeletal ROS     GI/Hepatic:     (+)       Inflammatory bowel disease, bowel prep,         (-) GERD and liver disease   Renal/Genitourinary:  - neg Renal ROS     Endo:  - neg endo ROS     Psychiatric/Substance Use:  - neg psychiatric ROS     Infectious Disease:  - neg infectious disease ROS     Malignancy:   - neg malignancy ROS     Other:  - neg other ROS          /75 (BP Location: Right arm, Patient Position: Sitting, Cuff Size: Adult Large)   Pulse 83   Temp 99  F (37.2  C) (Oral)   Resp 16   Ht 1.829 m (6')   Wt 69.9 kg (154 lb)   SpO2 100%   BMI 20.89 kg/m      Physical Exam   Constitutional: Pleasant, well-appearing, no apparent distress, and appears stated age.  Eyes: Pupils equal, round and reactive to light, extra ocular muscles intact, sclera clear, conjunctiva normal.  HENT: Normocephalic and atraumatic, oral pharynx with moist mucus membranes, good dentition. No goiter appreciated.   Respiratory: Clear to auscultation bilaterally, no crackles or wheezing.  Cardiovascular: Regular rate and rhythm, normal S1 and S2, and no murmur noted. No edema. Palpable pulses to DP and PT arteries.   GI: Normal bowel sounds, soft, non-distended, non-tender, no masses palpated, no hepatosplenomegaly.  Lymph/Hematologic: No cervical lymphadenopathy and no supraclavicular lymphadenopathy.  Genitourinary:  Deferred  Skin: Warm and dry.  No rashes on exposed skin   Musculoskeletal: Full ROM of neck. There is no redness, warmth, or swelling of the visible joints. Gross motor strength is normal.    Neurologic: Awake, alert, oriented to name, place and time. Cranial nerves II-XII are grossly intact. Gait is normal.   Neuropsychiatric: Calm, cooperative. Normal affect.       Prior Labs/Diagnostic Studies   All labs and imaging personally reviewed     EKG/ stress test - if available please see in ROS above   No results found.    The patient's records and results personally reviewed by this provider.     Outside records reviewed from: Care Everywhere    LAB/DIAGNOSTIC STUDIES TODAY:  T&S, and surgeon labs    Assessment  Karlos Terrazas is a 39 year old male seen as a PAC referral for risk assessment and optimization for anesthesia.    Plan/Recommendations  Pt will be optimized for the proposed procedure.  See below for  details on the assessment, risk, and preoperative recommendations    NEUROLOGY  - No history of TIA, CVA or seizure    -Post Op delirium risk factors:  No risk identified    ENT  - No current airway concerns.  Will need to be reassessed day of surgery.  Mallampati: I  TM: > 3    CARDIAC  - No history of CAD, Hypertension, and Afib  - METS (Metabolic Equivalents)  Patient performs 4 or more METS exercise without symptoms             Total Score: 0      RCRI-Low risk: Class 2 0.9% complication rate             Total Score: 1    RCRI: High Risk Surgery        PULMONARY    LEANNE Low Risk             Total Score: 1    LEANNE: Male      - Denies asthma or inhaler use  - Tobacco History    History   Smoking Status    Never   Smokeless Tobacco    Never       GI  - Crohn's Disease  Above surgery planned for further management  Last Remicade infusion 11/5 - patient states no further doses are planned before surgery. He is working with a pharmacist for management.    PONV Medium Risk  Total Score: 2           1 AN PONV: Patient is not a current smoker    1 AN PONV: Intended Post Op Opioids        /RENAL  - Baseline Creatinine  0.90    ENDOCRINE    - BMI: Estimated body mass index is 20.89 kg/m  as calculated from the following:    Height as of this encounter: 1.829 m (6').    Weight as of this encounter: 69.9 kg (154 lb).  Healthy Weight (BMI 18.5-24.9)  - No history of Diabetes Mellitus    HEME  VTE Medium Risk 1.8%             Total Score: 6    VTE: Male    VTE: Family Hx of VTE      - No history of abnormal bleeding or antiplatelet use.  - History of anemia  CBC 9/24/24 WNL  Recommend perioperative use of blood conservation techniques intraoperatively and close monitoring for postoperative bleeding.  A type and screen has been ordered for this patient    MSK  Patient is NOT Frail             Total Score: 0        Different anesthesia methods/types have been discussed with the patient, but they are aware that the final plan  will be decided by the assigned anesthesia provider on the date of service.      The patient is optimized for their procedure. AVS with information on surgery time/arrival time, meds and NPO status given by nursing staff. No further diagnostic testing indicated.      On the day of service:     Prep time: 15 minutes  Visit time: 15 minutes  Documentation time: 10 minutes  ------------------------------------------  Total time: 40 minutes      Joy Lamas PA-C  Preoperative Assessment Center  [facility]  [facility] Center  Phone: [phone]  Fax: [phone]

## 2024-12-05 ENCOUNTER — ANESTHESIA (OUTPATIENT)
Dept: SURGERY | Facility: CLINIC | Age: 39
End: 2024-12-05
Payer: COMMERCIAL

## 2024-12-05 ENCOUNTER — HOSPITAL ENCOUNTER (INPATIENT)
Facility: CLINIC | Age: 39
LOS: 4 days | Discharge: HOME | End: 2024-12-09
Attending: SURGERY | Admitting: SURGERY
Payer: COMMERCIAL

## 2024-12-05 DIAGNOSIS — K50.919 CROHN'S DISEASE WITH COMPLICATION, UNSPECIFIED GASTROINTESTINAL TRACT LOCATION (H): ICD-10-CM

## 2024-12-05 DIAGNOSIS — R11.0 POSTOPERATIVE NAUSEA: ICD-10-CM

## 2024-12-05 DIAGNOSIS — G89.18 ACUTE POST-OPERATIVE PAIN: Primary | ICD-10-CM

## 2024-12-05 DIAGNOSIS — Z98.890 POSTOPERATIVE NAUSEA: ICD-10-CM

## 2024-12-05 DIAGNOSIS — K50.00 CROHN'S DISEASE OF SMALL INTESTINE WITHOUT COMPLICATION (H): ICD-10-CM

## 2024-12-05 PROBLEM — K50.90 CROHN DISEASE (H): Status: ACTIVE | Noted: 2024-12-05

## 2024-12-05 LAB
ABO/RH(D): NORMAL
ANTIBODY SCREEN: NEGATIVE
GLUCOSE BLDC GLUCOMTR-MCNC: 98 MG/DL (ref 70–99)
PLATELET # BLD AUTO: 356 10E3/UL (ref 150–450)
SPECIMEN EXPIRATION DATE: NORMAL

## 2024-12-05 PROCEDURE — 250N000011 HC RX IP 250 OP 636: Performed by: ANESTHESIOLOGY

## 2024-12-05 PROCEDURE — 250N000011 HC RX IP 250 OP 636: Performed by: SURGERY

## 2024-12-05 PROCEDURE — 258N000003 HC RX IP 258 OP 636: Performed by: NURSE ANESTHETIST, CERTIFIED REGISTERED

## 2024-12-05 PROCEDURE — 250N000009 HC RX 250: Performed by: ANESTHESIOLOGY

## 2024-12-05 PROCEDURE — 360N000076 HC SURGERY LEVEL 3, PER MIN: Performed by: SURGERY

## 2024-12-05 PROCEDURE — 258N000003 HC RX IP 258 OP 636: Performed by: ANESTHESIOLOGY

## 2024-12-05 PROCEDURE — 88307 TISSUE EXAM BY PATHOLOGIST: CPT | Mod: TC | Performed by: SURGERY

## 2024-12-05 PROCEDURE — 250N000011 HC RX IP 250 OP 636

## 2024-12-05 PROCEDURE — 250N000011 HC RX IP 250 OP 636: Performed by: NURSE ANESTHETIST, CERTIFIED REGISTERED

## 2024-12-05 PROCEDURE — 370N000017 HC ANESTHESIA TECHNICAL FEE, PER MIN: Performed by: SURGERY

## 2024-12-05 PROCEDURE — 258N000003 HC RX IP 258 OP 636: Performed by: SURGERY

## 2024-12-05 PROCEDURE — 85049 AUTOMATED PLATELET COUNT: CPT | Performed by: SURGERY

## 2024-12-05 PROCEDURE — 710N000009 HC RECOVERY PHASE 1, LEVEL 1, PER MIN: Performed by: SURGERY

## 2024-12-05 PROCEDURE — 120N000002 HC R&B MED SURG/OB UMMC

## 2024-12-05 PROCEDURE — 250N000013 HC RX MED GY IP 250 OP 250 PS 637: Performed by: ANESTHESIOLOGY

## 2024-12-05 PROCEDURE — 0DTF4ZZ RESECTION OF RIGHT LARGE INTESTINE, PERCUTANEOUS ENDOSCOPIC APPROACH: ICD-10-PCS | Performed by: SURGERY

## 2024-12-05 PROCEDURE — 86900 BLOOD TYPING SEROLOGIC ABO: CPT | Performed by: ANESTHESIOLOGY

## 2024-12-05 PROCEDURE — 36415 COLL VENOUS BLD VENIPUNCTURE: CPT | Performed by: SURGERY

## 2024-12-05 PROCEDURE — 250N000009 HC RX 250: Performed by: NURSE ANESTHETIST, CERTIFIED REGISTERED

## 2024-12-05 PROCEDURE — 272N000001 HC OR GENERAL SUPPLY STERILE: Performed by: SURGERY

## 2024-12-05 PROCEDURE — 999N000141 HC STATISTIC PRE-PROCEDURE NURSING ASSESSMENT: Performed by: SURGERY

## 2024-12-05 PROCEDURE — 250N000013 HC RX MED GY IP 250 OP 250 PS 637: Performed by: SURGERY

## 2024-12-05 PROCEDURE — C9290 INJ, BUPIVACAINE LIPOSOME: HCPCS

## 2024-12-05 PROCEDURE — 88307 TISSUE EXAM BY PATHOLOGIST: CPT | Mod: 26 | Performed by: PATHOLOGY

## 2024-12-05 PROCEDURE — 250N000025 HC SEVOFLURANE, PER MIN: Performed by: SURGERY

## 2024-12-05 RX ORDER — LIDOCAINE 40 MG/G
CREAM TOPICAL
Status: DISCONTINUED | OUTPATIENT
Start: 2024-12-05 | End: 2024-12-09 | Stop reason: HOSPADM

## 2024-12-05 RX ORDER — NALOXONE HYDROCHLORIDE 0.4 MG/ML
0.4 INJECTION, SOLUTION INTRAMUSCULAR; INTRAVENOUS; SUBCUTANEOUS
Status: DISCONTINUED | OUTPATIENT
Start: 2024-12-05 | End: 2024-12-09 | Stop reason: HOSPADM

## 2024-12-05 RX ORDER — SODIUM CHLORIDE, SODIUM LACTATE, POTASSIUM CHLORIDE, CALCIUM CHLORIDE 600; 310; 30; 20 MG/100ML; MG/100ML; MG/100ML; MG/100ML
INJECTION, SOLUTION INTRAVENOUS CONTINUOUS
Status: DISCONTINUED | OUTPATIENT
Start: 2024-12-05 | End: 2024-12-06

## 2024-12-05 RX ORDER — ONDANSETRON 4 MG/1
4 TABLET, ORALLY DISINTEGRATING ORAL EVERY 30 MIN PRN
Status: DISCONTINUED | OUTPATIENT
Start: 2024-12-05 | End: 2024-12-05 | Stop reason: HOSPADM

## 2024-12-05 RX ORDER — DEXAMETHASONE SODIUM PHOSPHATE 4 MG/ML
INJECTION, SOLUTION INTRA-ARTICULAR; INTRALESIONAL; INTRAMUSCULAR; INTRAVENOUS; SOFT TISSUE PRN
Status: DISCONTINUED | OUTPATIENT
Start: 2024-12-05 | End: 2024-12-05

## 2024-12-05 RX ORDER — SODIUM CHLORIDE, SODIUM LACTATE, POTASSIUM CHLORIDE, CALCIUM CHLORIDE 600; 310; 30; 20 MG/100ML; MG/100ML; MG/100ML; MG/100ML
INJECTION, SOLUTION INTRAVENOUS CONTINUOUS
Status: DISCONTINUED | OUTPATIENT
Start: 2024-12-05 | End: 2024-12-05 | Stop reason: HOSPADM

## 2024-12-05 RX ORDER — HYDROMORPHONE HCL IN WATER/PF 6 MG/30 ML
0.2 PATIENT CONTROLLED ANALGESIA SYRINGE INTRAVENOUS EVERY 5 MIN PRN
Status: DISCONTINUED | OUTPATIENT
Start: 2024-12-05 | End: 2024-12-05 | Stop reason: HOSPADM

## 2024-12-05 RX ORDER — PROPOFOL 10 MG/ML
INJECTION, EMULSION INTRAVENOUS CONTINUOUS PRN
Status: DISCONTINUED | OUTPATIENT
Start: 2024-12-05 | End: 2024-12-05

## 2024-12-05 RX ORDER — ACETAMINOPHEN 325 MG/1
975 TABLET ORAL ONCE
Status: COMPLETED | OUTPATIENT
Start: 2024-12-05 | End: 2024-12-05

## 2024-12-05 RX ORDER — FENTANYL CITRATE 50 UG/ML
25-50 INJECTION, SOLUTION INTRAMUSCULAR; INTRAVENOUS
Status: DISCONTINUED | OUTPATIENT
Start: 2024-12-05 | End: 2024-12-05 | Stop reason: HOSPADM

## 2024-12-05 RX ORDER — LABETALOL HYDROCHLORIDE 5 MG/ML
10 INJECTION, SOLUTION INTRAVENOUS
Status: DISCONTINUED | OUTPATIENT
Start: 2024-12-05 | End: 2024-12-05 | Stop reason: HOSPADM

## 2024-12-05 RX ORDER — MEPERIDINE HYDROCHLORIDE 25 MG/ML
12.5 INJECTION INTRAMUSCULAR; INTRAVENOUS; SUBCUTANEOUS EVERY 5 MIN PRN
Status: DISCONTINUED | OUTPATIENT
Start: 2024-12-05 | End: 2024-12-05 | Stop reason: HOSPADM

## 2024-12-05 RX ORDER — NALOXONE HYDROCHLORIDE 0.4 MG/ML
0.2 INJECTION, SOLUTION INTRAMUSCULAR; INTRAVENOUS; SUBCUTANEOUS
Status: DISCONTINUED | OUTPATIENT
Start: 2024-12-05 | End: 2024-12-05 | Stop reason: HOSPADM

## 2024-12-05 RX ORDER — FENTANYL CITRATE 50 UG/ML
INJECTION, SOLUTION INTRAMUSCULAR; INTRAVENOUS PRN
Status: DISCONTINUED | OUTPATIENT
Start: 2024-12-05 | End: 2024-12-05

## 2024-12-05 RX ORDER — SODIUM CHLORIDE, SODIUM LACTATE, POTASSIUM CHLORIDE, CALCIUM CHLORIDE 600; 310; 30; 20 MG/100ML; MG/100ML; MG/100ML; MG/100ML
INJECTION, SOLUTION INTRAVENOUS CONTINUOUS PRN
Status: DISCONTINUED | OUTPATIENT
Start: 2024-12-05 | End: 2024-12-05

## 2024-12-05 RX ORDER — CEFAZOLIN SODIUM/WATER 2 G/20 ML
2 SYRINGE (ML) INTRAVENOUS
Status: COMPLETED | OUTPATIENT
Start: 2024-12-05 | End: 2024-12-05

## 2024-12-05 RX ORDER — HEPARIN SODIUM 5000 [USP'U]/.5ML
5000 INJECTION, SOLUTION INTRAVENOUS; SUBCUTANEOUS
Status: COMPLETED | OUTPATIENT
Start: 2024-12-05 | End: 2024-12-05

## 2024-12-05 RX ORDER — ONDANSETRON 2 MG/ML
4 INJECTION INTRAMUSCULAR; INTRAVENOUS EVERY 6 HOURS PRN
Status: DISCONTINUED | OUTPATIENT
Start: 2024-12-05 | End: 2024-12-09 | Stop reason: HOSPADM

## 2024-12-05 RX ORDER — FENTANYL CITRATE 50 UG/ML
25 INJECTION, SOLUTION INTRAMUSCULAR; INTRAVENOUS EVERY 5 MIN PRN
Status: DISCONTINUED | OUTPATIENT
Start: 2024-12-05 | End: 2024-12-05 | Stop reason: HOSPADM

## 2024-12-05 RX ORDER — NALOXONE HYDROCHLORIDE 0.4 MG/ML
0.2 INJECTION, SOLUTION INTRAMUSCULAR; INTRAVENOUS; SUBCUTANEOUS
Status: DISCONTINUED | OUTPATIENT
Start: 2024-12-05 | End: 2024-12-09 | Stop reason: HOSPADM

## 2024-12-05 RX ORDER — HYDROMORPHONE HCL IN WATER/PF 6 MG/30 ML
0.2 PATIENT CONTROLLED ANALGESIA SYRINGE INTRAVENOUS
Status: DISCONTINUED | OUTPATIENT
Start: 2024-12-05 | End: 2024-12-09 | Stop reason: HOSPADM

## 2024-12-05 RX ORDER — OXYCODONE HYDROCHLORIDE 10 MG/1
10 TABLET ORAL EVERY 4 HOURS PRN
Status: DISCONTINUED | OUTPATIENT
Start: 2024-12-05 | End: 2024-12-09 | Stop reason: HOSPADM

## 2024-12-05 RX ORDER — ACETAMINOPHEN 325 MG/1
975 TABLET ORAL ONCE
Status: DISCONTINUED | OUTPATIENT
Start: 2024-12-05 | End: 2024-12-05 | Stop reason: HOSPADM

## 2024-12-05 RX ORDER — ACETAMINOPHEN 500 MG
1000 TABLET ORAL EVERY 6 HOURS
Status: DISCONTINUED | OUTPATIENT
Start: 2024-12-05 | End: 2024-12-09 | Stop reason: HOSPADM

## 2024-12-05 RX ORDER — FLUMAZENIL 0.1 MG/ML
0.2 INJECTION, SOLUTION INTRAVENOUS
Status: DISCONTINUED | OUTPATIENT
Start: 2024-12-05 | End: 2024-12-05 | Stop reason: HOSPADM

## 2024-12-05 RX ORDER — ONDANSETRON 2 MG/ML
4 INJECTION INTRAMUSCULAR; INTRAVENOUS ONCE
Status: DISCONTINUED | OUTPATIENT
Start: 2024-12-05 | End: 2024-12-05 | Stop reason: HOSPADM

## 2024-12-05 RX ORDER — NALOXONE HYDROCHLORIDE 0.4 MG/ML
0.1 INJECTION, SOLUTION INTRAMUSCULAR; INTRAVENOUS; SUBCUTANEOUS
Status: DISCONTINUED | OUTPATIENT
Start: 2024-12-05 | End: 2024-12-05 | Stop reason: HOSPADM

## 2024-12-05 RX ORDER — HEPARIN SODIUM 5000 [USP'U]/.5ML
5000 INJECTION, SOLUTION INTRAVENOUS; SUBCUTANEOUS EVERY 8 HOURS
Status: DISCONTINUED | OUTPATIENT
Start: 2024-12-06 | End: 2024-12-06

## 2024-12-05 RX ORDER — OXYCODONE HYDROCHLORIDE 5 MG/1
5 TABLET ORAL EVERY 4 HOURS PRN
Status: DISCONTINUED | OUTPATIENT
Start: 2024-12-05 | End: 2024-12-09 | Stop reason: HOSPADM

## 2024-12-05 RX ORDER — HYDROMORPHONE HCL IN WATER/PF 6 MG/30 ML
0.4 PATIENT CONTROLLED ANALGESIA SYRINGE INTRAVENOUS EVERY 5 MIN PRN
Status: DISCONTINUED | OUTPATIENT
Start: 2024-12-05 | End: 2024-12-05 | Stop reason: HOSPADM

## 2024-12-05 RX ORDER — HYDROMORPHONE HCL IN WATER/PF 6 MG/30 ML
0.4 PATIENT CONTROLLED ANALGESIA SYRINGE INTRAVENOUS
Status: DISCONTINUED | OUTPATIENT
Start: 2024-12-05 | End: 2024-12-09 | Stop reason: HOSPADM

## 2024-12-05 RX ORDER — CEFAZOLIN SODIUM/WATER 2 G/20 ML
2 SYRINGE (ML) INTRAVENOUS SEE ADMIN INSTRUCTIONS
Status: DISCONTINUED | OUTPATIENT
Start: 2024-12-05 | End: 2024-12-05 | Stop reason: HOSPADM

## 2024-12-05 RX ORDER — ONDANSETRON 4 MG/1
4 TABLET, ORALLY DISINTEGRATING ORAL EVERY 6 HOURS PRN
Status: DISCONTINUED | OUTPATIENT
Start: 2024-12-05 | End: 2024-12-09 | Stop reason: HOSPADM

## 2024-12-05 RX ORDER — ONDANSETRON 2 MG/ML
4 INJECTION INTRAMUSCULAR; INTRAVENOUS EVERY 30 MIN PRN
Status: DISCONTINUED | OUTPATIENT
Start: 2024-12-05 | End: 2024-12-05 | Stop reason: HOSPADM

## 2024-12-05 RX ORDER — METRONIDAZOLE 500 MG/100ML
500 INJECTION, SOLUTION INTRAVENOUS
Status: DISCONTINUED | OUTPATIENT
Start: 2024-12-05 | End: 2024-12-05 | Stop reason: HOSPADM

## 2024-12-05 RX ORDER — SCOLOPAMINE TRANSDERMAL SYSTEM 1 MG/1
1 PATCH, EXTENDED RELEASE TRANSDERMAL
Status: COMPLETED | OUTPATIENT
Start: 2024-12-05 | End: 2024-12-08

## 2024-12-05 RX ORDER — NALOXONE HYDROCHLORIDE 0.4 MG/ML
0.4 INJECTION, SOLUTION INTRAMUSCULAR; INTRAVENOUS; SUBCUTANEOUS
Status: DISCONTINUED | OUTPATIENT
Start: 2024-12-05 | End: 2024-12-05 | Stop reason: HOSPADM

## 2024-12-05 RX ORDER — PROPOFOL 10 MG/ML
INJECTION, EMULSION INTRAVENOUS PRN
Status: DISCONTINUED | OUTPATIENT
Start: 2024-12-05 | End: 2024-12-05

## 2024-12-05 RX ORDER — FENTANYL CITRATE 50 UG/ML
50 INJECTION, SOLUTION INTRAMUSCULAR; INTRAVENOUS EVERY 5 MIN PRN
Status: DISCONTINUED | OUTPATIENT
Start: 2024-12-05 | End: 2024-12-05 | Stop reason: HOSPADM

## 2024-12-05 RX ORDER — DEXAMETHASONE SODIUM PHOSPHATE 4 MG/ML
4 INJECTION, SOLUTION INTRA-ARTICULAR; INTRALESIONAL; INTRAMUSCULAR; INTRAVENOUS; SOFT TISSUE
Status: DISCONTINUED | OUTPATIENT
Start: 2024-12-05 | End: 2024-12-05 | Stop reason: HOSPADM

## 2024-12-05 RX ORDER — BUPIVACAINE HYDROCHLORIDE 2.5 MG/ML
INJECTION, SOLUTION EPIDURAL; INFILTRATION; INTRACAUDAL
Status: COMPLETED | OUTPATIENT
Start: 2024-12-05 | End: 2024-12-05

## 2024-12-05 RX ORDER — ONDANSETRON 2 MG/ML
INJECTION INTRAMUSCULAR; INTRAVENOUS PRN
Status: DISCONTINUED | OUTPATIENT
Start: 2024-12-05 | End: 2024-12-05

## 2024-12-05 RX ORDER — HYDRALAZINE HYDROCHLORIDE 20 MG/ML
2.5-5 INJECTION INTRAMUSCULAR; INTRAVENOUS EVERY 10 MIN PRN
Status: DISCONTINUED | OUTPATIENT
Start: 2024-12-05 | End: 2024-12-05 | Stop reason: HOSPADM

## 2024-12-05 RX ORDER — LIDOCAINE 40 MG/G
CREAM TOPICAL
Status: DISCONTINUED | OUTPATIENT
Start: 2024-12-05 | End: 2024-12-05 | Stop reason: HOSPADM

## 2024-12-05 RX ADMIN — BUPIVACAINE 20 ML: 13.3 INJECTION, SUSPENSION, LIPOSOMAL INFILTRATION at 06:50

## 2024-12-05 RX ADMIN — HYDROMORPHONE HYDROCHLORIDE 0.2 MG: 0.2 INJECTION, SOLUTION INTRAMUSCULAR; INTRAVENOUS; SUBCUTANEOUS at 12:27

## 2024-12-05 RX ADMIN — Medication 30 MG: at 08:54

## 2024-12-05 RX ADMIN — OXYCODONE HYDROCHLORIDE 5 MG: 5 TABLET ORAL at 16:50

## 2024-12-05 RX ADMIN — FENTANYL CITRATE 50 MCG: 50 INJECTION, SOLUTION INTRAMUSCULAR; INTRAVENOUS at 11:40

## 2024-12-05 RX ADMIN — SCOPOLAMINE 1 PATCH: 1.5 PATCH, EXTENDED RELEASE TRANSDERMAL at 07:12

## 2024-12-05 RX ADMIN — BUPIVACAINE HYDROCHLORIDE 20 ML: 2.5 INJECTION, SOLUTION EPIDURAL; INFILTRATION; INTRACAUDAL; PERINEURAL at 06:52

## 2024-12-05 RX ADMIN — ACETAMINOPHEN 975 MG: 325 TABLET, FILM COATED ORAL at 06:30

## 2024-12-05 RX ADMIN — Medication 10 MG: at 09:37

## 2024-12-05 RX ADMIN — DEXAMETHASONE SODIUM PHOSPHATE 4 MG: 4 INJECTION, SOLUTION INTRA-ARTICULAR; INTRALESIONAL; INTRAMUSCULAR; INTRAVENOUS; SOFT TISSUE at 07:48

## 2024-12-05 RX ADMIN — ONDANSETRON 4 MG: 2 INJECTION, SOLUTION INTRAMUSCULAR; INTRAVENOUS at 11:14

## 2024-12-05 RX ADMIN — PHENYLEPHRINE HYDROCHLORIDE 100 MCG: 10 INJECTION INTRAVENOUS at 09:49

## 2024-12-05 RX ADMIN — PHENYLEPHRINE HYDROCHLORIDE 100 MCG: 10 INJECTION INTRAVENOUS at 08:28

## 2024-12-05 RX ADMIN — SODIUM CHLORIDE, POTASSIUM CHLORIDE, SODIUM LACTATE AND CALCIUM CHLORIDE: 600; 310; 30; 20 INJECTION, SOLUTION INTRAVENOUS at 11:52

## 2024-12-05 RX ADMIN — PHENYLEPHRINE HYDROCHLORIDE 200 MCG: 10 INJECTION INTRAVENOUS at 08:24

## 2024-12-05 RX ADMIN — FENTANYL CITRATE 50 MCG: 50 INJECTION, SOLUTION INTRAMUSCULAR; INTRAVENOUS at 06:50

## 2024-12-05 RX ADMIN — MIDAZOLAM 1 MG: 1 INJECTION INTRAMUSCULAR; INTRAVENOUS at 06:50

## 2024-12-05 RX ADMIN — ACETAMINOPHEN 1000 MG: 500 TABLET ORAL at 14:29

## 2024-12-05 RX ADMIN — FENTANYL CITRATE 100 MCG: 50 INJECTION INTRAMUSCULAR; INTRAVENOUS at 07:48

## 2024-12-05 RX ADMIN — Medication 70 MG: at 07:50

## 2024-12-05 RX ADMIN — OXYCODONE HYDROCHLORIDE 5 MG: 5 TABLET ORAL at 21:53

## 2024-12-05 RX ADMIN — Medication 200 MG: at 10:31

## 2024-12-05 RX ADMIN — PHENYLEPHRINE HYDROCHLORIDE 100 MCG: 10 INJECTION INTRAVENOUS at 08:21

## 2024-12-05 RX ADMIN — HYDROMORPHONE HYDROCHLORIDE 0.5 MG: 1 INJECTION, SOLUTION INTRAMUSCULAR; INTRAVENOUS; SUBCUTANEOUS at 10:17

## 2024-12-05 RX ADMIN — HYDROMORPHONE HYDROCHLORIDE 0.4 MG: 0.2 INJECTION, SOLUTION INTRAMUSCULAR; INTRAVENOUS; SUBCUTANEOUS at 12:38

## 2024-12-05 RX ADMIN — HEPARIN SODIUM 5000 UNITS: 5000 INJECTION, SOLUTION INTRAVENOUS; SUBCUTANEOUS at 06:34

## 2024-12-05 RX ADMIN — PROPOFOL 30 MCG/KG/MIN: 10 INJECTION, EMULSION INTRAVENOUS at 08:12

## 2024-12-05 RX ADMIN — PROPOFOL 170 MG: 10 INJECTION, EMULSION INTRAVENOUS at 07:49

## 2024-12-05 RX ADMIN — ACETAMINOPHEN 1000 MG: 500 TABLET ORAL at 19:59

## 2024-12-05 RX ADMIN — Medication 10 MG: at 09:49

## 2024-12-05 RX ADMIN — SODIUM CHLORIDE, POTASSIUM CHLORIDE, SODIUM LACTATE AND CALCIUM CHLORIDE: 600; 310; 30; 20 INJECTION, SOLUTION INTRAVENOUS at 14:29

## 2024-12-05 RX ADMIN — Medication 2 G: at 07:50

## 2024-12-05 RX ADMIN — PHENYLEPHRINE HYDROCHLORIDE 100 MCG: 10 INJECTION INTRAVENOUS at 09:02

## 2024-12-05 RX ADMIN — PROCHLORPERAZINE EDISYLATE 5 MG: 5 INJECTION INTRAMUSCULAR; INTRAVENOUS at 11:37

## 2024-12-05 RX ADMIN — PHENYLEPHRINE HYDROCHLORIDE 200 MCG: 10 INJECTION INTRAVENOUS at 10:16

## 2024-12-05 RX ADMIN — ONDANSETRON 4 MG: 2 INJECTION INTRAMUSCULAR; INTRAVENOUS at 10:15

## 2024-12-05 RX ADMIN — SODIUM CHLORIDE, POTASSIUM CHLORIDE, SODIUM LACTATE AND CALCIUM CHLORIDE: 600; 310; 30; 20 INJECTION, SOLUTION INTRAVENOUS at 07:32

## 2024-12-05 ASSESSMENT — ACTIVITIES OF DAILY LIVING (ADL)
ADLS_ACUITY_SCORE: 41
ADLS_ACUITY_SCORE: 42
ADLS_ACUITY_SCORE: 30
ADLS_ACUITY_SCORE: 30
ADLS_ACUITY_SCORE: 42
ADLS_ACUITY_SCORE: 28
ADLS_ACUITY_SCORE: 42
ADLS_ACUITY_SCORE: 45
ADLS_ACUITY_SCORE: 42
ADLS_ACUITY_SCORE: 42
ADLS_ACUITY_SCORE: 30
ADLS_ACUITY_SCORE: 42
ADLS_ACUITY_SCORE: 28
ADLS_ACUITY_SCORE: 42
ADLS_ACUITY_SCORE: 28
ADLS_ACUITY_SCORE: 30

## 2024-12-05 NOTE — ANESTHESIA PROCEDURE NOTES
Airway       Patient location during procedure: OR       Procedure Start/Stop Times: 12/5/2024 7:52 AM  Staff -        CRNA: Mamadou Layne APRN CRNA       Performed By: CRNA  Consent for Airway        Urgency: elective  Indications and Patient Condition       Indications for airway management: jaciel-procedural       Induction type:intravenous       Mask difficulty assessment: 1 - vent by mask    Final Airway Details       Final airway type: endotracheal airway       Successful airway: ETT - single  Endotracheal Airway Details        ETT size (mm): 7.5       Cuffed: yes       Successful intubation technique: direct laryngoscopy       DL Blade Type: Terrazas 2       Grade View of Cords: 1       Adjucts: stylet       Position: Right       Measured from: lips       Secured at (cm): 23       Bite block used: None    Post intubation assessment        Placement verified by: capnometry, equal breath sounds and chest rise        Number of attempts at approach: 1       Number of other approaches attempted: 0       Secured with: tape       Ease of procedure: easy       Dentition: Intact       Dental guard used and removed. Dental Guard Type: Standard White.    Medication(s) Administered   Medication Administration Time: 12/5/2024 7:52 AM

## 2024-12-05 NOTE — ANESTHESIA PROCEDURE NOTES
"TAP Procedure Note    Pre-Procedure   Staff -        Anesthesiologist:  Navneet Barahona DO       Resident/Fellow: Lamont Marroquin MD       Performed By: resident and with residents       Procedure performed by resident/fellow/CRNA in presence of a teaching physician.         Location: pre-op       Pre-Anesthestic Checklist: patient identified, IV checked, site marked, risks and benefits discussed, informed consent, monitors and equipment checked and post-op pain management  Timeout:       Correct Patient: Yes        Correct Procedure: Yes        Correct Site: Yes        Correct Position: Yes        Correct Laterality: Yes        Site Marked: Yes  Procedure Documentation  Procedure: TAP       Diagnosis: PAIN CONTROL       Laterality: bilateral       Patient Position: supine       Skin prep: Chloraprep       Needle Type: short bevel       Needle Gauge: 21.        Needle Length (millimeters): 110        Ultrasound guided       1. Ultrasound was used to identify targeted nerve, plexus, vascular marker, or fascial plane and place a needle adjacent to it in real-time.       2. Ultrasound was used to visualize the spread of anesthetic in close proximity to the above referenced structure.       3. A permanent image is entered into the patient's record.    Assessment/Narrative         The placement was negative for: blood aspirated and painful injection       Paresthesias: No.       Bolus given via needle..        Secured via.        Complications: none    Medication(s) Administered   Bupivacaine 0.25% PF (Infiltration) - Infiltration   20 mL - 12/5/2024 6:52:00 AM  Bupivacaine liposome (Exparel) 1.3% LA inj susp (Infiltration) - Infiltration   20 mL - 12/5/2024 6:50:00 AM    FOR [facility] ([facility]) ONLY:   Pain Team Contact information: please page the Pain Team Via Vudu. Search \"Pain\". During daytime hours, please page the attending first. At night please page the resident first.      "

## 2024-12-05 NOTE — OR NURSING
Bilateral TAP block done without complications or pt complaints. 1mg of versed given and 50 mcg of fentanyl pre block.

## 2024-12-05 NOTE — ANESTHESIA POSTPROCEDURE EVALUATION
Patient: Karlos Terrazas    Procedure: Procedure(s):  Laparoscopic ileocecectomy with KONO-S anastomosis       Anesthesia Type:  General    Note:  Disposition: Admission   Postop Pain Control: Uneventful            Sign Out: Well controlled pain   PONV: No   Neuro/Psych: Uneventful            Sign Out: Acceptable/Baseline neuro status   Airway/Respiratory: Uneventful            Sign Out: Acceptable/Baseline resp. status   CV/Hemodynamics: Uneventful            Sign Out: Acceptable CV status; No obvious hypovolemia; No obvious fluid overload   Other NRE: NONE   DID A NON-ROUTINE EVENT OCCUR? No           Last vitals:  Vitals Value Taken Time   /70 12/05/24 1315   Temp 36.9  C (98.4  F) 12/05/24 1245   Pulse 98 12/05/24 1315   Resp 19 12/05/24 1315   SpO2 100 % 12/05/24 1315   Vitals shown include unfiled device data.    Electronically Signed By: Julien Orantes MD  December 5, 2024  3:09 PM

## 2024-12-05 NOTE — ANESTHESIA CARE TRANSFER NOTE
Patient: Karlos Terrazas    Procedure: Procedure(s):  Laparoscopic ileocecectomy with KONO-S anastomosis       Diagnosis: Crohn's disease of small intestine without complication (H) [K50.00]  Diagnosis Additional Information: No value filed.    Anesthesia Type:   General     Note:    Oropharynx: oropharynx clear of all foreign objects  Level of Consciousness: awake  Oxygen Supplementation: face mask  Level of Supplemental Oxygen (L/min / FiO2): 6  Independent Airway: airway patency satisfactory and stable  Dentition: dentition unchanged  Vital Signs Stable: post-procedure vital signs reviewed and stable  Report to RN Given: handoff report given  Patient transferred to: PACU    Handoff Report: Identifed the Patient, Identified the Reponsible Provider, Reviewed the pertinent medical history, Discussed the surgical course, Reviewed Intra-OP anesthesia mangement and issues during anesthesia, Set expectations for post-procedure period and Allowed opportunity for questions and acknowledgement of understanding      Vitals:  Vitals Value Taken Time   /77 12/05/24 1045   Temp 36.3    Pulse 88 12/05/24 1049   Resp 12 12/05/24 1049   SpO2 100 % 12/05/24 1049   Vitals shown include unfiled device data.    Electronically Signed By: LUCAS Bear CRNA  December 5, 2024  10:49 AM

## 2024-12-05 NOTE — ANESTHESIA PREPROCEDURE EVALUATION
Anesthesia Pre-Procedure Evaluation    Patient: Karlos Terrazas   MRN: [id number] : 1985        Procedure : Procedure(s):  Laparoscopic ileocecectomy with KONO-S anastomosis          Past Medical History:   Diagnosis Date    Anemia 2023    Crohn's disease    Crohn's disease of small intestine without complication (H)       Past Surgical History:   Procedure Laterality Date    COLONOSCOPY N/A 2023    Procedure: COLONOSCOPY, WITH BIOPSY;  Surgeon: Lamont Seaman MD;  Location: [facility] OR    LAPAROSCOPIC APPENDECTOMY N/A 2023    STILL HAS APPENDIX - actually had a diagnostic laparoscopy and Crohn's was diagnosed    LASIK  2019      Allergies   Allergen Reactions    Other Food Allergy Angioedema     carrots      Social History     Tobacco Use    Smoking status: Never    Smokeless tobacco: Never   Substance Use Topics    Alcohol use: Not Currently     Comment: 0 drinks per week      Wt Readings from Last 1 Encounters:   24 66.9 kg (147 lb 7.8 oz)        Anesthesia Evaluation   Pt has had prior anesthetic.     History of anesthetic complications  - PONV.      ROS/MED HX  ENT/Pulmonary:  - neg pulmonary ROS     Neurologic:  - neg neurologic ROS     Cardiovascular:  - neg cardiovascular ROS     METS/Exercise Tolerance: >4 METS    Hematologic:       Musculoskeletal:       GI/Hepatic: Comment: Crohn's disease, terminal ileum stricture   (-) GERD   Renal/Genitourinary:  - neg Renal ROS     Endo:       Psychiatric/Substance Use:       Infectious Disease:       Malignancy:       Other:            Physical Exam    Airway        Mallampati: III   TM distance: > 3 FB   Neck ROM: full   Mouth opening: > 3 cm    Respiratory Devices and Support         Dental     Comment: Patient reports no loose or chipped teeth        Cardiovascular          Rhythm and rate: regular and normal     Pulmonary           breath sounds clear to auscultation           OUTSIDE LABS:  CBC:   Lab Results   Component Value  "Date    WBC 5.1 11/21/2024    WBC 4.9 09/24/2024    HGB 14.0 11/21/2024    HGB 14.0 09/24/2024    HCT 40.8 11/21/2024    HCT 41.6 09/24/2024     11/21/2024     09/24/2024     BMP:   Lab Results   Component Value Date     11/21/2024     05/10/2023    POTASSIUM 4.3 11/21/2024    POTASSIUM 3.8 05/10/2023    CHLORIDE 106 11/21/2024    CHLORIDE 105 05/10/2023    CO2 28 11/21/2024    CO2 28 05/10/2023    BUN 12.4 11/21/2024    BUN 16.1 05/10/2023    CR 0.91 11/21/2024    CR 0.90 05/10/2023    GLC 98 12/05/2024    GLC 90 11/21/2024     COAGS: No results found for: \"PTT\", \"INR\", \"FIBR\"  POC: No results found for: \"BGM\", \"HCG\", \"HCGS\"  HEPATIC:   Lab Results   Component Value Date    ALBUMIN 4.4 11/21/2024    PROTTOTAL 7.3 11/21/2024    ALT 13 11/21/2024    AST 22 11/21/2024    ALKPHOS 89 11/21/2024    BILITOTAL 0.4 11/21/2024     OTHER:   Lab Results   Component Value Date    SABRINA 9.4 11/21/2024    LIPASE 21 02/21/2023    TSH 1.96 03/12/2024    CRP 1.3 (H) 05/03/2023    SED 16 (H) 01/16/2024       Anesthesia Plan    ASA Status:  3    NPO Status:  NPO Appropriate    Anesthesia Type: General.         Techniques and Equipment:     - Lines/Monitors: 2nd IV     Consents    Anesthesia Plan(s) and associated risks, benefits, and realistic alternatives discussed. Questions answered and patient/representative(s) expressed understanding.     - Discussed:     - Discussed with:  Patient      - Extended Intubation/Ventilatory Support Discussed: No.      - Patient is DNR/DNI Status: No     Use of blood products discussed: Yes.     - Discussed with: Patient.     - Consented: consented to blood products     Postoperative Care       PONV prophylaxis: Ondansetron (or other 5HT-3), Dexamethasone or Solumedrol, Scopolamine patch, Background Propofol Infusion     Comments:               Julien Orantes MD    I have reviewed the pertinent notes and labs in the chart from the past 30 days and (re)examined the patient.  Any " updates or changes from those notes are reflected in this note.

## 2024-12-05 NOTE — OP NOTE
"PREOPERATIVE DIAGNOSIS:  1. Crohn's disease      POSTOPERATIVE DIAGNOSIS:   1. Same    PROCEDURE:  1. Laparoscopic right colectomy with KONO-S anastomosis    ANESTHESIA: General endotracheal anesthesia plus local anesthesia.    SURGEON:  Talat Osorio M.D.    ASSISTANT(S): Reece Rodriguez MD, CRS Fellow; Yogesh Schmidt MD, R3.    INDICATIONS FOR PROCEDURE  Karlos Terrazas is a 39 year old male who presented with  Crohn's disease located to the TI. I thoroughly discussed the risks, benefits, and alternatives of operative treatment with the patient and he agreed to proceed.    General risks related to abdominal surgery were reviewed with the patient. These include, but are not limited to, death, myocardial infarction, pneumonia, urinary tract infection, deep venous thrombosis with or without pulmonary embolus, abdominal infection from bowel injury or abscess, fistula, anastomotic leak that may require reoperation and a stoma, ureteral injury, bowel obstruction, wound infection, and bleeding.    OPERATIVE PROCEDURE: After obtaining informed consent, the patient was brought to the operating room and placed in the supine position. Appropriate preoperative mechanical and chemical deep venous thrombosis prophylaxis, as well as preoperative prophylactic parenteral antibiotics were given. General endotracheal anesthesia was gently induced. Bilateral lower extremity pneumatic compression devices were applied and all pressure points were cushioned. A Talavera catheter was inserted without difficulty. The abdomen was then preped and draped in the standard sterile fashion.     After a \"time-out\" was performed, a 12mm infraumbilical incision was made and a 12mm trocar was inserted in an open fashion. Pneumoperitoneum was established with CO2 without difficulty to a pressure pf 12 mmHG. A 10mm 30 degree angle laparoscope was then used to explore the peritoneal cavity. No evidence of bleeding, bowel injury or metastatic disease was " visualized. Additional 5mm trocars were placed on the left site of the abdomen under direct vision. The small bowel and omentum were then displaced towards the left side of the peritoneal cavity and upwards, respectively, while the right colon was tented caudally and laterally. The ileocolic vessels were then identified and dissected with the ligasure device taking care to fully visualize and protect the duodenum and right ureter. The ileocolic artery was divided with a Ligasure device. No bleeding was visualized. The cecum, ascending colon and proximal transverse colon were mobilized using a medial-to-lateral technique and the mesocolon was divided with a Ligusure device up to the required bowel segments for our resection and anastomosis.The lateral attachments of the appendix and ascending colon were then divided.     After obtaining sufficient mobilization of the colon to perform our resection, a 4-cm periumbilical extraction incision was made and an Gagan wound protector was placed. The terminal ileum, ascending colon and proximal transverse colon were brought through the incision. The terminal ileum and transverse colon were transected perpendicularly to the mesentery with a single fire of the RAMSES stapler 75 mm with a blue load. The two staple lines were sewn together to provide the supporting column with 3-0  Vicryl. A 7 cm longitudinal enterotomy and colotomy were made.  The back wall was sutured with interrupted 3-0 Vicryl sutures. The inner layer was sutured with running 3-0 PDS. The anterior secon layer was then fashioned with Lembert's sutures of 3-0  Vicryl.    The bowel was then returned to the peritoneal cavity. Instrument, sponge, and needle counts were all correct, as reported to me, at this time. The periumbilical extraction incision fascia was re-approximated with interrupted 0 PDS sutures. Wounds were irrigated and dried, and hemostasis was corroborated. The dermis was re-approximated with a  running 4-0 Vicryl suture. Skin incisions were re-approximated with running subcuticular 4-0 Monocryl sutures and Dermabond was applied. The patient tolerated the procedure well.    COMPLICATIONS: none.    ESTIMATED BLOOD LOSS: 20mL.    REPLACEMENT:     - Crystalloid: 800mL.    UOP: 70cc    SPECIMEN(S): terminal ileum, vermiform appendix, ascending colon, proximal transverse colon, mesocolon, and partial omentectomy.    OPERATIVE COUNT: Complete.    OPERATIVE FINDINGS: stapled ileocolonic anastomosis. No evidence of metastatic disease.    This procedure was not performed to treat colon cancer through resection      Talat Osorio MD    Division of Colon & Rectal Surgery  Department of Surgery  [facility]  [phone]

## 2024-12-05 NOTE — PLAN OF CARE
Goal Outcome Evaluation:    Plan of Care Reviewed With: patient, spouse    Overall Patient Progress: no changeOverall Patient Progress: no change    Outcome Evaluation: manage pain    1500 - 2300     POD # 0     A & O x 4, calls appropriately   Afebrile, OVSS on RA, on capno, on continuous pulse oximetry   C/o abdominal/incisional pain, pain was managed w/ scheduled tylenol and PRN oxycodone x2   Denies n/v, dizziness, increased SOB and chest pain  Midline incision and x2 lap sites are CDI and JULIUS, abdominal binder is in place for comfort  Tolerating full liquid diet w/ good appetite  Voiding spontaneously w/o difficulty and w good UOP  - flatus, and no BM during shift  Independent in the room, and to the bathroom  x2 PIV, L PIV infusing LR at 50 mL/hr, nd R PIV Sl'd

## 2024-12-05 NOTE — PLAN OF CARE
A/OX4. VSS, weaned to RA. Sats stable at  99%. Pain manageable upon arrival to unit. Scheduled tylenol given x1. Pt aware that PRN oxycodone or dilaudid available upon request. Continuous LR infusing at 50mL/hr thru PIV. Has not dangled or been OOB yet. Had some ice chips and tolerated well. Wife in room supportive of patient. Resting between cares.     Nursing Focus: Admission    D: Patient admitted/transferred from PACU via stretcher for Laparoscopic right colectomy with KONO-S anastomosis.      I: Upon arrival to the unit patient was oriented to room, unit, and call light. Patient s height, weight, and vital signs were obtained. Allergies reviewed and allergy band applied. MD notified of patient s arrival on the unit. Adult AVS completed. Head to toe assessment completed. Education assessment completed. Care plan initiated.     A: Vital signs stable upon admission. Patient rates pain at 4/10. Two RN skin assessment needs to be completed. Significant Skin Findings include Midline incision and 3 lap sites JULIUS. [facility] Nurse Consult Ordered - No. Bed Algorithm can be found in PCS flow sheets (Support Surface Algorithm) and on IP [facility] NURSE RESOURCE TAB, was this used during this assessment? No     P: Continue to monitor patient s Vitals, pain and EtCO2 and intervene as needed. Continue with plan of care. Notify MD with any concerns or changes in patient status.     Problem: Pain Acute  Goal: Optimal Pain Control and Function  12/5/2024 1407 by Lizette Lane, RN  Outcome: Progressing  12/5/2024 1407 by Lizette Lane, RN  Outcome: Progressing  Intervention: Develop Pain Management Plan  Recent Flowsheet Documentation  Taken 12/5/2024 1349 by Lizette Lane, RN  Pain Management Interventions: cold applied  Intervention: Prevent or Manage Pain  Recent Flowsheet Documentation  Taken 12/5/2024 1349 by Lizette Lane, RN  Medication Review/Management:   medications reviewed   high-risk medications identified     Problem: Adult  "Inpatient Plan of Care  Goal: Plan of Care Review  Description: The Plan of Care Review/Shift note should be completed every shift.  The Outcome Evaluation is a brief statement about your assessment that the patient is improving, declining, or no change.  This information will be displayed automatically on your shift  note.  12/5/2024 1407 by Lizette Lane RN  Outcome: Progressing  12/5/2024 1407 by Lizette Lane RN  Outcome: Progressing  Flowsheets (Taken 12/5/2024 1407)  Plan of Care Reviewed With:   patient   spouse  Overall Patient Progress: no change  Goal: Patient-Specific Goal (Individualized)  Description: You can add care plan individualizations to a care plan. Examples of Individualization might be:  \"Parent requests to be called daily at 9am for status\", \"I have a hard time hearing out of my right ear\", or \"Do not touch me to wake me up as it startles  me\".  12/5/2024 1407 by Lizette Lane RN  Outcome: Progressing  12/5/2024 1407 by Lizette Lane RN  Outcome: Progressing  Goal: Absence of Hospital-Acquired Illness or Injury  12/5/2024 1407 by Lizette Lane RN  Outcome: Progressing  12/5/2024 1407 by Lizette Lane RN  Outcome: Progressing  Intervention: Identify and Manage Fall Risk  Recent Flowsheet Documentation  Taken 12/5/2024 1400 by Lizette Lane RN  Safety Promotion/Fall Prevention: safety round/check completed  Taken 12/5/2024 1349 by Lizette Lane RN  Safety Promotion/Fall Prevention:   assistive device/personal items within reach   clutter free environment maintained   increased rounding and observation   increase visualization of patient   nonskid shoes/slippers when out of bed   patient and family education   room near nurse's station   room organization consistent   safety round/check completed  Intervention: Prevent Skin Injury  Recent Flowsheet Documentation  Taken 12/5/2024 1349 by Lizette Lane RN  Body Position:   turned   left   side-lying  Intervention: Prevent " Infection  Recent Flowsheet Documentation  Taken 12/5/2024 1349 by Lizette Lane RN  Infection Prevention:   environmental surveillance performed   equipment surfaces disinfected   hand hygiene promoted   rest/sleep promoted   single patient room provided  Goal: Optimal Comfort and Wellbeing  12/5/2024 1407 by Lizette Lane RN  Outcome: Progressing  12/5/2024 1407 by Lizette Lane RN  Outcome: Progressing  Intervention: Monitor Pain and Promote Comfort  Recent Flowsheet Documentation  Taken 12/5/2024 1349 by Lizette Lane RN  Pain Management Interventions: cold applied  Goal: Readiness for Transition of Care  12/5/2024 1407 by Lizette Lane RN  Outcome: Progressing  12/5/2024 1407 by Lizette Lane RN  Outcome: Progressing     Problem: Surgery Nonspecified  Goal: Absence of Bleeding  12/5/2024 1407 by Lizette Lane RN  Outcome: Progressing  12/5/2024 1407 by Lizette Lane RN  Outcome: Progressing  Goal: Effective Bowel Elimination  12/5/2024 1407 by Lizette Lane RN  Outcome: Progressing  12/5/2024 1407 by Lizette Lane RN  Outcome: Not Progressing  Goal: Fluid and Electrolyte Balance  12/5/2024 1407 by Lizette Lane RN  Outcome: Progressing  12/5/2024 1407 by Lizette Lane RN  Outcome: Progressing  Goal: Blood Glucose Level Within Targeted Range  12/5/2024 1407 by Lizette Lane RN  Outcome: Progressing  12/5/2024 1407 by Lizette Lane RN  Outcome: Progressing  Goal: Absence of Infection Signs and Symptoms  12/5/2024 1407 by Lizette Lane RN  Outcome: Progressing  12/5/2024 1407 by Lizette Lane RN  Outcome: Progressing  Goal: Anesthesia/Sedation Recovery  12/5/2024 1407 by Lizette Lane RN  Outcome: Progressing  12/5/2024 1407 by Lizette Lane RN  Outcome: Progressing  Intervention: Optimize Anesthesia Recovery  Recent Flowsheet Documentation  Taken 12/5/2024 1400 by Lizette Lane RN  Safety Promotion/Fall Prevention: safety round/check completed  Taken 12/5/2024 1349 by Lizette Lane RN  Safety  Promotion/Fall Prevention:   assistive device/personal items within reach   clutter free environment maintained   increased rounding and observation   increase visualization of patient   nonskid shoes/slippers when out of bed   patient and family education   room near nurse's station   room organization consistent   safety round/check completed  Goal: Optimal Pain Control and Function  12/5/2024 1407 by Lizette Lane RN  Outcome: Progressing  12/5/2024 1407 by Lizette Lane RN  Outcome: Progressing  Intervention: Prevent or Manage Pain  Recent Flowsheet Documentation  Taken 12/5/2024 1349 by Lizette Lane RN  Pain Management Interventions: cold applied  Goal: Nausea and Vomiting Relief  12/5/2024 1407 by Lizette Lane RN  Outcome: Progressing  12/5/2024 1407 by Lizette Lane RN  Outcome: Progressing  Intervention: Prevent or Manage Nausea and Vomiting  Recent Flowsheet Documentation  Taken 12/5/2024 1349 by Lizette Lane RN  Nausea/Vomiting Interventions: cool cloth applied  Goal: Effective Urinary Elimination  12/5/2024 1407 by Lizette Lane RN  Outcome: Progressing  12/5/2024 1407 by Lizette Lane RN  Outcome: Progressing  Goal: Effective Oxygenation and Ventilation  12/5/2024 1407 by Lizette Lane RN  Outcome: Progressing  12/5/2024 1407 by Lizette Lane RN  Outcome: Progressing  Intervention: Optimize Oxygenation and Ventilation  Recent Flowsheet Documentation  Taken 12/5/2024 1349 by Lizette Lane RN  Activity Management: activity encouraged  Head of Bed (HOB) Positioning: HOB at 30 degrees   Goal Outcome Evaluation:      Plan of Care Reviewed With: patient, spouse    Overall Patient Progress: no changeOverall Patient Progress: no change

## 2024-12-05 NOTE — PHARMACY-ADMISSION MEDICATION HISTORY
Pharmacist Admission Medication History    Admission medication history is complete. The information provided in this note is only as accurate as the sources available at the time of the update.    Information Source(s):  Pre-op RN assessment, Dispense history (Surescripts)     Pertinent information: pt receives infliximab via home infusion    Medication History Completed By: Lexii Stewart [facility] 12/5/2024 2:10 PM    PTA Med List   Medication Sig Last Dose/Taking    CALCIUM-VITAMIN D PO Take 1 tablet by mouth daily Past Week    carboxymethylcellulose PF (CARBOXYMETHYLCELLULOSE SODIUM) 0.5 % ophthalmic solution Place 1 drop into both eyes 4 times daily Preservative free artificial tears, single use vials. Past Month    FERROUS BISGLYCINATE CHELATE PO Take 1 tablet by mouth three times a week 12/1/2024    magnesium citrate 1.745 GM/30ML solution Drink 1 bottle at 8pm the night before surgery 12/4/2024    metroNIDAZOLE (FLAGYL) 500 MG tablet Take 1 tablet (500 mg) by mouth every 6 hours. At 8:00 am, 2:00 pm, 8:00 pm the day prior to your surgery with neomycin and zofran. 12/4/2024 at  8:00 PM    neomycin (MYCIFRADIN) 500 MG tablet Take 2 tablets (1,000 mg) by mouth every 6 hours. At 8:00 am, 2:00 pm, 8:00 pm the day prior to your surgery with flagyl and zofran. 12/4/2024 at  8:00 PM    ondansetron (ZOFRAN) 4 MG tablet Take 1 tablet (4 mg) by mouth every 6 hours. At 8:00 am, 2:00 pm, 8:00 pm the day prior to your surgery with neomycin and flagyl. 12/4/2024 at  8:00 PM    polyethylene glycol (MIRALAX) 17 GM/Dose powder Please take 238 grams mixed with 64 oz of Gatorade at 4pm the night before surgery 12/4/2024 at  4:00 PM

## 2024-12-05 NOTE — PROGRESS NOTES
Post Op Check    12/05/2024    Karlos Terrazas is a 39 year old male with h/o crohn's with ileitis and multifocal strictures      Now POD#0  12/5/2024   Procedure(s):  Laparoscopic ileocecectomy with KONO-S anastomosis     Patient reports he is doing well, he had some nausea when he woke up from surgery but it's gotten better. So far had some sips of water.  Denies SOB, chest pain, or dizziness. No flatus/ no BM. Voiding urine. Family by the bedside.     /69   Pulse 79   Temp 97.9  F (36.6  C) (Oral)   Resp 15   Ht 1.829 m (6')   Wt 66.9 kg (147 lb 7.8 oz)   SpO2 98%   BMI 20.00 kg/m        Intake/Output Summary (Last 24 hours) at 12/5/2024 1625  Last data filed at 12/5/2024 1300  Gross per 24 hour   Intake 850 ml   Output 90 ml   Net 760 ml       ROUTINE IP LABS (Last four results)  BMP  Recent Labs   Lab 12/05/24  0626   GLC 98     CBC  Recent Labs   Lab 12/05/24  1445        INRNo lab results found in last 7 days.     Gen: A&O x3, NAD  Chest: breathing non-labored  Abdomen: soft, appropriately tender R> L, non-distended. Abdominal binder in placed.   Incision: clean, dry, intact  Extremities: warm and well perfused      A/P:   No acute post-op issues. Continue plan of care per primary team. Please call with any questions.    #Post-op pain  - tylenol  - PRN oxycodone, dilaudid  GI  #S/p Laparoscopic ileocecectomy with KONO-S anastomosis   #Post op nausea  - Diet: FLD  - Scopolamine patch  - PRN antiemetics: zofran  - Bowel Regimen: none  - mIVF 50ml/hr    DVT prophylaxis: SQH  Lines: BIRD Timmons, DO [facility]  General Surgery PGY1

## 2024-12-06 LAB
ANION GAP SERPL CALCULATED.3IONS-SCNC: 9 MMOL/L (ref 7–15)
BUN SERPL-MCNC: 7.7 MG/DL (ref 6–20)
CALCIUM SERPL-MCNC: 9.1 MG/DL (ref 8.8–10.4)
CHLORIDE SERPL-SCNC: 104 MMOL/L (ref 98–107)
CREAT SERPL-MCNC: 0.94 MG/DL (ref 0.67–1.17)
EGFRCR SERPLBLD CKD-EPI 2021: >90 ML/MIN/1.73M2
ERYTHROCYTE [DISTWIDTH] IN BLOOD BY AUTOMATED COUNT: 12.5 % (ref 10–15)
GLUCOSE BLDC GLUCOMTR-MCNC: 100 MG/DL (ref 70–99)
GLUCOSE SERPL-MCNC: 97 MG/DL (ref 70–99)
HCO3 SERPL-SCNC: 24 MMOL/L (ref 22–29)
HCT VFR BLD AUTO: 36.7 % (ref 40–53)
HGB BLD-MCNC: 12.3 G/DL (ref 13.3–17.7)
MAGNESIUM SERPL-MCNC: 2 MG/DL (ref 1.7–2.3)
MCH RBC QN AUTO: 29.4 PG (ref 26.5–33)
MCHC RBC AUTO-ENTMCNC: 33.5 G/DL (ref 31.5–36.5)
MCV RBC AUTO: 88 FL (ref 78–100)
PHOSPHATE SERPL-MCNC: 2.5 MG/DL (ref 2.5–4.5)
PLATELET # BLD AUTO: 295 10E3/UL (ref 150–450)
POTASSIUM SERPL-SCNC: 3.8 MMOL/L (ref 3.4–5.3)
RBC # BLD AUTO: 4.18 10E6/UL (ref 4.4–5.9)
SODIUM SERPL-SCNC: 137 MMOL/L (ref 135–145)
WBC # BLD AUTO: 10.7 10E3/UL (ref 4–11)

## 2024-12-06 PROCEDURE — 83735 ASSAY OF MAGNESIUM: CPT | Performed by: SURGERY

## 2024-12-06 PROCEDURE — 80048 BASIC METABOLIC PNL TOTAL CA: CPT | Performed by: SURGERY

## 2024-12-06 PROCEDURE — 250N000013 HC RX MED GY IP 250 OP 250 PS 637: Performed by: SURGERY

## 2024-12-06 PROCEDURE — 82310 ASSAY OF CALCIUM: CPT | Performed by: SURGERY

## 2024-12-06 PROCEDURE — 250N000011 HC RX IP 250 OP 636

## 2024-12-06 PROCEDURE — 82565 ASSAY OF CREATININE: CPT | Performed by: SURGERY

## 2024-12-06 PROCEDURE — 36415 COLL VENOUS BLD VENIPUNCTURE: CPT | Performed by: SURGERY

## 2024-12-06 PROCEDURE — 250N000011 HC RX IP 250 OP 636: Performed by: SURGERY

## 2024-12-06 PROCEDURE — 84100 ASSAY OF PHOSPHORUS: CPT | Performed by: SURGERY

## 2024-12-06 PROCEDURE — 85014 HEMATOCRIT: CPT | Performed by: SURGERY

## 2024-12-06 PROCEDURE — 258N000003 HC RX IP 258 OP 636: Performed by: SURGERY

## 2024-12-06 PROCEDURE — 250N000013 HC RX MED GY IP 250 OP 250 PS 637: Performed by: PHYSICIAN ASSISTANT

## 2024-12-06 PROCEDURE — 120N000002 HC R&B MED SURG/OB UMMC

## 2024-12-06 PROCEDURE — 250N000011 HC RX IP 250 OP 636: Performed by: PHYSICIAN ASSISTANT

## 2024-12-06 RX ORDER — INFLIXIMAB 100 MG/10ML
10 INJECTION, POWDER, LYOPHILIZED, FOR SOLUTION INTRAVENOUS
COMMUNITY
Start: 2024-12-06

## 2024-12-06 RX ORDER — ENOXAPARIN SODIUM 100 MG/ML
40 INJECTION SUBCUTANEOUS EVERY 24 HOURS
Status: DISCONTINUED | OUTPATIENT
Start: 2024-12-06 | End: 2024-12-09 | Stop reason: HOSPADM

## 2024-12-06 RX ORDER — POTASSIUM CHLORIDE 750 MG/1
20 TABLET, EXTENDED RELEASE ORAL ONCE
Status: COMPLETED | OUTPATIENT
Start: 2024-12-06 | End: 2024-12-06

## 2024-12-06 RX ORDER — OXYCODONE HYDROCHLORIDE 5 MG/1
5-10 TABLET ORAL EVERY 6 HOURS PRN
Qty: 18 TABLET | Refills: 0 | Status: SHIPPED | OUTPATIENT
Start: 2024-12-07

## 2024-12-06 RX ORDER — ENOXAPARIN SODIUM 100 MG/ML
40 INJECTION SUBCUTANEOUS EVERY 24 HOURS
Qty: 12 ML | Refills: 0 | Status: SHIPPED | OUTPATIENT
Start: 2024-12-07 | End: 2025-01-06

## 2024-12-06 RX ORDER — ACETAMINOPHEN 500 MG
1000 TABLET ORAL EVERY 6 HOURS
Qty: 100 TABLET | Refills: 0 | Status: SHIPPED | OUTPATIENT
Start: 2024-12-06

## 2024-12-06 RX ORDER — INFLIXIMAB 100 MG/10ML
700 INJECTION, POWDER, LYOPHILIZED, FOR SOLUTION INTRAVENOUS
COMMUNITY
Start: 2024-12-06

## 2024-12-06 RX ADMIN — OXYCODONE HYDROCHLORIDE 5 MG: 5 TABLET ORAL at 08:45

## 2024-12-06 RX ADMIN — OXYCODONE HYDROCHLORIDE 10 MG: 10 TABLET ORAL at 17:22

## 2024-12-06 RX ADMIN — ACETAMINOPHEN 1000 MG: 500 TABLET ORAL at 08:46

## 2024-12-06 RX ADMIN — ONDANSETRON 4 MG: 4 TABLET, ORALLY DISINTEGRATING ORAL at 15:49

## 2024-12-06 RX ADMIN — ONDANSETRON 4 MG: 4 TABLET, ORALLY DISINTEGRATING ORAL at 21:50

## 2024-12-06 RX ADMIN — ACETAMINOPHEN 1000 MG: 500 TABLET ORAL at 02:53

## 2024-12-06 RX ADMIN — OXYCODONE HYDROCHLORIDE 5 MG: 5 TABLET ORAL at 03:19

## 2024-12-06 RX ADMIN — HEPARIN SODIUM 5000 UNITS: 5000 INJECTION, SOLUTION INTRAVENOUS; SUBCUTANEOUS at 03:13

## 2024-12-06 RX ADMIN — SODIUM CHLORIDE, POTASSIUM CHLORIDE, SODIUM LACTATE AND CALCIUM CHLORIDE: 600; 310; 30; 20 INJECTION, SOLUTION INTRAVENOUS at 02:54

## 2024-12-06 RX ADMIN — ENOXAPARIN SODIUM 40 MG: 40 INJECTION SUBCUTANEOUS at 10:50

## 2024-12-06 RX ADMIN — POTASSIUM PHOSPHATE, MONOBASIC 500 MG: 500 TABLET, SOLUBLE ORAL at 15:49

## 2024-12-06 RX ADMIN — PROCHLORPERAZINE EDISYLATE 10 MG: 5 INJECTION INTRAMUSCULAR; INTRAVENOUS at 17:17

## 2024-12-06 RX ADMIN — ACETAMINOPHEN 1000 MG: 500 TABLET ORAL at 20:01

## 2024-12-06 RX ADMIN — OXYCODONE HYDROCHLORIDE 5 MG: 5 TABLET ORAL at 13:22

## 2024-12-06 RX ADMIN — POTASSIUM CHLORIDE 20 MEQ: 750 TABLET, EXTENDED RELEASE ORAL at 09:28

## 2024-12-06 RX ADMIN — POTASSIUM PHOSPHATE, MONOBASIC 500 MG: 500 TABLET, SOLUBLE ORAL at 09:28

## 2024-12-06 RX ADMIN — ACETAMINOPHEN 1000 MG: 500 TABLET ORAL at 15:49

## 2024-12-06 ASSESSMENT — ACTIVITIES OF DAILY LIVING (ADL)
ADLS_ACUITY_SCORE: 30
ADLS_ACUITY_SCORE: 33
ADLS_ACUITY_SCORE: 33
ADLS_ACUITY_SCORE: 31
ADLS_ACUITY_SCORE: 33
ADLS_ACUITY_SCORE: 31
ADLS_ACUITY_SCORE: 33
ADLS_ACUITY_SCORE: 33
ADLS_ACUITY_SCORE: 31
ADLS_ACUITY_SCORE: 31
ADLS_ACUITY_SCORE: 33
ADLS_ACUITY_SCORE: 33
ADLS_ACUITY_SCORE: 31
ADLS_ACUITY_SCORE: 30
ADLS_ACUITY_SCORE: 33
ADLS_ACUITY_SCORE: 30
ADLS_ACUITY_SCORE: 31
ADLS_ACUITY_SCORE: 30

## 2024-12-06 NOTE — PROGRESS NOTES
Nursing Focus: Admission    A: Two RN skin assessment completed Yes. Second RN was Livia OWENS Significant Skin Findings include overall skins is CDI, small midline incision and x2 lap sites CDI and JULIUS, x2 PIV. WO Nurse Consult Ordered No. Bed Algorithm can be found in PCS flow sheets (Support Surface Algorithm) and on IP [facility] NURSE RESOURCE TAB, was this used during this assessment? No.

## 2024-12-06 NOTE — PROVIDER NOTIFICATION
Provider notification    Notified provider Doretha Timmons; Patient is experiencing continuous nausea, shortness of breath, and numbness of hands and feet.  Danielle Kessler RN [phone]    Response: Provider added order for Compazine and came to bedside to assess patient.

## 2024-12-06 NOTE — DISCHARGE SUMMARY
[facility]  Discharge Summary  Colon and Rectal Surgery     Karlos Terrazas MRN# [id number]   YOB: 1985 Age: 39 year old     Date of Admission:  12/5/2024  Date of Discharge::  12/9/2024  Admitting Physician:  Talat Osorio MD  Discharge Physician:    Primary Care Physician:        Mary Velasquez          Admission Diagnoses:   Crohn's disease of small intestine without complication (H) [K50.00]            Discharge Diagnosis:   Crohn's disease  Hypophosphatemia  Hypomagnesemia  Hypokalemia           Procedures:   Procedure(s):  Laparoscopic ileocecectomy with KONO-S anastomosis               Consultations:   None         Imaging Studies:     Results for orders placed or performed during the hospital encounter of 12/05/24   POC US Guidance Needle Placement    Impression    TAP block              Medications Prior to Admission:     No medications prior to admission.              Discharge Medications:     Current Discharge Medication List        START taking these medications    Details   acetaminophen (TYLENOL) 500 MG tablet Take 2 tablets (1,000 mg) by mouth every 6 hours.  Qty: 100 tablet, Refills: 0    Associated Diagnoses: Acute post-operative pain      enoxaparin ANTICOAGULANT (LOVENOX) 40 MG/0.4ML syringe Inject 0.4 mLs (40 mg) subcutaneously every 24 hours.  Qty: 12 mL, Refills: 0    Associated Diagnoses: Crohn's disease of small intestine without complication (H)           CONTINUE these medications which have CHANGED    Details   !! inFLIXimab (REMICADE) 100 MG injection Add to infusion 70 mLs (700 mg) once every six weeks. PLEASE WAIT 4 WEEKS AFTER SURGERY BEFORE RESTARTING REMICADE.       Reconstitute infliximab vial(s).  Draw up infliximab 10 mg/mL in syringe with 21 G needle and add to NaCl 0.9% bag immediately prior to infusing.  MIX GENTLY BY INVERSION, DO NOT SHAKE. Discard remainder of vial(s).    Associated Diagnoses: Crohn's  "disease of small intestine without complication (H)      !! inFLIXimab, REMICADE, 100 MG injection Inject 699 mg into the vein once every six weeks. PLEASE WAIT 4 WEEKS AFTER SURGERY BEFORE RESUMING REMICADE    Associated Diagnoses: Crohn's disease with complication, unspecified gastrointestinal tract location (H)       !! - Potential duplicate medications found. Please discuss with provider.        CONTINUE these medications which have NOT CHANGED    Details   CALCIUM-VITAMIN D PO Take 1 tablet by mouth daily      carboxymethylcellulose PF (CARBOXYMETHYLCELLULOSE SODIUM) 0.5 % ophthalmic solution Place 1 drop into both eyes 4 times daily Preservative free artificial tears, single use vials.  Qty: 400 each, Refills: 11    Comments: Preservative free artificial tears, single use vials. Mello 3/flaxseed  Associated Diagnoses: Dry eye syndrome of both eyes      FERROUS BISGLYCINATE CHELATE PO Take 1 tablet by mouth three times a week      diphenhydrAMINE (BENADRYL) 50 MG/ML injection Inject 1 mL (50 mg) over 3-5 minutes into the vein via push as needed for other (infusion reaction). For RN use only.  Draw up in a syringe and administer IV push.  Discard remainder of vial.  Qty: 82562 mL, Refills: 0    Associated Diagnoses: Crohn's disease of small intestine without complication (H)      Emergency Supply Kit, PIV, Patient use for emergency only. Contents: 3 sodium chloride 0.9% flushes, 1 IV start kit, 1 microclave ext set 14\", 1 each IV Cath 22 G/1\" and 24G/3/4\", 6 alcohol prep pads, 4 nitrile gloves (med). Call [phone] to reorder.  Qty: [id number] kit, Refills: 0    Associated Diagnoses: Crohn's disease of small intestine without complication (H)      EPINEPHrine (ANY BX GENERIC EQUIV) 0.3 MG/0.3ML injection 2-pack Inject 0.3 mLs (0.3 mg) into the muscle as needed for anaphylaxis (infusion reaction). Administer into the mid-thigh in case of severe anaphylaxis (wheezing, throat tightening, mouth swelling, difficulty " breathing). May repeat dose one time in 5-15 minutes if symptoms persist.  Qty: 986248 mL, Refills: 0    Associated Diagnoses: Crohn's disease of small intestine without complication (H)      methylPREDNISolone Na Suc, PF, (SOLU-MEDROL) 125 mg/2 mL injection Inject 2 mLs (125 mg) over 3-5 minutes into the vein via push as needed (infusion reaction). For RN use only.  Reconstitute vial. Draw up methylPREDNISolone in a syringe and administer.  Discard remainder of vial.  Qty: 276598 mL, Refills: 0    Associated Diagnoses: Crohn's disease of small intestine without complication (H)      sodium chloride 0.9% bag Infuse 250 mLs over 1 hours into the vein once every six weeks. Add 70 mL (700 mg) of infliximab 10 mg/mL to bag immediately prior to infusing via gravity infusion. When complete, flush bag with 20 mL NS to flush tubing.  Qty: 872933 mL, Refills: 0    Associated Diagnoses: Crohn's disease of small intestine without complication (H)      !! sodium chloride 0.9% infusion Infuse 500 mLs into the vein as needed for other (infusion reaction). In case of mild reaction, administer via gravity at 20 mL/hr to keep vein open. In case of severe reaction, administer via gravity wide open on prime setting.  Qty: 149022 mL, Refills: 0    Associated Diagnoses: Crohn's disease of small intestine without complication (H)      !! sodium chloride, PF, 0.9% PF flush Inject 10 mLs into the vein as needed for other (infusion reaction). For RN use only as needed for infusion reaction  Qty: 984812 mL, Refills: 0    Associated Diagnoses: Crohn's disease of small intestine without complication (H)      !! sodium chloride, PF, 0.9% PF flush Inject 10 mLs into the vein as needed for line flush. Flush IV before and after medication administration as directed and/or at least every 12 hours.  Qty: 624964 mL, Refills: 0    Associated Diagnoses: Crohn's disease of small intestine without complication (H)      sterile water, preservative free,  injection Use 70 mLs for reconstitution once every six weeks. 1. Reconstitute each vial of infliximab with 10 mL of sterile water for injection by slowly injecting 10 mL sterile water down the inside wall of vial w/21 G needle. DO NOT SHAKE. Foaming of the solution on reconstitution is not unusual. Roll and tilt each vial gently.  2. Let drug stand for 5 minutes. 3. Inspect vials for particules and/or discoloration prior to continuing. The solution should be colorless to light yellow and opalescent, and may develop a few translucent particles. DO NOT USE IF DRUG HAS NOT FULLY DISSOLVED OR IF OPAQUE PARTICLES, DISCOLORATION OR OTHER FOREIGN PARTICULES ARE PRESENT. 4. Draw up appropriate dose w/ 21 G needle from vial.  Qty: 280 mL, Refills: 0    Associated Diagnoses: Crohn's disease of small intestine without complication (H)       !! - Potential duplicate medications found. Please discuss with provider.        STOP taking these medications       magnesium citrate 1.745 GM/30ML solution Comments:   Reason for Stopping:         metroNIDAZOLE (FLAGYL) 500 MG tablet Comments:   Reason for Stopping:         neomycin (MYCIFRADIN) 500 MG tablet Comments:   Reason for Stopping:         ondansetron (ZOFRAN) 4 MG tablet Comments:   Reason for Stopping:         polyethylene glycol (MIRALAX) 17 GM/Dose powder Comments:   Reason for Stopping:                        Brief History of Illness:   39 year old male with Crohns disease located at the TI with multifocal stricturing and narrowing, has been on Infliximab, now s/p Lap Right colectomy with KONO-S anastomosis on 12/5/2024.              Hospital Course:   Post-operatively pt was gently fluid resuscitated.  Alphonse is able to void appropriately.  Pt had eventual return of bowel function and was able to tolerate small amounts of a regular diet.     He was deemed appropriate for discharge home.   Pt was taught how to self-inject post-operative prophylactic lovenox for which he/she  is to do for a total of 30 days. Post-operative pain was controlled with scheduled tylenol and prn oxycodone.      Patient is to follow up in the Colon and Rectal Surgery Clinic in 2-3 week with Irina Guido NP or Annie Hancock PA-C and then with Dr. Osorio in 2-3 weeks after.          Day of Discharge Physical Exam:   Blood pressure 123/81, pulse 64, temperature 98.1  F (36.7  C), temperature source Oral, resp. rate 17, height 1.829 m (6'), weight 67.9 kg (149 lb 11.2 oz), SpO2 98%.    Gen: AAOx3, NAD  Pulm: Non-labored breathing  Abd: Soft, appropriately tender, no guarding. Abdominal binder in placed.    Incision C/D/I  Ext:  Warm and well-perfused         Final Pathology Result:   Pending at time of discharge           Discharge Instructions and Follow-Up:     Discharge Procedure Orders   Reason for your hospital stay   Order Comments: 12/5/2024:   PROCEDURE:  1. Laparoscopic right colectomy with KONO-S anastomosis     ANESTHESIA: General endotracheal anesthesia plus local anesthesia.     Activity   Order Comments: Your activity upon discharge:   ACTIVITY  -No lifting, pushing, pulling greater than 10 lbs and no strenuous exercise for 6 weeks   -No driving while on narcotic analgesics (i.e. Percocet, oxycodone, Vicodin)  -No driving until you are able to fully twist to both sides or slam on brakes quickly and without any pain  -We encourage walking at least 4-5 times per day     Order Specific Question Answer Comments   Is discharge order? Yes      Adult [facility] Follow-up and recommended labs and tests   Order Comments: WOUND CARE  -Inspect your wounds daily for signs of infection (increased redness, drainage, pain)  -Keep your wound clean and dry  -You may shower, but do not soak in tub or pool    NOTIFY  Please contact Rona RASHID RN, Yanet ARREGUIN RN, or Nisreen MADSEN RN at [phone] for problems after discharge such as:  -Temperature > 101F, chills, rigors, dizziness  -Redness around or purulent  drainage from wound  -Inability to tolerate diet, nausea or vomiting  -You stop passing gas, develop significant bloating, abdominal pain  -Have blood in stools/vomit  -Have severe diarrhea/constipation  -Any other questions or concerns.  - At nights (after 4:30pm), on weekends, or if urgent, call [phone] and ask the  to speak with the on-call Colorectal Surgery resident or fellow      Medication Instructions  Some of your medications may have changed. Please take only prescribed and resumed medications     FOLLOW-UP  1.  You will need to follow-up with Irina Guido NP or Annie Hancock PA-C in the Colon and Rectal Surgery clinic in 2-3 week(s) and then with CRS Staff: Dr. Talat Osorio in 4-5 weeks after.  Please contact our Nurses (phone # [phone]) if you have not heard from our clinic in 3 business days afer discharge to schedule a follow-up appointment.    2.  We recommend 30 days of post-operative prophylactic lovenox to help decrease risk of DVT/blood clots.      3.  Please wait 4 weeks after surgery before restarting remicade.          Appointments on [place] and/or [facility] (with [facility] or [facility] provider or service). Call [phone] if you haven't heard regarding these appointments within 7 days of discharge.     Diet   Order Comments: Follow this diet upon discharge:   DIET  - Regular Diet as tolerated  -We recommend eating slowly, chewing thoroughly, eating small frequent meals throughout the day  -Stay well hydrated.     Order Specific Question Answer Comments   Is discharge order? Yes             Home Health Care:     Not needed           Discharge Disposition:     Discharged to home      Condition at discharge: Negro Timmons, DO  General Surgery PGY-1      Pt was seen and discussed with Dr. Rodriguez    The above plan of care was performed and communicated to me by Dr. Osorio     I spent ____ minute face-to-face or coordinating care of Karlos Terrazas.  Over 50% of our time on the unit was spent counseling the patient and/or coordinating care as documented in the assessment and plan.     [id number] post op hospital visit

## 2024-12-06 NOTE — PLAN OF CARE
Goal Outcome Evaluation:      Plan of Care Reviewed With: patient    Overall Patient Progress: improvingOverall Patient Progress: improving     VSS, AxO x4, on room air. Regular diet - tolerating well. Up independently in room. Received potassium replacement. Managing pain with PRN oxycodone, scheduled tylenol for 6/10 pain with relief. L PIV - SL. LBM 12/5, voiding well. Pt denies N/V and SOB.

## 2024-12-06 NOTE — PLAN OF CARE
Goal Outcome Evaluation: [id number]      Plan of Care Reviewed With: patient    Overall Patient Progress: no change    Pt A&O x 4, VSS on RA, pain treated with scheduled Tylenol x 1 and oxycodone PRN x 1. Pt up with a standby assist to help with cords/lines, but steady on feet. Using toilet hat to track urine output. Not passing gas yet, and LBM 12/5 prior to surgery. LR running at 50 mL/hr. PIV x 2 in place. Denies nausea overnight, on full liquid diet and tolerating well. Midline incision JULIUS, lap sites x 2 JULIUS.

## 2024-12-06 NOTE — PROVIDER NOTIFICATION
Action: Rm 5224 RACHID BAUMAN - Pt is having adequate fluid intake, could his fluids be discontinued? Thanks Rona TREJO [phone]    Response: Fluids discontinued by provider.

## 2024-12-06 NOTE — PROGRESS NOTES
Cross-cover note: 12/06/2024    General Surgery Cross Cover Note    Called by nursing regarding nausea, SOA and numbness of hands and feet    Per patient he just vomited but the compazine is helping. Still has paresthesia of hands, feet is now resolved. Still has some shortness of air but its better    B/P: 129/75, T: 98.8, P: 61, R: 18    PE:  A&O x3, NAD  General: patient look uncomfortable  Breathing non-labored  Abd soft, appropriately tender, some distention, Abdominal binder in placed.   Extr. Warm to touch  Incisions clean, dry, intact    Plan:  - since he is improving I will check in on him in an hour and see how he is doing    1830  - patient reports he vomited again but feels better, SOA and paresthesia is gone.   - encouraged him to take it slow with PO intake and let nursing know of any more concerns.       GENA Timmons, DO  General Surgery PGY-1

## 2024-12-07 LAB
ANION GAP SERPL CALCULATED.3IONS-SCNC: 12 MMOL/L (ref 7–15)
BUN SERPL-MCNC: 11 MG/DL (ref 6–20)
CALCIUM SERPL-MCNC: 9.5 MG/DL (ref 8.8–10.4)
CHLORIDE SERPL-SCNC: 103 MMOL/L (ref 98–107)
CREAT SERPL-MCNC: 0.86 MG/DL (ref 0.67–1.17)
EGFRCR SERPLBLD CKD-EPI 2021: >90 ML/MIN/1.73M2
ERYTHROCYTE [DISTWIDTH] IN BLOOD BY AUTOMATED COUNT: 12.6 % (ref 10–15)
GLUCOSE BLDC GLUCOMTR-MCNC: 111 MG/DL (ref 70–99)
GLUCOSE SERPL-MCNC: 100 MG/DL (ref 70–99)
HCO3 SERPL-SCNC: 26 MMOL/L (ref 22–29)
HCT VFR BLD AUTO: 38.4 % (ref 40–53)
HGB BLD-MCNC: 13.2 G/DL (ref 13.3–17.7)
MAGNESIUM SERPL-MCNC: 2.1 MG/DL (ref 1.7–2.3)
MCH RBC QN AUTO: 29.9 PG (ref 26.5–33)
MCHC RBC AUTO-ENTMCNC: 34.4 G/DL (ref 31.5–36.5)
MCV RBC AUTO: 87 FL (ref 78–100)
PHOSPHATE SERPL-MCNC: 4.3 MG/DL (ref 2.5–4.5)
PLATELET # BLD AUTO: 311 10E3/UL (ref 150–450)
POTASSIUM SERPL-SCNC: 4 MMOL/L (ref 3.4–5.3)
RBC # BLD AUTO: 4.41 10E6/UL (ref 4.4–5.9)
SODIUM SERPL-SCNC: 141 MMOL/L (ref 135–145)
WBC # BLD AUTO: 10.5 10E3/UL (ref 4–11)

## 2024-12-07 PROCEDURE — 82565 ASSAY OF CREATININE: CPT

## 2024-12-07 PROCEDURE — 120N000002 HC R&B MED SURG/OB UMMC

## 2024-12-07 PROCEDURE — 36415 COLL VENOUS BLD VENIPUNCTURE: CPT

## 2024-12-07 PROCEDURE — 85041 AUTOMATED RBC COUNT: CPT

## 2024-12-07 PROCEDURE — 84100 ASSAY OF PHOSPHORUS: CPT

## 2024-12-07 PROCEDURE — 80048 BASIC METABOLIC PNL TOTAL CA: CPT

## 2024-12-07 PROCEDURE — 85018 HEMOGLOBIN: CPT

## 2024-12-07 PROCEDURE — 250N000011 HC RX IP 250 OP 636: Performed by: PHYSICIAN ASSISTANT

## 2024-12-07 PROCEDURE — 999N000128 HC STATISTIC PERIPHERAL IV START W/O US GUIDANCE

## 2024-12-07 PROCEDURE — 83735 ASSAY OF MAGNESIUM: CPT

## 2024-12-07 PROCEDURE — 250N000013 HC RX MED GY IP 250 OP 250 PS 637: Performed by: SURGERY

## 2024-12-07 PROCEDURE — 250N000011 HC RX IP 250 OP 636

## 2024-12-07 PROCEDURE — 80051 ELECTROLYTE PANEL: CPT

## 2024-12-07 PROCEDURE — 250N000011 HC RX IP 250 OP 636: Performed by: SURGERY

## 2024-12-07 RX ORDER — CYCLOBENZAPRINE HCL 5 MG
5 TABLET ORAL EVERY 8 HOURS PRN
Status: DISCONTINUED | OUTPATIENT
Start: 2024-12-07 | End: 2024-12-09 | Stop reason: HOSPADM

## 2024-12-07 RX ADMIN — OXYCODONE HYDROCHLORIDE 5 MG: 5 TABLET ORAL at 00:21

## 2024-12-07 RX ADMIN — ACETAMINOPHEN 1000 MG: 500 TABLET ORAL at 08:39

## 2024-12-07 RX ADMIN — ONDANSETRON 4 MG: 4 TABLET, ORALLY DISINTEGRATING ORAL at 10:06

## 2024-12-07 RX ADMIN — ACETAMINOPHEN 1000 MG: 500 TABLET ORAL at 13:53

## 2024-12-07 RX ADMIN — ONDANSETRON 4 MG: 4 TABLET, ORALLY DISINTEGRATING ORAL at 17:10

## 2024-12-07 RX ADMIN — ONDANSETRON 4 MG: 4 TABLET, ORALLY DISINTEGRATING ORAL at 04:02

## 2024-12-07 RX ADMIN — PROCHLORPERAZINE EDISYLATE 10 MG: 5 INJECTION INTRAMUSCULAR; INTRAVENOUS at 13:46

## 2024-12-07 RX ADMIN — PROCHLORPERAZINE EDISYLATE 10 MG: 5 INJECTION INTRAMUSCULAR; INTRAVENOUS at 19:42

## 2024-12-07 RX ADMIN — PROCHLORPERAZINE EDISYLATE 10 MG: 5 INJECTION INTRAMUSCULAR; INTRAVENOUS at 00:02

## 2024-12-07 RX ADMIN — ENOXAPARIN SODIUM 40 MG: 40 INJECTION SUBCUTANEOUS at 10:07

## 2024-12-07 RX ADMIN — ACETAMINOPHEN 1000 MG: 500 TABLET ORAL at 19:42

## 2024-12-07 RX ADMIN — ACETAMINOPHEN 1000 MG: 500 TABLET ORAL at 04:01

## 2024-12-07 ASSESSMENT — ACTIVITIES OF DAILY LIVING (ADL)
ADLS_ACUITY_SCORE: 33
ADLS_ACUITY_SCORE: 32
ADLS_ACUITY_SCORE: 33
ADLS_ACUITY_SCORE: 32

## 2024-12-07 NOTE — PLAN OF CARE
Goal Outcome Evaluation:    Plan of Care Reviewed With: patient    Overall Patient Progress: improving    Outcome Evaluation: [id number]. VSS on room air. A&Ox4. UAL, steady. Regular diet, poor intake r/t nausea. Pain managed with oxycodone and Tylenol. Nausea managed with Zofran and Compzine; essential oils used adjuvant to medications. LBM last night, hypoactive bowel sounds, passing flatus. Voids spontaneously without difficulty. R-PIV, SL, WNL. Midline abdominal incision and lap sites, JULIUS, WNL.

## 2024-12-07 NOTE — PLAN OF CARE
Goal Outcome Evaluation:        VS: VSS   Neuro: AxOx4  Cardiac: WDL                 Respiratory: WDL  GI/: Voids spontaneously without issue. No BM today.  Pt had an episode of emesis 200 ml several times this shift.  Antiemetics administered with some relief.   Diet/appetite: regular diet, appetite is poor.   Activity: Independent, up ad theresa.   Pain: Pt reports significant pain, managed with prn medications.   Skin/drains: Midline, lap sites.   Lines: PIV.     During an episode of emesis, pt reported shortness of breath and numbness of the hands and feet. Provider was contacted and came to assess at bedside. See provider notification note.

## 2024-12-07 NOTE — PROGRESS NOTES
COLON & RECTAL SURGERY  PROGRESS NOTE  December 7, 2024    39M with crohn's disease sp laparoscopic ileocecectomy and KONO-S anastomosis on 12/5.     SUBJECTIVE:    - Still having intermittent nausea  - Passing small amt gas, little loose bowel movements  - Distended but feels its getting better  - Pain controlled, some soreness with movement    OBJECTIVE:  Temp:  [97.8  F (36.6  C)-99.1  F (37.3  C)] 99.1  F (37.3  C)  Pulse:  [57-68] 60  Resp:  [16-18] 16  BP: (112-132)/(68-83) 112/68  SpO2:  [95 %-98 %] 95 %    Intake/Output Summary (Last 24 hours) at 12/7/2024 0939  Last data filed at 12/6/2024 2357  Gross per 24 hour   Intake 1380 ml   Output 1225 ml   Net 155 ml       GENERAL:  Awake, alert, no acute distress  EXTREMITIES: Warm and well perfused  ABDOMEN:  Soft, appropriately tender, distended. No guarding, rigidity, or peritoneal signs.   INCISION:  C/d/I    ASSESSMENT/PLAN:     Continue general diet   Optimize pain control, muscle relaxer added  Await more robust return of bowel function   Encourage OOB and ambulation   pLovenox

## 2024-12-07 NOTE — PLAN OF CARE
"Goal Outcome Evaluation: [id number]      Plan of Care Reviewed With: patient    Overall Patient Progress: improving    Neuro: A&O x 4.  Respiratory: WDL.  Cardiac: WDL.  GI: Passing gas now. One small, soft BM overnight per pt. Intermittent nausea managed with PRN antiemetics per eMAR; no emesis overnight.  : WDL. Voiding without difficulty.  Lines: PIV x 1, saline locked.  Drains: None.  Skin integrity: Midline incision JULIUS and CDI. Lap sites JULIUS and CDI. No other skin integrity breaches.  Psychosocial: Pt calm and cooperative.  Last vitals: Temp: 99.1  F (37.3  C) Temp src: Oral BP: 112/68 Pulse: 60   Resp: 16 SpO2: 95 % O2 Device: None (Room air)    Pain: Pt had cramping, aching abdominal pain rated between a 3/10 and a 4/10 overnight. He took scheduled Tylenol and one, 5 mg dose of PRN oxycodone tonight. He described his pain management progress as \"doing pretty good.\"  Mobility: Up independently; no cords in the way now.  Will continue to follow POC.  "

## 2024-12-08 ENCOUNTER — APPOINTMENT (OUTPATIENT)
Dept: GENERAL RADIOLOGY | Facility: CLINIC | Age: 39
End: 2024-12-08
Payer: COMMERCIAL

## 2024-12-08 LAB
ANION GAP SERPL CALCULATED.3IONS-SCNC: 14 MMOL/L (ref 7–15)
BUN SERPL-MCNC: 11.4 MG/DL (ref 6–20)
CALCIUM SERPL-MCNC: 9.2 MG/DL (ref 8.8–10.4)
CHLORIDE SERPL-SCNC: 99 MMOL/L (ref 98–107)
CREAT SERPL-MCNC: 0.92 MG/DL (ref 0.67–1.17)
EGFRCR SERPLBLD CKD-EPI 2021: >90 ML/MIN/1.73M2
ERYTHROCYTE [DISTWIDTH] IN BLOOD BY AUTOMATED COUNT: 12.4 % (ref 10–15)
GLUCOSE SERPL-MCNC: 89 MG/DL (ref 70–99)
HCO3 SERPL-SCNC: 23 MMOL/L (ref 22–29)
HCT VFR BLD AUTO: 40.1 % (ref 40–53)
HGB BLD-MCNC: 13.5 G/DL (ref 13.3–17.7)
MAGNESIUM SERPL-MCNC: 1.9 MG/DL (ref 1.7–2.3)
MCH RBC QN AUTO: 29.7 PG (ref 26.5–33)
MCHC RBC AUTO-ENTMCNC: 33.7 G/DL (ref 31.5–36.5)
MCV RBC AUTO: 88 FL (ref 78–100)
PHOSPHATE SERPL-MCNC: 3.3 MG/DL (ref 2.5–4.5)
PLATELET # BLD AUTO: 288 10E3/UL (ref 150–450)
POTASSIUM SERPL-SCNC: 3.5 MMOL/L (ref 3.4–5.3)
RBC # BLD AUTO: 4.55 10E6/UL (ref 4.4–5.9)
SODIUM SERPL-SCNC: 136 MMOL/L (ref 135–145)
WBC # BLD AUTO: 8.3 10E3/UL (ref 4–11)

## 2024-12-08 PROCEDURE — 250N000009 HC RX 250

## 2024-12-08 PROCEDURE — 999N000127 HC STATISTIC PERIPHERAL IV START W US GUIDANCE

## 2024-12-08 PROCEDURE — 250N000011 HC RX IP 250 OP 636

## 2024-12-08 PROCEDURE — 74018 RADEX ABDOMEN 1 VIEW: CPT | Mod: 26 | Performed by: STUDENT IN AN ORGANIZED HEALTH CARE EDUCATION/TRAINING PROGRAM

## 2024-12-08 PROCEDURE — 82310 ASSAY OF CALCIUM: CPT

## 2024-12-08 PROCEDURE — 36415 COLL VENOUS BLD VENIPUNCTURE: CPT

## 2024-12-08 PROCEDURE — 85027 COMPLETE CBC AUTOMATED: CPT

## 2024-12-08 PROCEDURE — 999N000065 XR ABDOMEN PORT 1 VIEW

## 2024-12-08 PROCEDURE — 250N000011 HC RX IP 250 OP 636: Performed by: SURGERY

## 2024-12-08 PROCEDURE — 250N000011 HC RX IP 250 OP 636: Performed by: PHYSICIAN ASSISTANT

## 2024-12-08 PROCEDURE — 84100 ASSAY OF PHOSPHORUS: CPT

## 2024-12-08 PROCEDURE — 74018 RADEX ABDOMEN 1 VIEW: CPT

## 2024-12-08 PROCEDURE — 83735 ASSAY OF MAGNESIUM: CPT

## 2024-12-08 PROCEDURE — 120N000002 HC R&B MED SURG/OB UMMC

## 2024-12-08 PROCEDURE — 250N000013 HC RX MED GY IP 250 OP 250 PS 637: Performed by: SURGERY

## 2024-12-08 PROCEDURE — 82565 ASSAY OF CREATININE: CPT

## 2024-12-08 PROCEDURE — 80048 BASIC METABOLIC PNL TOTAL CA: CPT

## 2024-12-08 RX ORDER — MAGNESIUM SULFATE HEPTAHYDRATE 40 MG/ML
2 INJECTION, SOLUTION INTRAVENOUS ONCE
Status: COMPLETED | OUTPATIENT
Start: 2024-12-08 | End: 2024-12-08

## 2024-12-08 RX ORDER — ONDANSETRON 4 MG/1
4 TABLET, ORALLY DISINTEGRATING ORAL ONCE
Status: COMPLETED | OUTPATIENT
Start: 2024-12-08 | End: 2024-12-08

## 2024-12-08 RX ORDER — POTASSIUM CHLORIDE 750 MG/1
40 TABLET, EXTENDED RELEASE ORAL ONCE
Status: COMPLETED | OUTPATIENT
Start: 2024-12-08 | End: 2024-12-08

## 2024-12-08 RX ORDER — LIDOCAINE HYDROCHLORIDE 20 MG/ML
JELLY TOPICAL ONCE
Status: COMPLETED | OUTPATIENT
Start: 2024-12-08 | End: 2024-12-08

## 2024-12-08 RX ADMIN — PROCHLORPERAZINE EDISYLATE 10 MG: 5 INJECTION INTRAMUSCULAR; INTRAVENOUS at 15:13

## 2024-12-08 RX ADMIN — ONDANSETRON 4 MG: 4 TABLET, ORALLY DISINTEGRATING ORAL at 19:16

## 2024-12-08 RX ADMIN — MAGNESIUM SULFATE HEPTAHYDRATE 2 G: 2 INJECTION, SOLUTION INTRAVENOUS at 08:18

## 2024-12-08 RX ADMIN — ACETAMINOPHEN 1000 MG: 500 TABLET ORAL at 14:05

## 2024-12-08 RX ADMIN — LIDOCAINE HYDROCHLORIDE: 20 JELLY TOPICAL at 19:08

## 2024-12-08 RX ADMIN — ONDANSETRON 4 MG: 4 TABLET, ORALLY DISINTEGRATING ORAL at 01:06

## 2024-12-08 RX ADMIN — ACETAMINOPHEN 1000 MG: 500 TABLET ORAL at 08:15

## 2024-12-08 RX ADMIN — ONDANSETRON 4 MG: 4 TABLET, ORALLY DISINTEGRATING ORAL at 14:05

## 2024-12-08 RX ADMIN — ACETAMINOPHEN 1000 MG: 500 TABLET ORAL at 01:05

## 2024-12-08 RX ADMIN — OXYCODONE HYDROCHLORIDE 5 MG: 5 TABLET ORAL at 13:14

## 2024-12-08 RX ADMIN — PROCHLORPERAZINE EDISYLATE 10 MG: 5 INJECTION INTRAMUSCULAR; INTRAVENOUS at 04:41

## 2024-12-08 RX ADMIN — ONDANSETRON 4 MG: 4 TABLET, ORALLY DISINTEGRATING ORAL at 08:16

## 2024-12-08 RX ADMIN — ENOXAPARIN SODIUM 40 MG: 40 INJECTION SUBCUTANEOUS at 10:38

## 2024-12-08 RX ADMIN — POTASSIUM CHLORIDE 40 MEQ: 750 TABLET, EXTENDED RELEASE ORAL at 08:16

## 2024-12-08 ASSESSMENT — ACTIVITIES OF DAILY LIVING (ADL)
ADLS_ACUITY_SCORE: 32

## 2024-12-08 NOTE — PLAN OF CARE
"Goal Outcome Evaluation: [id number]    Neuro: A&O x 4.  Respiratory: WDL.  Cardiac: WDL.  GI: Pt denied nausea overnight. PRN Zofran and PRN Compazine given for nausea prevention. No BM overnight. Still passing flatus.  : WDL.  Lines: PIV x 1. Flushed, saline locked.  Drains: None.  Skin integrity: No new skin issues. Midline incision JULIUS with no drainage. Lap sites JULIUS with no drainage.  Psychosocial: Pt pleasant and cooperative.  Last vitals: Temp: 98.5  F (36.9  C) Temp src: Oral BP: 118/71 Pulse: 60   Resp: 16 SpO2: 96 % O2 Device: None (Room air)    Pain: Pt reports ongoing abdominal pain, max level overnight was 4/10. Pt took scheduled Tylenol; when asked about trying any other interventions, pt said \"no, this is pretty good for me.\" Pt continued to rest overnight.  Mobility: Up ad theresa.  Will continue to follow POC.  "

## 2024-12-08 NOTE — PROVIDER NOTIFICATION
Provider notification    Notified Provider Doretha Sandrario :    Karlos Terrazas 5224, 5A. Pt is having significant continuous nausea, with some relief from antiemetics. The antiemetics ordered are not enough to keep his nausea controlled. Could we get an order for ativan and change his prn nausea meds from prn 6 hrs to prn 4 hrs? Danielle [phone]    Response: No response   Additional Notes: Mild and intermittent pruritus sans rash L lat thigh.  I wonder if it is neuropathic or MSK in origin because patient seems to have a tight IT band? He should monitor and use OTC HC cream or DERMEND lotion and RTC if persistent or worsening. Detail Level: Detailed

## 2024-12-08 NOTE — PROGRESS NOTES
COLON & RECTAL SURGERY  PROGRESS NOTE  December 7, 2024    39M with crohn's disease sp laparoscopic ileocecectomy and KONO-S anastomosis on 12/5.     SUBJECTIVE:    - Still having intermittent nausea but is better today, no vomiting.   - Passing small amt gas, little loose bowel movements x4  - Less distended, walking the hallways  - Pain controlled, some soreness with movement    OBJECTIVE:  Temp:  [98.1  F (36.7  C)-99.8  F (37.7  C)] 98.5  F (36.9  C)  Pulse:  [60-77] 60  Resp:  [16-18] 16  BP: (113-125)/(71-86) 118/71  SpO2:  [96 %-99 %] 96 %    Intake/Output Summary (Last 24 hours) at 12/7/2024 0939  Last data filed at 12/6/2024 2357  Gross per 24 hour   Intake 1380 ml   Output 1225 ml   Net 155 ml       GENERAL:  Awake, alert, no acute distress  EXTREMITIES: Warm and well perfused  ABDOMEN:  Soft, appropriately tender, distended. No guarding, rigidity, or peritoneal signs.   INCISION:  C/d/I    ASSESSMENT/PLAN:     Continue general diet   Optimize pain control, muscle relaxer added  Await more robust return of bowel function   Encourage OOB and ambulation   pLovenox, have been getting lovenox administration teaching  6. K and mag replete

## 2024-12-08 NOTE — PLAN OF CARE
Goal Outcome Evaluation:    Plan of Care Reviewed With: patient    Overall Patient Progress: improving    Outcome Evaluation: 5400-3150. VSS on room air. A&Ox4. UAL, steady. Regular diet, poor intake r/t nausea. Pain managed with oxycodone and Tylenol. Nausea managed with Zofran and Compazine; aromatherapy offered. LBM overnight, hypoactive bowel sounds, passing flatus. Voids spontaneously without difficulty. R-PIV removed r/t phlebitis. Midline abdominal incision and lap sites, JULIUS, WNL.

## 2024-12-08 NOTE — PLAN OF CARE
Goal Outcome Evaluation:      VS: VSS   Neuro: AxOx4  Cardiac: WDL                 Respiratory: WDL  GI/: Voids spontaneously without issue. One BM today, pt is passing gas.   Diet/appetite: Regular diet, pt appetite is very poor due to nausea. Antiemetics administered with some relief.   Activity: UAL  Pain: Pt reports moderate pain, managed with medications per the mar.   Skin/drains: Midline incision, lap sites x2                                                                                Lines:

## 2024-12-09 VITALS
TEMPERATURE: 97.6 F | BODY MASS INDEX: 19.16 KG/M2 | HEIGHT: 72 IN | OXYGEN SATURATION: 98 % | HEART RATE: 78 BPM | RESPIRATION RATE: 18 BRPM | DIASTOLIC BLOOD PRESSURE: 78 MMHG | WEIGHT: 141.5 LBS | SYSTOLIC BLOOD PRESSURE: 125 MMHG

## 2024-12-09 LAB
ANION GAP SERPL CALCULATED.3IONS-SCNC: 17 MMOL/L (ref 7–15)
BUN SERPL-MCNC: 15.2 MG/DL (ref 6–20)
CALCIUM SERPL-MCNC: 9.3 MG/DL (ref 8.8–10.4)
CHLORIDE SERPL-SCNC: 98 MMOL/L (ref 98–107)
CREAT SERPL-MCNC: 0.89 MG/DL (ref 0.67–1.17)
EGFRCR SERPLBLD CKD-EPI 2021: >90 ML/MIN/1.73M2
ERYTHROCYTE [DISTWIDTH] IN BLOOD BY AUTOMATED COUNT: 12.2 % (ref 10–15)
GLUCOSE SERPL-MCNC: 88 MG/DL (ref 70–99)
HCO3 SERPL-SCNC: 23 MMOL/L (ref 22–29)
HCT VFR BLD AUTO: 40.7 % (ref 40–53)
HGB BLD-MCNC: 13.7 G/DL (ref 13.3–17.7)
MAGNESIUM SERPL-MCNC: 2 MG/DL (ref 1.7–2.3)
MCH RBC QN AUTO: 29.5 PG (ref 26.5–33)
MCHC RBC AUTO-ENTMCNC: 33.7 G/DL (ref 31.5–36.5)
MCV RBC AUTO: 88 FL (ref 78–100)
PHOSPHATE SERPL-MCNC: 3.3 MG/DL (ref 2.5–4.5)
PLATELET # BLD AUTO: 314 10E3/UL (ref 150–450)
POTASSIUM SERPL-SCNC: 3.6 MMOL/L (ref 3.4–5.3)
RBC # BLD AUTO: 4.64 10E6/UL (ref 4.4–5.9)
SODIUM SERPL-SCNC: 138 MMOL/L (ref 135–145)
WBC # BLD AUTO: 10 10E3/UL (ref 4–11)

## 2024-12-09 PROCEDURE — 83735 ASSAY OF MAGNESIUM: CPT

## 2024-12-09 PROCEDURE — 250N000011 HC RX IP 250 OP 636: Performed by: SURGERY

## 2024-12-09 PROCEDURE — 250N000013 HC RX MED GY IP 250 OP 250 PS 637: Performed by: SURGERY

## 2024-12-09 PROCEDURE — 82310 ASSAY OF CALCIUM: CPT

## 2024-12-09 PROCEDURE — 85014 HEMATOCRIT: CPT

## 2024-12-09 PROCEDURE — 36415 COLL VENOUS BLD VENIPUNCTURE: CPT

## 2024-12-09 PROCEDURE — 250N000011 HC RX IP 250 OP 636: Performed by: PHYSICIAN ASSISTANT

## 2024-12-09 PROCEDURE — 84100 ASSAY OF PHOSPHORUS: CPT

## 2024-12-09 PROCEDURE — 80048 BASIC METABOLIC PNL TOTAL CA: CPT

## 2024-12-09 RX ORDER — ONDANSETRON 4 MG/1
4 TABLET, FILM COATED ORAL EVERY 6 HOURS PRN
Qty: 5 TABLET | Refills: 0 | Status: SHIPPED | OUTPATIENT
Start: 2024-12-09

## 2024-12-09 RX ORDER — POTASSIUM CHLORIDE 750 MG/1
40 TABLET, EXTENDED RELEASE ORAL ONCE
Status: COMPLETED | OUTPATIENT
Start: 2024-12-09 | End: 2024-12-09

## 2024-12-09 RX ADMIN — ACETAMINOPHEN 1000 MG: 500 TABLET ORAL at 09:07

## 2024-12-09 RX ADMIN — ACETAMINOPHEN 1000 MG: 500 TABLET ORAL at 03:49

## 2024-12-09 RX ADMIN — ONDANSETRON 4 MG: 4 TABLET, ORALLY DISINTEGRATING ORAL at 03:49

## 2024-12-09 RX ADMIN — ENOXAPARIN SODIUM 40 MG: 40 INJECTION SUBCUTANEOUS at 11:54

## 2024-12-09 RX ADMIN — POTASSIUM CHLORIDE 40 MEQ: 750 TABLET, EXTENDED RELEASE ORAL at 13:10

## 2024-12-09 RX ADMIN — ACETAMINOPHEN 1000 MG: 500 TABLET ORAL at 14:58

## 2024-12-09 ASSESSMENT — ACTIVITIES OF DAILY LIVING (ADL)
ADLS_ACUITY_SCORE: 32

## 2024-12-09 NOTE — PLAN OF CARE
"Goal Outcome Evaluation: 4672-9836      Plan of Care Reviewed With: patient    Overall Patient Progress: no change    Neuro: A&O x 4, using call light appropriately.  Respiratory: WDL.  Cardiac: WDL.  GI: NG tube preventing pt from sleeping and causing strong distress. Paged provider, ended up removing around midnight. Pt stated he felt \"immediately better\" and went to sleep. Denied nausea overnight. Agreed to one dose of PRN Zofran for nausea prevention. Remains NPO. Expressed desire to advance diet. No BM overnight.  : WDL.  Lines: PIV x 1, saline locked.  Drains: None.  Skin integrity: Midline incision JULIUS and CDI. Lap sites x 2 JULIUS and CDI. No new skin issues.  Psychosocial: Pt pleasant, cooperative.  Last vitals: Temp: 98.5  F (36.9  C) Temp src: Oral BP: 122/78 Pulse: 78   Resp: 16 SpO2: 97 % O2 Device: None (Room air)     Pain: Pt endorses abdominal pain rated 2/10 overnight. Pain controlled with scheduled Tylenol.  Mobility: Up ad theresa.  Will continue to follow POC.  "

## 2024-12-09 NOTE — PLAN OF CARE
Goal Outcome Evaluation:      Plan of Care Reviewed With: patient    Overall Patient Progress: improving    Outcome Evaluation: Diet advanced to regular. Pain/nausea improving    VSS, afebrile. Continues w/ minimal abdominal pain - managed w/ scheduled tylenol. One time dose of 40mEq potassium given. Ambulating halls. Self administering lovenox inj. Pending PO intake w/o symptoms, potential discharge this evening/tomorrow (12/10). Continue to monitor & w/ POC.      Discharge  D: Orders for discharge and outpatient medications written.  I: Home medications and return to clinic schedule reviewed with patient. Discharge instructions and parameters for calling Health Care Provider reviewed. Patient left around 1545 accompanied by family.   A: Patient/family verbalized understanding and was ready for discharge.   P: Patient instructed to  medications in Pharmacy. Follow up as scheduled w/ outpatient teams.        PIV removed. Discharge paperwork discussed & all questions answered. Pt left w/ all belongings.

## 2024-12-09 NOTE — PROVIDER NOTIFICATION
Provider notification;    Doctor Natalia Ann notified at 0235 [address]oc[address] - Call.    Reason for notification: Patient reports extreme discomfort with NG tube and requesting removal. OK to remove?    Plan: If pt not receptive to other options such as throat spray, OK to remove NG tube per MD.

## 2024-12-09 NOTE — PROGRESS NOTES
COLON & RECTAL SURGERY  PROGRESS NOTE  12/9/2024      39M with crohn's disease sp laparoscopic ileocecectomy and KONO-S anastomosis on 12/5.     SUBJECTIVE:    - Still having intermittent nausea but is better today, no vomiting.   - had a NGT placed but was cause him too much distress so it was removed per patient request  - Passing small amt gas and having BM  - Less distended, walking the hallways  - Pain controlled, some soreness with movement    OBJECTIVE:  Temp:  [97.8  F (36.6  C)-98.9  F (37.2  C)] 98.1  F (36.7  C)  Pulse:  [68-92] 78  Resp:  [16-18] 18  BP: (110-139)/(75-90) 114/75  SpO2:  [97 %-99 %] 98 %    Intake/Output Summary (Last 24 hours) at 12/7/2024 0939  Last data filed at 12/6/2024 2357  Gross per 24 hour   Intake 1380 ml   Output 1225 ml   Net 155 ml       GENERAL:  Awake, alert, no acute distress  EXTREMITIES: Warm and well perfused  ABDOMEN:  Soft, appropriately tender, distended. No guarding, rigidity, or peritoneal signs.   INCISION:  C/d/I    ASSESSMENT/PLAN:     Start regular diet  Optimize pain control, muscle relaxer added  Encourage OOB and ambulation   pLovenox, have been getting lovenox administration teaching  5.  K replete  6. Possible discharge later this today if tolerates diet

## 2024-12-09 NOTE — PLAN OF CARE
Goal Outcome Evaluation:      VS: VSS   Neuro: AxOx4  Cardiac: WDL                 Respiratory:  WDL  GI/: Voids spontaneously without issue.  Nasogastric tube in place, to intermittent suction. Pt had one bowel movement earlier in the day. Pt experiencing continuous nausea, managed with prn antiemetics.   Diet/appetite: Pt NPO, appetite is poor.   Activity: UAL  Pain: Pt reports moderate pain, managed with PRN medications per the mar.   Skin/drains: Midline incision, lap sitesx2  Lines: PIV SL

## 2024-12-10 ENCOUNTER — PATIENT OUTREACH (OUTPATIENT)
Dept: FAMILY MEDICINE | Facility: CLINIC | Age: 39
End: 2024-12-10
Payer: COMMERCIAL

## 2024-12-10 NOTE — TELEPHONE ENCOUNTER
Transitions of Care Outreach  Chief Complaint   Patient presents with    Hospital F/U       Most Recent Admission Date: 12/5/2024   Most Recent Admission Diagnosis: Crohn's disease of small intestine without complication (H) - K50.00     Most Recent Discharge Date: 12/9/2024   Most Recent Discharge Diagnosis: Crohn's disease of small intestine without complication (H) - K50.00  Crohn's disease with complication, unspecified gastrointestinal tract location (H) - K50.919  Acute post-operative pain - G89.18  Postoperative nausea - R11.0, Z98.890     Transitions of Care Assessment    Discharge Assessment  How are you doing now that you are home?: Doing very well, no nausea  How are your symptoms? (Red Flag symptoms escalate to triage hotline per guidelines): Improved  Do you know how to contact your clinic care team if you have future questions or changes to your health status? : Yes  Does the patient have their discharge instructions? : Yes  Does the patient have questions regarding their discharge instructions? : Yes (see comment)  Were you started on any new medications or were there changes to any of your previous medications? : Yes  Does the patient have all of their medications?: Yes  Do you have questions regarding any of your medications? : No    Follow up Plan     Discharge Follow-Up  Discharge follow up appointment scheduled in alignment with recommended follow up timeframe or Transitions of Risk Category? (Low = within 30 days; Moderate= within 14 days; High= within 7 days): Yes  Discharge Follow Up Appointment Date: 12/19/24  Discharge Follow Up Appointment Scheduled with?: Specialty Care Provider    Future Appointments   Date Time Provider Department Center   12/19/2024  1:00 PM Irina Box APRN [facility]   1/14/2025  1:30 PM Talat Osorio MD [facility]   1/27/2025  9:10 AM [facility]   2/6/2025  2:20 PM Lazaro Leo MD [facility]    3/6/2025  2:45 PM Jesse Smith MD [facility]       Outpatient Plan as outlined on AVS reviewed with patient.    For any urgent concerns, please contact our 24 hour nurse triage line: [phone] ([phone])       Panfilo Sousa RN

## 2024-12-11 ENCOUNTER — TELEPHONE (OUTPATIENT)
Dept: SURGERY | Facility: CLINIC | Age: 39
End: 2024-12-11
Payer: COMMERCIAL

## 2024-12-11 NOTE — CONFIDENTIAL NOTE
Post Op Note    Called to check on patient postoperatively after hospital discharge.       Patient is s/p lap ileocecetomy with Dr. Talat Osorio.   Admitted 12/5 and discharged on 12/11.    Pain is well controlled with 5mg oxy 18tabs   Patient is eating and drinking normally. Patient is on a low residue diet.  Encouraged patient to drink 8-10 glasses of water a day.   Patient is passing flats, is having watery and frequent stools.   Patient does not require supplies.   Patient is voiding normally and urine is light in color.    Patient's pathology was not reviewed with them.   Patient Denies nausea and vomiting.  Patient Denies any fevers or chills.  Patient's incision is CDI. Patient reminded NOT to remove any dressings over their incisions.   Patient is on a activity restriction. Lifting 10 pounds for 6 weeks.   Patient Denies needing any forms completed.   Follow up is set up with Irina Box NP on 12/19 and with Dr. Talat Osorio on 1/14.   Encouraged the patient to contact the clinic in the meantime with any fevers, chills, nausea, vomiting, increased colostomy output, no colostomy output, dizziness, lightheadedness, uncontrolled pain, changes to the incisions, or with any questions or concerns.  Lovenox shot, stop infliximab for 4 weeks after discharge    Patient's questions were answered to their stated satisfaction and they are in agreement with this plan.    NEIL Holt [phone]  Colon & Rectal Surgery Clinic  [facility]

## 2024-12-19 ENCOUNTER — OFFICE VISIT (OUTPATIENT)
Dept: SURGERY | Facility: CLINIC | Age: 39
End: 2024-12-19
Payer: COMMERCIAL

## 2024-12-19 VITALS — DIASTOLIC BLOOD PRESSURE: 76 MMHG | HEART RATE: 65 BPM | OXYGEN SATURATION: 98 % | SYSTOLIC BLOOD PRESSURE: 114 MMHG

## 2024-12-19 DIAGNOSIS — Z09 FOLLOW-UP EXAMINATION AFTER COLORECTAL SURGERY: Primary | ICD-10-CM

## 2024-12-19 ASSESSMENT — PAIN SCALES - GENERAL: PAINLEVEL_OUTOF10: NO PAIN (0)

## 2024-12-19 NOTE — LETTER
2024       RE: Karlos Terrazas  [address] [place] [address]     Dear Colleague,    Thank you for referring your patient, Karlos Terrazas, to the [facility] COL[facility] AND RECTAL SURGERY CLINIC [place] at [facility]. Please see a copy of my visit note below.    Colon and Rectal Surgery Postoperative Clinic Note    RE: Karlos Terrazas  : 1985  TIMMY: 2024    Karlos Terrazas is a very pleasant 39 year old male with Crohn's disease at the  now status post laparoscopic right colectomy with KONO-S anastomosis on 24 by Dr. Osorio.     Final Diagnosis   A. RIGHT COLON, TERMINAL ILEUM AND APPENDIX, LAPAROSCOPIC ILEOCECECTOMY:  -Patchy chronic active enteritis (focally ulcerated) with transmural inflammation involving the terminal ileum and cecum compatible with Crohn's disease  -Inflammatory polyps in the terminal ileum and cecum  -Appendix with fibrous obliteration of the lumen  -Viable proximal and distal surgical resection margins  -Six benign lymph nodes (0/6)  -Negative for dysplasia and malignancy     Interval history: Karlos has been doing well. Minimal pain. No nausea or vomiting. Is having about 5 bowel movements a day that are getting a little form in the morning. Tolerating a regular diet. No fevers or chills.     Physical Examination: Exam was chaperoned by REVA Mcdonald   /76 (BP Location: Left arm, Patient Position: Sitting, Cuff Size: Adult Regular)   Pulse 65   SpO2 98%   General: alert, oriented, in no acute distress, sitting comfortably  HEENT: mucous membranes moist  Abdomen: incision margins well approximated without erythema or drainage.    Assessment/Plan:  39 year old male status post laparoscopic right colectomy with KONO-S anastomosis on 24 by Dr. Osorio. He is recovering well. No lifting more than 10 pounds for a full 6 weeks from surgery. Okay to start infliximab next week per Dr. Osorio.  Follow up next month with Dr. Osorio but encouraged him to contact the clinic in the meantime with any questions or concerns. Patient's questions were answered to his stated satisfaction and he is in agreement with this plan.     Medical history:  Past Medical History:   Diagnosis Date     Anemia 2/1/2023    Crohn's disease     Crohn's disease of small intestine without complication (H)        Surgical history:  Past Surgical History:   Procedure Laterality Date     COLONOSCOPY N/A 04/07/2023    Procedure: COLONOSCOPY, WITH BIOPSY;  Surgeon: Lamont Seaman MD;  Location: [facility] OR     LAPAROSCOPIC APPENDECTOMY N/A 02/22/2023    STILL HAS APPENDIX - actually had a diagnostic laparoscopy and Crohn's was diagnosed     LAPAROSCOPIC RESECTION ILEOCOLIC N/A 12/5/2024    Procedure: Laparoscopic ileocecectomy with KONO-S anastomosis;  Surgeon: Talat Osorio MD;  Location: [facility] OR     [facility]  1/1/2019       Problem list:    Patient Active Problem List    Diagnosis Date Noted     Crohn disease (H) 12/05/2024     Priority: Medium     Anemia, iron deficiency 08/10/2023     Priority: Medium     Crohn's disease (H) 05/10/2023     Priority: Medium       Medications:  Current Outpatient Medications   Medication Sig Dispense Refill     acetaminophen (TYLENOL) 500 MG tablet Take 2 tablets (1,000 mg) by mouth every 6 hours. 100 tablet 0     CALCIUM-VITAMIN D PO Take 1 tablet by mouth daily       carboxymethylcellulose PF (CARBOXYMETHYLCELLULOSE SODIUM) 0.5 % ophthalmic solution Place 1 drop into both eyes 4 times daily Preservative free artificial tears, single use vials. 400 each 11     diphenhydrAMINE (BENADRYL) 50 MG/ML injection Inject 1 mL (50 mg) over 3-5 minutes into the vein via push as needed for other (infusion reaction). For RN use only.  Draw up in a syringe and administer IV push.  Discard remainder of vial. 82341 mL 0     Emergency Supply Kit, PIV, Patient use for emergency only. Contents: 3 sodium chloride 0.9%  "flushes, 1 IV start kit, 1 microclave ext set 14\", 1 each IV Cath 22 G/1\" and 24G/3/4\", 6 alcohol prep pads, 4 nitrile gloves (med). Call [phone] to reorder. [id number] kit 0     enoxaparin ANTICOAGULANT (LOVENOX) 40 MG/0.4ML syringe Inject 0.4 mLs (40 mg) subcutaneously every 24 hours. 12 mL 0     EPINEPHrine (ANY BX GENERIC EQUIV) 0.3 MG/0.3ML injection 2-pack Inject 0.3 mLs (0.3 mg) into the muscle as needed for anaphylaxis (infusion reaction). Administer into the mid-thigh in case of severe anaphylaxis (wheezing, throat tightening, mouth swelling, difficulty breathing). May repeat dose one time in 5-15 minutes if symptoms persist. 268989 mL 0     FERROUS BISGLYCINATE CHELATE PO Take 1 tablet by mouth three times a week       inFLIXimab (REMICADE) 100 MG injection Add to infusion 70 mLs (700 mg) once every six weeks. Reconstitute infliximab vial(s).  Draw up infliximab 10 mg/mL in syringe with 21 G needle and add to NaCl 0.9% bag immediately prior to infusing.  MIX GENTLY BY INVERSION, DO NOT SHAKE. Discard remainder of vial(s). 25 each 0     inFLIXimab (REMICADE) 100 MG injection Add to infusion 70 mLs (700 mg) once every six weeks. PLEASE WAIT 4 WEEKS AFTER SURGERY BEFORE RESTARTING REMICADE.       Reconstitute infliximab vial(s).  Draw up infliximab 10 mg/mL in syringe with 21 G needle and add to NaCl 0.9% bag immediately prior to infusing.  MIX GENTLY BY INVERSION, DO NOT SHAKE. Discard remainder of vial(s).       inFLIXimab, REMICADE, 100 MG injection Inject 699 mg into the vein once every six weeks. PLEASE WAIT 4 WEEKS AFTER SURGERY BEFORE RESUMING REMICADE       methylPREDNISolone Na Suc, PF, (SOLU-MEDROL) 125 mg/2 mL injection Inject 2 mLs (125 mg) over 3-5 minutes into the vein via push as needed (infusion reaction). For RN use only.  Reconstitute vial. Draw up methylPREDNISolone in a syringe and administer.  Discard remainder of vial. 343273 mL 0     ondansetron (ZOFRAN) 4 MG tablet Take 1 tablet (4 " mg) by mouth every 6 hours as needed for nausea. 5 tablet 0     sodium chloride 0.9% bag Infuse 250 mLs over 1 hours into the vein once every six weeks. Add 70 mL (700 mg) of infliximab 10 mg/mL to bag immediately prior to infusing via gravity infusion. When complete, flush bag with 20 mL NS to flush tubing. 956343 mL 0     sodium chloride, PF, 0.9% PF flush Inject 10 mLs into the vein as needed for line flush. Flush IV before and after medication administration as directed and/or at least every 12 hours. 872846 mL 0     sterile water, preservative free, injection Use 70 mLs for reconstitution once every six weeks. 1. Reconstitute each vial of infliximab with 10 mL of sterile water for injection by slowly injecting 10 mL sterile water down the inside wall of vial w/21 G needle. DO NOT SHAKE. Foaming of the solution on reconstitution is not unusual. Roll and tilt each vial gently.  2. Let drug stand for 5 minutes. 3. Inspect vials for particules and/or discoloration prior to continuing. The solution should be colorless to light yellow and opalescent, and may develop a few translucent particles. DO NOT USE IF DRUG HAS NOT FULLY DISSOLVED OR IF OPAQUE PARTICLES, DISCOLORATION OR OTHER FOREIGN PARTICULES ARE PRESENT. 4. Draw up appropriate dose w/ 21 G needle from vial. 280 mL 0     oxyCODONE (ROXICODONE) 5 MG tablet Take 1-2 tablets (5-10 mg) by mouth every 6 hours as needed for moderate to severe pain. (Patient not taking: Reported on 12/19/2024) 18 tablet 0     sodium chloride 0.9% infusion Infuse 500 mLs into the vein as needed for other (infusion reaction). In case of mild reaction, administer via gravity at 20 mL/hr to keep vein open. In case of severe reaction, administer via gravity wide open on prime setting. (Patient not taking: Reported on 12/19/2024) 814918 mL 0     sodium chloride, PF, 0.9% PF flush Inject 10 mLs into the vein as needed for other (infusion reaction). For RN use only as needed for infusion  reaction (Patient not taking: Reported on 12/19/2024) 551756 mL 0       Allergies:  Allergies   Allergen Reactions     Other Food Allergy Angioedema     carrots       Family history:  Family History   Problem Relation Age of Onset     Melanoma Mother      Skin Cancer Mother      Hyperlipidemia Mother      Thyroid Disease Mother      Anesthesia Reaction Mother         PONV     Skin Cancer Father         SCC and BCC     Skin Cancer Maternal Grandmother      Deep Vein Thrombosis (DVT) Maternal Grandmother      Melanoma Paternal Grandmother      Colon Cancer No family hx of        Social history:  Social History     Tobacco Use     Smoking status: Never     Smokeless tobacco: Never   Substance Use Topics     Alcohol use: Not Currently     Comment: 0 drinks per week     Marital status: .    Nursing Notes:   Danny Parks EMT  12/19/2024 12:38 PM  Signed  Chief Complaint   Patient presents with     Post-op Visit       Vitals:    12/19/24 1236   BP: 114/76   BP Location: Left arm   Patient Position: Sitting   Cuff Size: Adult Regular   Pulse: 65   SpO2: 98%       There is no height or weight on file to calculate BMI.    Danny Parks EMT-P       20 minutes spent on the date of the encounter doing chart review, history and exam, documentation and further activities as noted above.   This is a postop visit.    LUCAS Saucedo, NP-C  Colon and Rectal Surgery  [facility]    This note was created using speech recognition software and may contain unintended word substitutions.        Again, thank you for allowing me to participate in the care of your patient.      Sincerely,    LUCAS Saucedo CNP

## 2024-12-19 NOTE — PROGRESS NOTES
Colon and Rectal Surgery Postoperative Clinic Note    RE: Karlos Terrazas  : 1985  TIMMY: 2024    Karlos Terrazas is a very pleasant 39 year old male with Crohn's disease at the  now status post laparoscopic right colectomy with KONO-S anastomosis on 24 by Dr. Osorio.     Final Diagnosis   A. RIGHT COLON, TERMINAL ILEUM AND APPENDIX, LAPAROSCOPIC ILEOCECECTOMY:  -Patchy chronic active enteritis (focally ulcerated) with transmural inflammation involving the terminal ileum and cecum compatible with Crohn's disease  -Inflammatory polyps in the terminal ileum and cecum  -Appendix with fibrous obliteration of the lumen  -Viable proximal and distal surgical resection margins  -Six benign lymph nodes (0/6)  -Negative for dysplasia and malignancy     Interval history: Karlos has been doing well. Minimal pain. No nausea or vomiting. Is having about 5 bowel movements a day that are getting a little form in the morning. Tolerating a regular diet. No fevers or chills.     Physical Examination: Exam was chaperoned by Gwen Sheikh EMT   /76 (BP Location: Left arm, Patient Position: Sitting, Cuff Size: Adult Regular)   Pulse 65   SpO2 98%   General: alert, oriented, in no acute distress, sitting comfortably  HEENT: mucous membranes moist  Abdomen: incision margins well approximated without erythema or drainage.    Assessment/Plan:  39 year old male status post laparoscopic right colectomy with KONO-S anastomosis on 24 by Dr. Osorio. He is recovering well. No lifting more than 10 pounds for a full 6 weeks from surgery. Okay to start infliximab next week per Dr. Osorio. Follow up next month with Dr. Osorio but encouraged him to contact the clinic in the meantime with any questions or concerns. Patient's questions were answered to his stated satisfaction and he is in agreement with this plan.     Medical history:  Past Medical History:   Diagnosis Date    Anemia 2023    Crohn's disease     "Crohn's disease of small intestine without complication (H)        Surgical history:  Past Surgical History:   Procedure Laterality Date    COLONOSCOPY N/A 04/07/2023    Procedure: COLONOSCOPY, WITH BIOPSY;  Surgeon: Lamont Seaman MD;  Location: [facility] OR    LAPAROSCOPIC APPENDECTOMY N/A 02/22/2023    STILL HAS APPENDIX - actually had a diagnostic laparoscopy and Crohn's was diagnosed    LAPAROSCOPIC RESECTION ILEOCOLIC N/A 12/5/2024    Procedure: Laparoscopic ileocecectomy with KONO-S anastomosis;  Surgeon: Talat Osorio MD;  Location: [facility] OR    LASIK  1/1/2019       Problem list:    Patient Active Problem List    Diagnosis Date Noted    Crohn disease (H) 12/05/2024     Priority: Medium    Anemia, iron deficiency 08/10/2023     Priority: Medium    Crohn's disease (H) 05/10/2023     Priority: Medium       Medications:  Current Outpatient Medications   Medication Sig Dispense Refill    acetaminophen (TYLENOL) 500 MG tablet Take 2 tablets (1,000 mg) by mouth every 6 hours. 100 tablet 0    CALCIUM-VITAMIN D PO Take 1 tablet by mouth daily      carboxymethylcellulose PF (CARBOXYMETHYLCELLULOSE SODIUM) 0.5 % ophthalmic solution Place 1 drop into both eyes 4 times daily Preservative free artificial tears, single use vials. 400 each 11    diphenhydrAMINE (BENADRYL) 50 MG/ML injection Inject 1 mL (50 mg) over 3-5 minutes into the vein via push as needed for other (infusion reaction). For RN use only.  Draw up in a syringe and administer IV push.  Discard remainder of vial. 02912 mL 0    Emergency Supply Kit, PIV, Patient use for emergency only. Contents: 3 sodium chloride 0.9% flushes, 1 IV start kit, 1 microclave ext set 14\", 1 each IV Cath 22 G/1\" and 24G/3/4\", 6 alcohol prep pads, 4 nitrile gloves (med). Call [phone] to reorder. 935335 kit 0    enoxaparin ANTICOAGULANT (LOVENOX) 40 MG/0.4ML syringe Inject 0.4 mLs (40 mg) subcutaneously every 24 hours. 12 mL 0    EPINEPHrine (ANY BX GENERIC EQUIV) 0.3 " MG/0.3ML injection 2-pack Inject 0.3 mLs (0.3 mg) into the muscle as needed for anaphylaxis (infusion reaction). Administer into the mid-thigh in case of severe anaphylaxis (wheezing, throat tightening, mouth swelling, difficulty breathing). May repeat dose one time in 5-15 minutes if symptoms persist. 515878 mL 0    FERROUS BISGLYCINATE CHELATE PO Take 1 tablet by mouth three times a week      inFLIXimab (REMICADE) 100 MG injection Add to infusion 70 mLs (700 mg) once every six weeks. Reconstitute infliximab vial(s).  Draw up infliximab 10 mg/mL in syringe with 21 G needle and add to NaCl 0.9% bag immediately prior to infusing.  MIX GENTLY BY INVERSION, DO NOT SHAKE. Discard remainder of vial(s). 25 each 0    inFLIXimab (REMICADE) 100 MG injection Add to infusion 70 mLs (700 mg) once every six weeks. PLEASE WAIT 4 WEEKS AFTER SURGERY BEFORE RESTARTING REMICADE.       Reconstitute infliximab vial(s).  Draw up infliximab 10 mg/mL in syringe with 21 G needle and add to NaCl 0.9% bag immediately prior to infusing.  MIX GENTLY BY INVERSION, DO NOT SHAKE. Discard remainder of vial(s).      inFLIXimab, REMICADE, 100 MG injection Inject 699 mg into the vein once every six weeks. PLEASE WAIT 4 WEEKS AFTER SURGERY BEFORE RESUMING REMICADE      methylPREDNISolone Na Suc, PF, (SOLU-MEDROL) 125 mg/2 mL injection Inject 2 mLs (125 mg) over 3-5 minutes into the vein via push as needed (infusion reaction). For RN use only.  Reconstitute vial. Draw up methylPREDNISolone in a syringe and administer.  Discard remainder of vial. 481875 mL 0    ondansetron (ZOFRAN) 4 MG tablet Take 1 tablet (4 mg) by mouth every 6 hours as needed for nausea. 5 tablet 0    sodium chloride 0.9% bag Infuse 250 mLs over 1 hours into the vein once every six weeks. Add 70 mL (700 mg) of infliximab 10 mg/mL to bag immediately prior to infusing via gravity infusion. When complete, flush bag with 20 mL NS to flush tubing. 417632 mL 0    sodium chloride, PF,  0.9% PF flush Inject 10 mLs into the vein as needed for line flush. Flush IV before and after medication administration as directed and/or at least every 12 hours. 088830 mL 0    sterile water, preservative free, injection Use 70 mLs for reconstitution once every six weeks. 1. Reconstitute each vial of infliximab with 10 mL of sterile water for injection by slowly injecting 10 mL sterile water down the inside wall of vial w/21 G needle. DO NOT SHAKE. Foaming of the solution on reconstitution is not unusual. Roll and tilt each vial gently.  2. Let drug stand for 5 minutes. 3. Inspect vials for particules and/or discoloration prior to continuing. The solution should be colorless to light yellow and opalescent, and may develop a few translucent particles. DO NOT USE IF DRUG HAS NOT FULLY DISSOLVED OR IF OPAQUE PARTICLES, DISCOLORATION OR OTHER FOREIGN PARTICULES ARE PRESENT. 4. Draw up appropriate dose w/ 21 G needle from vial. 280 mL 0    oxyCODONE (ROXICODONE) 5 MG tablet Take 1-2 tablets (5-10 mg) by mouth every 6 hours as needed for moderate to severe pain. (Patient not taking: Reported on 12/19/2024) 18 tablet 0    sodium chloride 0.9% infusion Infuse 500 mLs into the vein as needed for other (infusion reaction). In case of mild reaction, administer via gravity at 20 mL/hr to keep vein open. In case of severe reaction, administer via gravity wide open on prime setting. (Patient not taking: Reported on 12/19/2024) 202749 mL 0    sodium chloride, PF, 0.9% PF flush Inject 10 mLs into the vein as needed for other (infusion reaction). For RN use only as needed for infusion reaction (Patient not taking: Reported on 12/19/2024) 387533 mL 0       Allergies:  Allergies   Allergen Reactions    Other Food Allergy Angioedema     carrots       Family history:  Family History   Problem Relation Age of Onset    Melanoma Mother     Skin Cancer Mother     Hyperlipidemia Mother     Thyroid Disease Mother     Anesthesia Reaction  Mother         PONV    Skin Cancer Father         SCC and BCC    Skin Cancer Maternal Grandmother     Deep Vein Thrombosis (DVT) Maternal Grandmother     Melanoma Paternal Grandmother     Colon Cancer No family hx of        Social history:  Social History     Tobacco Use    Smoking status: Never    Smokeless tobacco: Never   Substance Use Topics    Alcohol use: Not Currently     Comment: 0 drinks per week     Marital status: .    Nursing Notes:   Danny Parks, EMT  12/19/2024 12:38 PM  Signed  Chief Complaint   Patient presents with    Post-op Visit       Vitals:    12/19/24 1236   BP: 114/76   BP Location: Left arm   Patient Position: Sitting   Cuff Size: Adult Regular   Pulse: 65   SpO2: 98%       There is no height or weight on file to calculate BMI.    Danny Parks EMT-P       20 minutes spent on the date of the encounter doing chart review, history and exam, documentation and further activities as noted above.   This is a postop visit.    LUCAS Saucedo, NP-C  Colon and Rectal Surgery  [facility]    This note was created using speech recognition software and may contain unintended word substitutions.

## 2024-12-20 ENCOUNTER — HOME INFUSION BILLING (OUTPATIENT)
Dept: HOME HEALTH SERVICES | Facility: HOME HEALTH | Age: 39
End: 2024-12-20
Payer: COMMERCIAL

## 2024-12-23 ENCOUNTER — HOME CARE VISIT (OUTPATIENT)
Dept: HOME HEALTH SERVICES | Facility: HOME HEALTH | Age: 39
End: 2024-12-23
Payer: COMMERCIAL

## 2024-12-23 VITALS
DIASTOLIC BLOOD PRESSURE: 68 MMHG | WEIGHT: 149 LBS | TEMPERATURE: 97.1 F | HEART RATE: 72 BPM | RESPIRATION RATE: 16 BRPM | SYSTOLIC BLOOD PRESSURE: 120 MMHG | OXYGEN SATURATION: 98 % | BODY MASS INDEX: 20.21 KG/M2

## 2024-12-23 NOTE — PROGRESS NOTES
"Infusion Nursing Note:  Skilled nurse visit today for Remicade for Karlos Terrazas.   present during visit today: Not Applicable.    Pre-infusion Checklist:   Pre-infusion Checklist    Have you had any delayed reaction since last infusion?   No    Have you recently had an elevated temperature, fever, chills productive cough, coughing for 3 weeks or longer or hemoptysis, abnormal vital signs, night sweats, chest pain, or have you noticed a decrease in your appetite, or noted unexplained weight loss or fatigue?   No    Do you have any open wounds or new incisions?  No    Do you have any recent or upcoming hospitalizations, surgeries, or dental procedures?   Yes, Pt cleared for infusion today after surgery on     Do you currently have or recently have had any signs of illness or infection or are you on any antibiotics?   No    Have you had any new, sudden , or worsening abdominal pain?   No    Have you or anyone in your household received a live vaccination in the past 4 weeks?   No    Have you recently been diagnosed with any new nervous system diseases or cancer diagnosis? (i.e., Multiple Sclerosis, Guillain [place], sezures, neurological changes) Are you receiving any form of radiation or chemotherapy?   No    Are you pregnant or breastfeeding, or do you have plans of pregnancy in the future?   No    Have you been having any signs of worsening depression or suicidal ideation?  No    Have there been any other new onset medical symptoms?  No    Did the patient answer \"YES\" to any of the questions above?  No    Will the patient receive a medication that has an order for infusion reaction management?  Yes, and all drugs and supplies are available and none have .    If ordered, has the patient taken pre-medications?  N/A    Plan:   Therapy is appropriate, will proceed with treatment.     Chemotherapy Treatment Conditions:   Not Applicable    Intravenous Access:  Peripheral IV placed.    Post Infusion " Assessment:  Patient tolerated infusion without incident.  Blood return noted pre and post infusion.  Site patent and intact, free from redness, edema or discomfort.  Access discontinued per protocol.  Biologic Infusion Post Education: Call the triage nurse at your clinic or seek medical attention if you have chills and/or temperature greater than or equal to 100.5, uncontrolled nausea/vomiting, diarrhea, constipation, dizziness, shortness of breath, chest pain, heart palpitations, weakness or any other new or concerning symptoms, questions or concerns.  You cannot have any live virus vaccines prior to or during treatment or up to 6 months post infusion.  If you have an upcoming surgery, medical procedure or dental procedure during treatment, this should be discussed with your ordering physician and your surgeon/dentist.  If you are having any concerning symptom, if you are unsure if you should get your next infusion or wish to speak to a provider before your next infusion, please call your care coordinator or triage nurse at your clinic to notify them so we can adequately serve you.     Note: Pt is alert and oriented. Vital signs are stable. Denies post op abd pain. Reports mild joint pain in his hands, feet and ankles bilaterally. No longer taking rx pain meds but has them on hand if needed. Taking OTC Tylenol prn. Will discuss with his GI provider at his upcoming visit. Having 4 loose stools daily. Overall feeling better than prior to surgery. Has lost some weight post op but reports his appetite is good and he is slowly gaining back weight. No concerns today.     Saline administered (in mLs): 30    Next Visit Plan:   2/4/25 at 0900. Updated service planning.      Annetta Garcia RN 12/23/2024

## 2025-02-03 ENCOUNTER — HOME INFUSION BILLING (OUTPATIENT)
Dept: HOME HEALTH SERVICES | Facility: HOME HEALTH | Age: 40
End: 2025-02-03
Payer: COMMERCIAL

## 2025-02-03 PROCEDURE — S1015 IV TUBING EXTENSION SET: HCPCS

## 2025-02-03 PROCEDURE — A4213 20+ CC SYRINGE ONLY: HCPCS

## 2025-02-03 PROCEDURE — A4215 STERILE NEEDLE: HCPCS

## 2025-02-03 PROCEDURE — A4930 STERILE, GLOVES PER PAIR: HCPCS

## 2025-02-04 ENCOUNTER — HOME CARE VISIT (OUTPATIENT)
Dept: HOME HEALTH SERVICES | Facility: HOME HEALTH | Age: 40
End: 2025-02-04
Payer: COMMERCIAL

## 2025-02-04 ENCOUNTER — LAB REQUISITION (OUTPATIENT)
Dept: LAB | Facility: CLINIC | Age: 40
End: 2025-02-04
Payer: COMMERCIAL

## 2025-02-04 VITALS
DIASTOLIC BLOOD PRESSURE: 76 MMHG | OXYGEN SATURATION: 99 % | TEMPERATURE: 97.3 F | SYSTOLIC BLOOD PRESSURE: 112 MMHG | HEART RATE: 76 BPM | RESPIRATION RATE: 16 BRPM

## 2025-02-04 DIAGNOSIS — K50.90 CROHN'S DISEASE, UNSPECIFIED, WITHOUT COMPLICATIONS (H): ICD-10-CM

## 2025-02-04 LAB
ALBUMIN SERPL BCG-MCNC: 4.5 G/DL (ref 3.5–5.2)
ALP SERPL-CCNC: 86 U/L (ref 40–150)
ALT SERPL W P-5'-P-CCNC: 14 U/L (ref 0–70)
AST SERPL W P-5'-P-CCNC: 21 U/L (ref 0–45)
BASOPHILS # BLD AUTO: 0 10E3/UL (ref 0–0.2)
BASOPHILS NFR BLD AUTO: 1 %
BILIRUB DIRECT SERPL-MCNC: <0.2 MG/DL (ref 0–0.3)
BILIRUB SERPL-MCNC: 0.6 MG/DL
CRP SERPL-MCNC: <3 MG/L
EOSINOPHIL # BLD AUTO: 0.2 10E3/UL (ref 0–0.7)
EOSINOPHIL NFR BLD AUTO: 4 %
ERYTHROCYTE [DISTWIDTH] IN BLOOD BY AUTOMATED COUNT: 12.2 % (ref 10–15)
HCT VFR BLD AUTO: 40.9 % (ref 40–53)
HGB BLD-MCNC: 13.6 G/DL (ref 13.3–17.7)
IMM GRANULOCYTES # BLD: 0 10E3/UL
IMM GRANULOCYTES NFR BLD: 0 %
LYMPHOCYTES # BLD AUTO: 2.1 10E3/UL (ref 0.8–5.3)
LYMPHOCYTES NFR BLD AUTO: 45 %
MCH RBC QN AUTO: 29.2 PG (ref 26.5–33)
MCHC RBC AUTO-ENTMCNC: 33.3 G/DL (ref 31.5–36.5)
MCV RBC AUTO: 88 FL (ref 78–100)
MONOCYTES # BLD AUTO: 0.4 10E3/UL (ref 0–1.3)
MONOCYTES NFR BLD AUTO: 9 %
NEUTROPHILS # BLD AUTO: 2 10E3/UL (ref 1.6–8.3)
NEUTROPHILS NFR BLD AUTO: 42 %
NRBC # BLD AUTO: 0 10E3/UL
NRBC BLD AUTO-RTO: 0 /100
PLATELET # BLD AUTO: 301 10E3/UL (ref 150–450)
PROT SERPL-MCNC: 7.4 G/DL (ref 6.4–8.3)
RBC # BLD AUTO: 4.66 10E6/UL (ref 4.4–5.9)
WBC # BLD AUTO: 4.7 10E3/UL (ref 4–11)

## 2025-02-04 PROCEDURE — 85018 HEMOGLOBIN: CPT | Performed by: INTERNAL MEDICINE

## 2025-02-04 PROCEDURE — 85004 AUTOMATED DIFF WBC COUNT: CPT | Performed by: INTERNAL MEDICINE

## 2025-02-04 PROCEDURE — 86140 C-REACTIVE PROTEIN: CPT | Performed by: INTERNAL MEDICINE

## 2025-02-04 PROCEDURE — S9359 HIT ANTI-TNF PER DIEM: HCPCS

## 2025-02-04 PROCEDURE — 85041 AUTOMATED RBC COUNT: CPT | Performed by: INTERNAL MEDICINE

## 2025-02-04 PROCEDURE — A4217 STERILE WATER/SALINE, 500 ML: HCPCS | Mod: JZ

## 2025-02-04 PROCEDURE — 84075 ASSAY ALKALINE PHOSPHATASE: CPT | Performed by: INTERNAL MEDICINE

## 2025-02-04 PROCEDURE — 84450 TRANSFERASE (AST) (SGOT): CPT | Performed by: INTERNAL MEDICINE

## 2025-02-04 PROCEDURE — 99601 HOME NFS VISIT <2 HRS: CPT

## 2025-02-04 PROCEDURE — 84155 ASSAY OF PROTEIN SERUM: CPT | Performed by: INTERNAL MEDICINE

## 2025-02-04 NOTE — PROGRESS NOTES
Nursing Visit Note:  Nurse visit today for [facility] for Remicade infusion, labs , [place]  for Karlos Terrazas.     present during visit today: Not Applicable.    Intravenous Access:  Labs drawn without difficulty. and Peripheral IV placed.    Infusion Nursing Note:    Pre-infusion Checklist:   Have you had any delayed reaction since last infusion?   No     Have you recently had an elevated temperature, fever, chills productive cough, coughing for 3 weeks or longer or hemoptysis, abnormal vital signs, night sweats, chest pain, or have you noticed a decrease in your appetite, or noted unexplained weight loss or fatigue?   No     Do you have any open wounds or new incisions?  No     Do you have any recent or upcoming hospitalizations, surgeries, or dental procedures? Does not include esophagogastroduodenoscopy, colonoscopy, endoscopic retrograde cholangiopancreatography (ERCP), endoscopic ultrasound (EUS), dental procedures or joint aspiration/steroid injections.   No     Do you currently have or recently have had any signs of illness or infection or are you on any antibiotics?   No     Have you had any new, sudden, or worsening abdominal pain?   No     Have you or anyone in your household received a live vaccination in the past 4 weeks?   No     Have you recently been diagnosed with any new nervous system diseases or cancer diagnosis? (i.e., Multiple Sclerosis, Guillain [place], seizures, neurological changes) Are you receiving any form of radiation or chemotherapy?   No     Are you pregnant or breastfeeding, or do you have plans of pregnancy in the future?   No     Have you been having any signs of worsening depression or suicidal ideation?  No     Have you had any other new onset medical symptoms?  No    Entyvio/Ocrevus/Tyasabri only: Have you been having any new or worsening medical problems such as issues with thinking, eyesight, balance or strength that have persisted over several days?   N/A    Benlysta only:  "Have you been having any signs of worsening depression or suicidal ideations?    N/A    IVIG only: Have you had any new blood clots?  N/A    Did the patient answer \"YES\" to any of the questions above?  No     Will the patient receive a medication that has an order for infusion reaction management?  Yes, and all drugs and supplies are available and none have .     If ordered, has the patient taken pre-medications?  N/A    Plan:   Therapy is appropriate, will proceed with treatment.     Post Infusion Assessment:  Patient tolerated infusion without incident.  Blood return noted pre and post infusion.  Site patent and intact, free from redness, edema or discomfort.  No evidence of extravasations.  Access discontinued per protocol.  Biologic Infusion Post Education: Call the triage nurse at your clinic or seek medical attention if you have chills and/or temperature greater than or equal to 100.5, uncontrolled nausea/vomiting, diarrhea, constipation, dizziness, shortness of breath, chest pain, heart palpitations, weakness or any other new or concerning symptoms, questions or concerns.  You cannot have any live virus vaccines prior to or during treatment or up to 6 months post infusion.  If you have an upcoming surgery, medical procedure or dental procedure during treatment, this should be discussed with your ordering physician and your surgeon/dentist.  If you are having any concerning symptom, if you are unsure if you should get your next infusion or wish to speak to a provider before your next infusion, please call your care coordinator or triage nurse at your clinic to notify them so we can adequately serve you.    and Lab-Only Nursing Note    Labs obtained via VPT    Time Specimen drawn: 0920    Last dose (if applicable): No    Facility sent to: [facility] Tracking number: [id number]    Note: pt reports no new changes . States abd pain improved since surgery . Tolerated interventions well .   Saline " administered: 50 (ml)    Supply Check:   Does the patient have all the supplies they need for the next visit?  No, Order placed for 1 box of alcohol wipes with next delivery     Next visit plan: SN[facility] for Remicade infusion at 9am 3/18/25 confirmed with pt and service planning correct     Christal Magaña RN 2/4/2025

## 2025-02-05 NOTE — PROGRESS NOTES
IBD CLINIC VISIT    CC/REFERRING MD:  Priscilla Suresh    REASON FOR CONSULTATION: follow up CD    ASSESSMENT/PLAN:    Moderate to severe Crohn's ileitis and colitis (histologic disease only) now s/p right tanvi and distal ileal resection with KONOS anastomosis    Doing very well in symptomatic remission. He does have some ongoing loose stools that I think is related to his resent resection. This should improve with time. Will add fiber (discussed the importance of this).    -continue IFX 5 mg/kg q 6 weeks  -start fiber - citrucel powder - titrate up to 2-3 tablespoons per day  -colonoscopy 6 months after surgery to ensure no recurrence (though he is on therapy and this should minimize risk of recurrence)  -labs every 3 months  -continue GI MTM follow up    2. HCM  -MTM follow up  -he has dermatology visit scheduled  -never had steroids so does not need DEXA    The plan was discussed with the patient. He agrees to the plan. All questions answered.      IBD HISTORY  Age at diagnosis: 37 (2023)  Extent of disease: mainly small intestine but histologic colon as well on random biopsies   Disease phenotype: inflammatory/stricturing  Lakshmi-anal disease: none  Current CD medications: infliximab 5 mg/kg q 6 weeks  Prior IBD surgeries: s/p right hemicolectomy with KONO-S anastomosis 12/5/24 with Dr. Osorio  Prior IBD Medications: none    DRUG MONITORING  TPMT enzyme activity: 32.8 (WNL)    6-TGN/6-MMPN levels: NA    Biologic concentration:  -IFX level 9.5 with no antibodies 7/2/24 on 5mg/kg q 8 weeks  -IFX level 7.7 with no antibodies 9/27/23 on 5 mg/kg q 8 weeks    DISEASE ASSESSMENT  Labs  Recent Labs   Lab Test 02/04/25  0920 03/12/24  0836 01/16/24  1108 12/19/23  1608 05/10/23  1601 05/03/23  1012   CRP  --   --   --   --   --  1.3*   SED  --   --  16* 15   < > 16*   HGB 13.6   < > 12.2* 11.6*   < > 11.7*    < > = values in this interval not displayed.     Fecal calprotectin: 452 (6/28/24)  Endoscopy: see  below  Enterography:   -MRE 5/24/24:  IMPRESSION:   When compared with MRE dated 12/12/2023, there is no significant  change in acute inflammation with chronic fibrosis involving 15cm of  the terminal ileum. There is luminal narrowing without upstream  dilatation.  C diff: None    sIBDQ:   IBDQ Score Date IBDQ - Total Score  (Higher score better)   2/2/2025   3:35 PM 61   8/9/2023   8:26 AM 55   5/5/2023  11:59 AM 49       IBD Health Care Maintenance:    Vaccinations:  All patients on biologics should avoid live vaccines.    -- Influenza (every year)  -- TdaP (every 10 years)  -- Pneumococcal Pneumonia (once plus booster at 5 years)  -- Yearly assessment for latent Tb (verbal screening and exam, PPD or QuantiFERON-Tb testing)    One time confirmation of immunity or serologies:  -- Hepatitis A (serologies or immunizations)  -- Hepatitis B (serologies or immunizations)  -- Varicella  -- MMR  -- HPV (all aged 18-26)  -- Meningococcal meningitis (all patients at risk for meningitis)  -- Due to the immunosuppression in this patient, I would not advise administration of live vaccines such as varicella/VZV, intranasal influenza, MMR, or yellow fever vaccine (if travelling).      Bone mineral density screening   -- Recommend all patients supplement with calcium and vitamin D  -- Given no significant prior steroid use DO NOT recommend DEXA if not already done    Cancer Screening:  Colon cancer screening:  Given mainly ileal disease, he likely does NOT need regular dysplasia surveillance. Has had some microscopic colon involvement but I do not think he needs surveillance      Skin cancer screening: Annual visual exam of skin by dermatologist since patient is immunocompromised    Depression Screening:  -- Over the last month, have you felt down, depressed, or hopeless? no  -- Over the last month, have you felt little interest or pleasure doing things? no    Misc:  -- Avoid tobacco use  -- Avoid NSAIDs as there is potentially a  25% chance of causing an IBD flare    Return to clinic in 6 months with IBD JOSE and 12 months with Dr. Leo    Thank you for this consultation.  It was a pleasure to participate in the care of this patient; please contact us with any further questions.  I spent a total of 43 minutes during the day of encounter performed chart review, meeting with patient, patient counseling, care coordination, and documentation.      This note was created with voice recognition software, and while reviewed for accuracy, typos may remain.     Lazaro Leo MD  Division of Gastroenterology, Hepatology and Nutrition  [facility]  Pager: [phone]      HPI:   Currently,     ROS:    No fevers or chills  No weight loss  No blurry vision, double vision or change in vision  No sore throat  No lymphadenopathy  No headache, paraesthesias, or weakness in a limb  No shortness of breath or wheezing  No chest pain or pressure  No arthralgias or myalgias  No rashes or skin changes  No odynophagia or dysphagia  No BRBPR, hematochezia, melena  No dysuria, frequency or urgency  No hot/cold intolerance or polyria  No anxiety or depression    Extra intestinal manifestations of IBD:  No uveitis/episcleritis  No aphthous ulcers   No arthritis   No erythema nodosum/pyoderma gangrenosum.     PERTINENT PAST MEDICAL HISTORY:  Past Medical History:   Diagnosis Date    Anemia 2/1/2023    Crohn's disease    Crohn's disease of small intestine without complication (H)        PREVIOUS SURGERIES:  Past Surgical History:   Procedure Laterality Date    COLONOSCOPY N/A 04/07/2023    Procedure: COLONOSCOPY, WITH BIOPSY;  Surgeon: Lamont Seaman MD;  Location: [facility] OR    LAPAROSCOPIC APPENDECTOMY N/A 02/22/2023    STILL HAS APPENDIX - actually had a diagnostic laparoscopy and Crohn's was diagnosed    LAPAROSCOPIC RESECTION ILEOCOLIC N/A 12/5/2024    Procedure: Laparoscopic ileocecectomy with KONO-S anastomosis;  Surgeon: Talat Osorio MD;  Location:   OR    LASIK  1/1/2019       PREVIOUS ENDOSCOPY:  Results for orders placed or performed during the hospital encounter of 04/07/23   COLONOSCOPY    Collection Time: 04/07/23  6:37 AM   Result Value Ref Range    COLONOSCOPY       [facility]  [address] [place], [place] [address]., [place] [address] [phone]     Endoscopy Department  _______________________________________________________________________________  Patient Name: Nicholas Terrazas            Procedure Date: 4/7/2023 6:37 AM  MRN: [id number]                       Account Number: [id number]  YOB: 1985              Admit Type: Outpatient  Age: 37                               Room: [facility] PROCEDURE ROOM 02  Gender: Male                          Note Status: Finalized  Attending MD: KILO PALMA MD   Total Sedation Time:   _______________________________________________________________________________     Procedure:             Colonoscopy  Indications:           Suspected Crohn's disease of the small bowel and colon  Providers:             KILO PALMA MD, Sera Benítez, NEIL, HAI MARRUFO  Patient Profile:       This is a 37 year old male with recent symptoms of RLQ                          pain and we ight loss over a period of > 1 month. He                          was diagnosed with appendicitis and had surgery;                          however, finding of inflammation in the right colon                          and terminal ileum led to aborted surgery and a                          request for a colonoscopy.  Referring MD:          NICHOLAS ISAAC MD  Medicines:             Monitored Anesthesia Care  Complications:         No immediate complications.  _______________________________________________________________________________  Procedure:             Pre-Anesthesia Assessment:                         - Airway Examination: normal oropharyngeal airway and                          neck mobility.                          - Respiratory Examination: clear to auscultation.                         - CV Examination: regular rate and rhythm.                         - ASA Grade Assessment: I - A normal, healthy patient.                         - After reviewing the risks and benefits, the  patient                          was deemed in satisfactory condition to undergo the                          procedure.                         - The anesthesia plan was to use deep                          sedation/analgesia.                         - Immediately prior to administration of medications,                          the patient was re-assessed for adequacy to receive                          sedatives.                         - Sedation was administered by an anesthesia                          professional. Deep sedation was attained.                         - The heart rate, respiratory rate, oxygen                          saturations, blood pressure, adequacy of pulmonary                          ventilation, and response to care were monitored                          throughout the procedure.                         - The physical status of the patient was re-assessed                          after the procedure.                         After obtaining informed cons ent, the colonoscope was                          passed under direct vision. Throughout the procedure,                          the patient's blood pressure, pulse, and oxygen                          saturations were monitored continuously. The                          Colonoscope was introduced through the anus and                          advanced to the terminal ileum. The colonoscopy was                          performed without difficulty. The patient tolerated                          the procedure well. The quality of the bowel                          preparation was evaluated using the BBPS ([place] Bowel                          Preparation Scale)  with scores of: Right Colon = 3,                          Transverse Colon = 3 and Left Colon = 3 (entire mucosa                          seen well with no residual staining, small fragments                          of stool or opaque liquid). The total BBPS score                          equals 9. The terminal ileum, ileocecal va lve,                          appendiceal orifice, and rectum were photographed.                                                                                   Findings:       No aphthae or ulcerations. The IC valve is open; however, advancing the        scope beyond ~ 3 cm into the terminal ileum was not possible due to        either stricture or extrinsic compression. Biopsies were taken in the        accessible terminal ileum.       Essentially unremarkable mucosa, although rare punctate red lesions with        whitish center were noted in the left colon. Biopsies of the colon        mucosa were done.       The area of the appendix was edematous.                                                                                   Impression:            - The findings are not diagnostic of Crohn's disease                          and could be consistent with recent appendicitis.                          However, examination of the terminal ileum was limited.  Recommendation:        -  Await pathology results.                         - Consider repeat abdominal imaging.                                                                                     Signed Electronically by Lamont Palma MD  _____________________  LAMONT PALMA MD  4/7/2023 10:06:36 AM  I was physically present for the entire viewing portion of the exam.  __________________________  Signature of teaching physician  LAMONT PALMA MD  Number of Addenda: 0    Note Initiated On: 4/7/2023 6:37 AM  Scope In:  Scope Out:     ]    ALLERGIES:     Allergies   Allergen Reactions    Other Food Allergy Angioedema      "carrots       PERTINENT MEDICATIONS:    Current Outpatient Medications:     acetaminophen (TYLENOL) 500 MG tablet, Take 2 tablets (1,000 mg) by mouth every 6 hours., Disp: 100 tablet, Rfl: 0    CALCIUM-VITAMIN D PO, Take 1 tablet by mouth daily, Disp: , Rfl:     carboxymethylcellulose PF (CARBOXYMETHYLCELLULOSE SODIUM) 0.5 % ophthalmic solution, Place 1 drop into both eyes 4 times daily Preservative free artificial tears, single use vials., Disp: 400 each, Rfl: 11    diphenhydrAMINE (BENADRYL) 50 MG/ML injection, Inject 1 mL (50 mg) over 3-5 minutes into the vein via push as needed for other (infusion reaction). For RN use only.  Draw up in a syringe and administer IV push.  Discard remainder of vial., Disp: 55000 mL, Rfl: 0    Emergency Supply Kit, PIV,, Patient use for emergency only. Contents: 3 sodium chloride 0.9% flushes, 1 IV start kit, 1 microclave ext set 14\", 1 each IV Cath 22 G/1\" and 24G/3/4\", 6 alcohol prep pads, 4 nitrile gloves (med). Call [phone] to reorder., Disp: 842135 kit, Rfl: 0    EPINEPHrine (ANY BX GENERIC EQUIV) 0.3 MG/0.3ML injection 2-pack, Inject 0.3 mLs (0.3 mg) into the muscle as needed for anaphylaxis (infusion reaction). Administer into the mid-thigh in case of severe anaphylaxis (wheezing, throat tightening, mouth swelling, difficulty breathing). May repeat dose one time in 5-15 minutes if symptoms persist., Disp: 118346 mL, Rfl: 0    FERROUS BISGLYCINATE CHELATE PO, Take 1 tablet by mouth three times a week, Disp: , Rfl:     inFLIXimab (REMICADE) 100 MG injection, Add to infusion 70 mLs (700 mg) once every six weeks. Reconstitute infliximab vial(s).  Draw up infliximab 10 mg/mL in syringe with 21 G needle and add to NaCl 0.9% bag immediately prior to infusing.  MIX GENTLY BY INVERSION, DO NOT SHAKE. Discard remainder of vial(s)., Disp: 25 each, Rfl: 0    inFLIXimab (REMICADE) 100 MG injection, Add to infusion 70 mLs (700 mg) once every six weeks. PLEASE WAIT 4 WEEKS AFTER " SURGERY BEFORE RESTARTING REMICADE.    Reconstitute infliximab vial(s).  Draw up infliximab 10 mg/mL in syringe with 21 G needle and add to NaCl 0.9% bag immediately prior to infusing.  MIX GENTLY BY INVERSION, DO NOT SHAKE. Discard remainder of vial(s)., Disp: , Rfl:     inFLIXimab, REMICADE, 100 MG injection, Inject 699 mg into the vein once every six weeks. PLEASE WAIT 4 WEEKS AFTER SURGERY BEFORE RESUMING REMICADE, Disp: , Rfl:     methylPREDNISolone Na Suc, PF, (SOLU-MEDROL) 125 mg/2 mL injection, Inject 2 mLs (125 mg) over 3-5 minutes into the vein via push as needed (infusion reaction). For RN use only.  Reconstitute vial. Draw up methylPREDNISolone in a syringe and administer.  Discard remainder of vial., Disp: 138544 mL, Rfl: 0    ondansetron (ZOFRAN) 4 MG tablet, Take 1 tablet (4 mg) by mouth every 6 hours as needed for nausea., Disp: 5 tablet, Rfl: 0    oxyCODONE (ROXICODONE) 5 MG tablet, Take 1-2 tablets (5-10 mg) by mouth every 6 hours as needed for moderate to severe pain. (Patient not taking: Reported on 12/19/2024), Disp: 18 tablet, Rfl: 0    sodium chloride 0.9% bag, Infuse 250 mLs over 1 hours into the vein once every six weeks. Add 70 mL (700 mg) of infliximab 10 mg/mL to bag immediately prior to infusing via gravity infusion. When complete, flush bag with 20 mL NS to flush tubing., Disp: 894378 mL, Rfl: 0    sodium chloride 0.9% infusion, Infuse 500 mLs into the vein as needed for other (infusion reaction). In case of mild reaction, administer via gravity at 20 mL/hr to keep vein open. In case of severe reaction, administer via gravity wide open on prime setting. (Patient not taking: Reported on 12/19/2024), Disp: 727132 mL, Rfl: 0    sodium chloride, PF, 0.9% PF flush, Inject 10 mLs into the vein as needed for other (infusion reaction). For RN use only as needed for infusion reaction (Patient not taking: Reported on 12/19/2024), Disp: 559761 mL, Rfl: 0    sodium chloride, PF, 0.9% PF flush,  Inject 10 mLs into the vein as needed for line flush. Flush IV before and after medication administration as directed and/or at least every 12 hours., Disp: 322817 mL, Rfl: 0    sterile water, preservative free, injection, Use 70 mLs for reconstitution once every six weeks. 1. Reconstitute each vial of infliximab with 10 mL of sterile water for injection by slowly injecting 10 mL sterile water down the inside wall of vial w/21 G needle. DO NOT SHAKE. Foaming of the solution on reconstitution is not unusual. Roll and tilt each vial gently.  2. Let drug stand for 5 minutes. 3. Inspect vials for particules and/or discoloration prior to continuing. The solution should be colorless to light yellow and opalescent, and may develop a few translucent particles. DO NOT USE IF DRUG HAS NOT FULLY DISSOLVED OR IF OPAQUE PARTICLES, DISCOLORATION OR OTHER FOREIGN PARTICULES ARE PRESENT. 4. Draw up appropriate dose w/ 21 G needle from vial., Disp: 280 mL, Rfl: 0    SOCIAL HISTORY:  Social History     Socioeconomic History    Marital status:      Spouse name: Not on file    Number of children: Not on file    Years of education: Not on file    Highest education level: Not on file   Occupational History    Not on file   Tobacco Use    Smoking status: Never    Smokeless tobacco: Never   Vaping Use    Vaping status: Never Used   Substance and Sexual Activity    Alcohol use: Not Currently     Comment: 0 drinks per week    Drug use: Not Currently     Types: Marijuana     Comment: Occasional 2x month    Sexual activity: Yes     Partners: Female     Birth control/protection: Condom   Other Topics Concern    Parent/sibling w/ CABG, MI or angioplasty before 65F 55M? No   Social History Narrative    Not on file     Social Drivers of Health     Financial Resource Strain: Low Risk  (1/26/2025)    Financial Resource Strain     Within the past 12 months, have you or your family members you live with been unable to get utilities (heat,  electricity) when it was really needed?: No   Food Insecurity: Low Risk  (1/26/2025)    Food Insecurity     Within the past 12 months, did you worry that your food would run out before you got money to buy more?: No     Within the past 12 months, did the food you bought just not last and you didn t have money to get more?: No   Transportation Needs: Low Risk  (1/26/2025)    Transportation Needs     Within the past 12 months, has lack of transportation kept you from medical appointments, getting your medicines, non-medical meetings or appointments, work, or from getting things that you need?: No   Physical Activity: Patient Declined (1/26/2025)    Exercise Vital Sign     Days of Exercise per Week: Patient declined     Minutes of Exercise per Session: Patient declined   Stress: No Stress Concern Present (1/26/2025)    [employer] - Occupational Stress Questionnaire     Feeling of Stress : Not at all   Social Connections: Unknown (1/26/2025)    Social Connection and Isolation Panel [NHANES]     Frequency of Communication with Friends and Family: Not on file     Frequency of Social Gatherings with Friends and Family: Three times a week     Attends Yazidism Services: Not on file     Active Member of Clubs or Organizations: Not on file     Attends Club or Organization Meetings: Not on file     Marital Status: Not on file   Interpersonal Safety: Low Risk  (12/5/2024)    Interpersonal Safety     Do you feel physically and emotionally safe where you currently live?: Yes     Within the past 12 months, have you been hit, slapped, kicked or otherwise physically hurt by someone?: No     Within the past 12 months, have you been humiliated or emotionally abused in other ways by your partner or ex-partner?: No   Housing Stability: High Risk (1/26/2025)    Housing Stability     Do you have housing? : No     Are you worried about losing your housing?: No       FAMILY HISTORY:  Family History   Problem  Relation Age of Onset    Melanoma Mother     Skin Cancer Mother     Hyperlipidemia Mother     Thyroid Disease Mother     Anesthesia Reaction Mother         PONV    Skin Cancer Father         SCC and BCC    Skin Cancer Maternal Grandmother     Deep Vein Thrombosis (DVT) Maternal Grandmother     Melanoma Paternal Grandmother     Colon Cancer No family hx of        Past/family/social history reviewed and no changes    PHYSICAL EXAMINATION:  Constitutional: aaox3, cooperative, pleasant, not dyspneic/diaphoretic, no acute distress  Vitals reviewed: There were no vitals taken for this visit.  Wt:   Wt Readings from Last 2 Encounters:   12/23/24 67.6 kg (149 lb)   12/09/24 64.2 kg (141 lb 8 oz)      Eyes: Sclera anicteric/injected  Ears/nose/mouth/throat: Normal oropharynx without ulcers or exudate, mucus membranes moist, hearing intact  Neck: supple, thyroid normal size  CV: No edema  Respiratory: Unlabored breathing  Lymph: No axillary, submandibular, supraclavicular or inguinal lymphadenopathy  Abd:  Nondistended, +bs, no hepatosplenomegaly, nontender, no peritoneal signs; well healed surgical scar  Skin: warm, perfused, no jaundice  Psych: Normal affect  MSK: Normal gait      PERTINENT STUDIES:  Most recent CBC:  Recent Labs   Lab Test 02/04/25  0920 12/09/24  0628   WBC 4.7 10.0   HGB 13.6 13.7   HCT 40.9 40.7    314     Most recent hepatic panel:  Recent Labs   Lab Test 02/04/25  0920 11/21/24  0926   ALT 14 13   AST 21 22     Most recent creatinine:  Recent Labs   Lab Test 12/09/24  0628 12/08/24  0500   CR 0.89 0.92

## 2025-02-06 ENCOUNTER — OFFICE VISIT (OUTPATIENT)
Dept: GASTROENTEROLOGY | Facility: CLINIC | Age: 40
End: 2025-02-06
Attending: INTERNAL MEDICINE
Payer: COMMERCIAL

## 2025-02-06 VITALS
WEIGHT: 150 LBS | BODY MASS INDEX: 20.32 KG/M2 | HEART RATE: 77 BPM | OXYGEN SATURATION: 100 % | SYSTOLIC BLOOD PRESSURE: 112 MMHG | HEIGHT: 72 IN | DIASTOLIC BLOOD PRESSURE: 76 MMHG

## 2025-02-06 DIAGNOSIS — K50.018 CROHN'S DISEASE OF ILEUM WITH OTHER COMPLICATION (H): Primary | ICD-10-CM

## 2025-02-06 DIAGNOSIS — D84.9 IMMUNOSUPPRESSION: ICD-10-CM

## 2025-02-06 ASSESSMENT — PAIN SCALES - GENERAL: PAINLEVEL_OUTOF10: NO PAIN (0)

## 2025-02-06 NOTE — NURSING NOTE
Do you have a history of colon cancer in your immediate family? NO    If yes who: negative     And what age  were they diagnosed: n/a      Chief Complaint   Patient presents with    RECHECK       Vitals:    02/06/25 1419   BP: 112/76   Pulse: 77   SpO2: 100%   Weight: 150 lb   Height: 6'       Body mass index is 20.34 kg/m .    Luh Huntley MA

## 2025-02-06 NOTE — PATIENT INSTRUCTIONS
It was a pleasure meeting with you today and discussing your healthcare plan. Below is a summary of what we covered:    Continue the infliximab every 6 weeks    Labs with infiusions    My office will call you to schedule the colonoscopy (aim for June-ish)    Start citrucel fiber powder 1 tablespoon daily in a glass of water. Slowly increase to 2-3 tablespoons per day.     Follow up in 6 months with IBD PA and Dr. Leo in 12 months      Please see below for any additional questions and scheduling guidelines.    Sign up for [employer]: [employer] patient portal serves as a secure platform for accessing your medical records from the [facility]. Additionally, [employer] facilitates easy, timely, and secure messaging with your care team. If you have not signed up, you may do so by using the provided code or calling [phone].    Coordinating your care after your visit:  There are multiple options for scheduling your follow-up care based on your provider's recommendation.    How do I schedule a follow-up clinic appointment:   After your appointment, you may receive scheduling assistance with the Clinic Coordinators by having a seat in the waiting room and a Clinic Coordinator will call you up to schedule.  Virtual visits or after you leave the clinic:  Your provider has placed a follow-up order in the [employer] portal for scheduling your return appointment. A member of the scheduling team will contact you to schedule.  [employer] Scheduling: Timely scheduling through [employer] is advised to ensure appointment availability.   Call to schedule: You may schedule your follow-up appointment(s) by calling [phone], option 1.    How do I schedule my endoscopy or colonoscopy procedure:  If a procedure, such as a colonoscopy or upper endoscopy was ordered by your provider, the scheduling team will contact you to schedule this procedure. Or you may choose to call to schedule at   [phone], option 2.  Please allow 20-30  minutes when scheduling a procedure.    How do I get my blood work done? To get your blood work done, you need to schedule a lab appointment at an [facility]. There are multiple ways to schedule:   At the clinic: The Clinic Coordinator you meet after your visit can help you schedule a lab appointment.   [facility] scheduling: [facility] offers online lab scheduling at all [facility] laboratory locations.   Call to schedule: You can call [phone] to schedule your lab appointment.    How do I schedule my imaging study: To schedule imaging studies, such as CT scans, ultrasounds, MRIs, or X-rays, contact Imaging Services at [phone].    How do I schedule a referral to another doctor: If your provider recommended a referral to another specialist(s), the referral order was placed by your provider. You will receive a phone call to schedule this referral, or you may choose to call the number attached to the referral to self-schedule.    For Post-Visit Question(s):  For any inquiries following today's visit:  Please utilize [employer] messaging and allow 48 hours for reply or contact the Call Center during normal business hours at [phone], option 3.  For Emergent After-hours questions, contact the On-Call GI Fellow through the [facility]  at [phone].  In addition, you may contact your Nurse directly using the provided contact information.    Test Results:  Test results will be accessible via [employer] in compliance with the 21st Century Cures Act. This means that your results will be available to you at the same time as your provider. Often you may see your results before your provider does. Results are reviewed by staff within two weeks with communication follow-up. Results may be released in the patient portal prior to your care team review.    Prescription Refill(s):  Medication prescribed by your provider will be addressed during your visit. For future refills, please  coordinate with your pharmacy. If you have not had a recent clinic visit or routine labs, for your safety, your provider may not be able to refill your prescription.

## 2025-02-06 NOTE — LETTER
2/6/2025      Karlos Terrazas  [address] [facility] [address]      Dear Colleague,    Thank you for referring your patient, Karlos Terrazas, to the [facility] [place]. Please see a copy of my visit note below.    IBD CLINIC VISIT    CC/REFERRING MD:  Priscilla Suresh    REASON FOR CONSULTATION: follow up CD    ASSESSMENT/PLAN:    Moderate to severe Crohn's ileitis and colitis (histologic disease only) now s/p right tanvi and distal ileal resection with KONOS anastomosis    Doing very well in symptomatic remission. He does have some ongoing loose stools that I think is related to his resent resection. This should improve with time. Will add fiber (discussed the importance of this).    -continue IFX 5 mg/kg q 6 weeks  -start fiber - citrucel powder - titrate up to 2-3 tablespoons per day  -colonoscopy 6 months after surgery to ensure no recurrence (though he is on therapy and this should minimize risk of recurrence)  -labs every 3 months  -continue GI MTM follow up    2. HCM  -MTM follow up  -he has dermatology visit scheduled  -never had steroids so does not need DEXA    The plan was discussed with the patient. He agrees to the plan. All questions answered.      IBD HISTORY  Age at diagnosis: 37 (2023)  Extent of disease: mainly small intestine but histologic colon as well on random biopsies   Disease phenotype: inflammatory/stricturing  Lakshmi-anal disease: none  Current CD medications: infliximab 5 mg/kg q 6 weeks  Prior IBD surgeries: s/p right hemicolectomy with KONO-S anastomosis 12/5/24 with Dr. Osorio  Prior IBD Medications: none    DRUG MONITORING  TPMT enzyme activity: 32.8 (WNL)    6-TGN/6-MMPN levels: NA    Biologic concentration:  -IFX level 9.5 with no antibodies 7/2/24 on 5mg/kg q 8 weeks  -IFX level 7.7 with no antibodies 9/27/23 on 5 mg/kg q 8 weeks    DISEASE ASSESSMENT  Labs  Recent Labs   Lab Test 02/04/25  0920 03/12/24  0836 01/16/24  1108 12/19/23  1608  05/10/23  1601 05/03/23  1012   CRP  --   --   --   --   --  1.3*   SED  --   --  16* 15   < > 16*   HGB 13.6   < > 12.2* 11.6*   < > 11.7*    < > = values in this interval not displayed.     Fecal calprotectin: 452 (6/28/24)  Endoscopy: see below  Enterography:   -MRE 5/24/24:  IMPRESSION:   When compared with MRE dated 12/12/2023, there is no significant  change in acute inflammation with chronic fibrosis involving 15cm of  the terminal ileum. There is luminal narrowing without upstream  dilatation.  C diff: None    sIBDQ:   IBDQ Score Date IBDQ - Total Score  (Higher score better)   2/2/2025   3:35 PM 61   8/9/2023   8:26 AM 55   5/5/2023  11:59 AM 49       IBD Health Care Maintenance:    Vaccinations:  All patients on biologics should avoid live vaccines.    -- Influenza (every year)  -- TdaP (every 10 years)  -- Pneumococcal Pneumonia (once plus booster at 5 years)  -- Yearly assessment for latent Tb (verbal screening and exam, PPD or QuantiFERON-Tb testing)    One time confirmation of immunity or serologies:  -- Hepatitis A (serologies or immunizations)  -- Hepatitis B (serologies or immunizations)  -- Varicella  -- MMR  -- HPV (all aged 18-26)  -- Meningococcal meningitis (all patients at risk for meningitis)  -- Due to the immunosuppression in this patient, I would not advise administration of live vaccines such as varicella/VZV, intranasal influenza, MMR, or yellow fever vaccine (if travelling).      Bone mineral density screening   -- Recommend all patients supplement with calcium and vitamin D  -- Given no significant prior steroid use DO NOT recommend DEXA if not already done    Cancer Screening:  Colon cancer screening:  Given mainly ileal disease, he likely does NOT need regular dysplasia surveillance. Has had some microscopic colon involvement but I do not think he needs surveillance      Skin cancer screening: Annual visual exam of skin by dermatologist since patient is  immunocompromised    Depression Screening:  -- Over the last month, have you felt down, depressed, or hopeless? no  -- Over the last month, have you felt little interest or pleasure doing things? no    Misc:  -- Avoid tobacco use  -- Avoid NSAIDs as there is potentially a 25% chance of causing an IBD flare    Return to clinic in 6 months with IBD JOSE and 12 months with Dr. Leo    Thank you for this consultation.  It was a pleasure to participate in the care of this patient; please contact us with any further questions.  I spent a total of 43 minutes during the day of encounter performed chart review, meeting with patient, patient counseling, care coordination, and documentation.      This note was created with voice recognition software, and while reviewed for accuracy, typos may remain.     Lazaro Leo MD  Division of Gastroenterology, Hepatology and Nutrition  [facility]  Pager: [phone]      HPI:   Currently,     ROS:    No fevers or chills  No weight loss  No blurry vision, double vision or change in vision  No sore throat  No lymphadenopathy  No headache, paraesthesias, or weakness in a limb  No shortness of breath or wheezing  No chest pain or pressure  No arthralgias or myalgias  No rashes or skin changes  No odynophagia or dysphagia  No BRBPR, hematochezia, melena  No dysuria, frequency or urgency  No hot/cold intolerance or polyria  No anxiety or depression    Extra intestinal manifestations of IBD:  No uveitis/episcleritis  No aphthous ulcers   No arthritis   No erythema nodosum/pyoderma gangrenosum.     PERTINENT PAST MEDICAL HISTORY:  Past Medical History:   Diagnosis Date     Anemia 2/1/2023    Crohn's disease     Crohn's disease of small intestine without complication (H)        PREVIOUS SURGERIES:  Past Surgical History:   Procedure Laterality Date     COLONOSCOPY N/A 04/07/2023    Procedure: COLONOSCOPY, WITH BIOPSY;  Surgeon: Lamont Seaman MD;  Location: [facility] OR     LAPAROSCOPIC  APPENDECTOMY N/A 02/22/2023    STILL HAS APPENDIX - actually had a diagnostic laparoscopy and Crohn's was diagnosed     LAPAROSCOPIC RESECTION ILEOCOLIC N/A 12/5/2024    Procedure: Laparoscopic ileocecectomy with KONO-S anastomosis;  Surgeon: Talat Osorio MD;  Location: [facility]  1/1/2019       PREVIOUS ENDOSCOPY:  Results for orders placed or performed during the hospital encounter of 04/07/23   COLONOSCOPY    Collection Time: 04/07/23  6:37 AM   Result Value Ref Range    COLONOSCOPY       [facility]  [address] [place], [place] [address]., [place] [address] [phone]     Endoscopy Department  _______________________________________________________________________________  Patient Name: Nicholas Terrazas            Procedure Date: 4/7/2023 6:37 AM  MRN: [id number]                       Account Number: [id number]  YOB: 1985              Admit Type: Outpatient  Age: 37                               [facility]  Gender: Male                          Note Status: Finalized  Attending MD: KILO PALMA MD   Total Sedation Time:   _______________________________________________________________________________     Procedure:             Colonoscopy  Indications:           Suspected Crohn's disease of the small bowel and colon  Providers:             KILO PALMA MD, Sera Benítez, RN, HAI MARRUFO  Patient Profile:       This is a 37 year old male with recent symptoms of RLQ                          pain and we ight loss over a period of > 1 month. He                          was diagnosed with appendicitis and had surgery;                          however, finding of inflammation in the right colon                          and terminal ileum led to aborted surgery and a                          request for a colonoscopy.  Referring MD:          NICHOLAS ISAAC MD  Medicines:             Monitored Anesthesia Care  Complications:         No  immediate complications.  _______________________________________________________________________________  Procedure:             Pre-Anesthesia Assessment:                         - Airway Examination: normal oropharyngeal airway and                          neck mobility.                         - Respiratory Examination: clear to auscultation.                         - CV Examination: regular rate and rhythm.                         - ASA Grade Assessment: I - A normal, healthy patient.                         - After reviewing the risks and benefits, the  patient                          was deemed in satisfactory condition to undergo the                          procedure.                         - The anesthesia plan was to use deep                          sedation/analgesia.                         - Immediately prior to administration of medications,                          the patient was re-assessed for adequacy to receive                          sedatives.                         - Sedation was administered by an anesthesia                          professional. Deep sedation was attained.                         - The heart rate, respiratory rate, oxygen                          saturations, blood pressure, adequacy of pulmonary                          ventilation, and response to care were monitored                          throughout the procedure.                         - The physical status of the patient was re-assessed                          after the procedure.                         After obtaining informed cons ent, the colonoscope was                          passed under direct vision. Throughout the procedure,                          the patient's blood pressure, pulse, and oxygen                          saturations were monitored continuously. The                          Colonoscope was introduced through the anus and                          advanced to the terminal ileum. The  colonoscopy was                          performed without difficulty. The patient tolerated                          the procedure well. The quality of the bowel                          preparation was evaluated using the BBPS ([place] Bowel                          Preparation Scale) with scores of: Right Colon = 3,                          Transverse Colon = 3 and Left Colon = 3 (entire mucosa                          seen well with no residual staining, small fragments                          of stool or opaque liquid). The total BBPS score                          equals 9. The terminal ileum, ileocecal va lve,                          appendiceal orifice, and rectum were photographed.                                                                                   Findings:       No aphthae or ulcerations. The IC valve is open; however, advancing the        scope beyond ~ 3 cm into the terminal ileum was not possible due to        either stricture or extrinsic compression. Biopsies were taken in the        accessible terminal ileum.       Essentially unremarkable mucosa, although rare punctate red lesions with        whitish center were noted in the left colon. Biopsies of the colon        mucosa were done.       The area of the appendix was edematous.                                                                                   Impression:            - The findings are not diagnostic of Crohn's disease                          and could be consistent with recent appendicitis.                          However, examination of the terminal ileum was limited.  Recommendation:        -  Await pathology results.                         - Consider repeat abdominal imaging.                                                                                     Signed Electronically by Lamont Palma MD  _____________________  LAMONT PALMA MD  4/7/2023 10:06:36 AM  I was physically present for the entire  "viewing portion of the exam.  __________________________  Signature of teaching physician  KILO PALMA MD  Number of Addenda: 0    Note Initiated On: 4/7/2023 6:37 AM  Scope In:  Scope Out:     ]    ALLERGIES:     Allergies   Allergen Reactions     Other Food Allergy Angioedema     carrots       PERTINENT MEDICATIONS:    Current Outpatient Medications:      acetaminophen (TYLENOL) 500 MG tablet, Take 2 tablets (1,000 mg) by mouth every 6 hours., Disp: 100 tablet, Rfl: 0     CALCIUM-VITAMIN D PO, Take 1 tablet by mouth daily, Disp: , Rfl:      carboxymethylcellulose PF (CARBOXYMETHYLCELLULOSE SODIUM) 0.5 % ophthalmic solution, Place 1 drop into both eyes 4 times daily Preservative free artificial tears, single use vials., Disp: 400 each, Rfl: 11     diphenhydrAMINE (BENADRYL) 50 MG/ML injection, Inject 1 mL (50 mg) over 3-5 minutes into the vein via push as needed for other (infusion reaction). For RN use only.  Draw up in a syringe and administer IV push.  Discard remainder of vial., Disp: 14401 mL, Rfl: 0     Emergency Supply Kit, PIV,, Patient use for emergency only. Contents: 3 sodium chloride 0.9% flushes, 1 IV start kit, 1 microclave ext set 14\", 1 each IV Cath 22 G/1\" and 24G/3/4\", 6 alcohol prep pads, 4 nitrile gloves (med). Call [phone] to reorder., Disp: 250721 kit, Rfl: 0     EPINEPHrine (ANY BX GENERIC EQUIV) 0.3 MG/0.3ML injection 2-pack, Inject 0.3 mLs (0.3 mg) into the muscle as needed for anaphylaxis (infusion reaction). Administer into the mid-thigh in case of severe anaphylaxis (wheezing, throat tightening, mouth swelling, difficulty breathing). May repeat dose one time in 5-15 minutes if symptoms persist., Disp: 736203 mL, Rfl: 0     FERROUS BISGLYCINATE CHELATE PO, Take 1 tablet by mouth three times a week, Disp: , Rfl:      inFLIXimab (REMICADE) 100 MG injection, Add to infusion 70 mLs (700 mg) once every six weeks. Reconstitute infliximab vial(s).  Draw up infliximab 10 mg/mL in " syringe with 21 G needle and add to NaCl 0.9% bag immediately prior to infusing.  MIX GENTLY BY INVERSION, DO NOT SHAKE. Discard remainder of vial(s)., Disp: 25 each, Rfl: 0     inFLIXimab (REMICADE) 100 MG injection, Add to infusion 70 mLs (700 mg) once every six weeks. PLEASE WAIT 4 WEEKS AFTER SURGERY BEFORE RESTARTING REMICADE.    Reconstitute infliximab vial(s).  Draw up infliximab 10 mg/mL in syringe with 21 G needle and add to NaCl 0.9% bag immediately prior to infusing.  MIX GENTLY BY INVERSION, DO NOT SHAKE. Discard remainder of vial(s)., Disp: , Rfl:      inFLIXimab, REMICADE, 100 MG injection, Inject 699 mg into the vein once every six weeks. PLEASE WAIT 4 WEEKS AFTER SURGERY BEFORE RESUMING REMICADE, Disp: , Rfl:      methylPREDNISolone Na Suc, PF, (SOLU-MEDROL) 125 mg/2 mL injection, Inject 2 mLs (125 mg) over 3-5 minutes into the vein via push as needed (infusion reaction). For RN use only.  Reconstitute vial. Draw up methylPREDNISolone in a syringe and administer.  Discard remainder of vial., Disp: 189159 mL, Rfl: 0     ondansetron (ZOFRAN) 4 MG tablet, Take 1 tablet (4 mg) by mouth every 6 hours as needed for nausea., Disp: 5 tablet, Rfl: 0     oxyCODONE (ROXICODONE) 5 MG tablet, Take 1-2 tablets (5-10 mg) by mouth every 6 hours as needed for moderate to severe pain. (Patient not taking: Reported on 12/19/2024), Disp: 18 tablet, Rfl: 0     sodium chloride 0.9% bag, Infuse 250 mLs over 1 hours into the vein once every six weeks. Add 70 mL (700 mg) of infliximab 10 mg/mL to bag immediately prior to infusing via gravity infusion. When complete, flush bag with 20 mL NS to flush tubing., Disp: 390946 mL, Rfl: 0     sodium chloride 0.9% infusion, Infuse 500 mLs into the vein as needed for other (infusion reaction). In case of mild reaction, administer via gravity at 20 mL/hr to keep vein open. In case of severe reaction, administer via gravity wide open on prime setting. (Patient not taking: Reported on  12/19/2024), Disp: 439423 mL, Rfl: 0     sodium chloride, PF, 0.9% PF flush, Inject 10 mLs into the vein as needed for other (infusion reaction). For RN use only as needed for infusion reaction (Patient not taking: Reported on 12/19/2024), Disp: 244083 mL, Rfl: 0     sodium chloride, PF, 0.9% PF flush, Inject 10 mLs into the vein as needed for line flush. Flush IV before and after medication administration as directed and/or at least every 12 hours., Disp: 193810 mL, Rfl: 0     sterile water, preservative free, injection, Use 70 mLs for reconstitution once every six weeks. 1. Reconstitute each vial of infliximab with 10 mL of sterile water for injection by slowly injecting 10 mL sterile water down the inside wall of vial w/21 G needle. DO NOT SHAKE. Foaming of the solution on reconstitution is not unusual. Roll and tilt each vial gently.  2. Let drug stand for 5 minutes. 3. Inspect vials for particules and/or discoloration prior to continuing. The solution should be colorless to light yellow and opalescent, and may develop a few translucent particles. DO NOT USE IF DRUG HAS NOT FULLY DISSOLVED OR IF OPAQUE PARTICLES, DISCOLORATION OR OTHER FOREIGN PARTICULES ARE PRESENT. 4. Draw up appropriate dose w/ 21 G needle from vial., Disp: 280 mL, Rfl: 0    SOCIAL HISTORY:  Social History     Socioeconomic History     Marital status:      Spouse name: Not on file     Number of children: Not on file     Years of education: Not on file     Highest education level: Not on file   Occupational History     Not on file   Tobacco Use     Smoking status: Never     Smokeless tobacco: Never   Vaping Use     Vaping status: Never Used   Substance and Sexual Activity     Alcohol use: Not Currently     Comment: 0 drinks per week     Drug use: Not Currently     Types: Marijuana     Comment: Occasional 2x month     Sexual activity: Yes     Partners: Female     Birth control/protection: Condom   Other Topics Concern     Parent/sibling  w/ CABG, MI or angioplasty before 65F 55M? No   Social History Narrative     Not on file     Social Drivers of Health     Financial Resource Strain: Low Risk  (1/26/2025)    Financial Resource Strain      Within the past 12 months, have you or your family members you live with been unable to get utilities (heat, electricity) when it was really needed?: No   Food Insecurity: Low Risk  (1/26/2025)    Food Insecurity      Within the past 12 months, did you worry that your food would run out before you got money to buy more?: No      Within the past 12 months, did the food you bought just not last and you didn t have money to get more?: No   Transportation Needs: Low Risk  (1/26/2025)    Transportation Needs      Within the past 12 months, has lack of transportation kept you from medical appointments, getting your medicines, non-medical meetings or appointments, work, or from getting things that you need?: No   Physical Activity: Patient Declined (1/26/2025)    Exercise Vital Sign      Days of Exercise per Week: Patient declined      Minutes of Exercise per Session: Patient declined   Stress: No Stress Concern Present (1/26/2025)    [employer] - Occupational Stress Questionnaire      Feeling of Stress : Not at all   Social Connections: Unknown (1/26/2025)    Social Connection and Isolation Panel [NHANES]      Frequency of Communication with Friends and Family: Not on file      Frequency of Social Gatherings with Friends and Family: Three times a week      Attends [employer] Services: Not on file      Active Member of Clubs or Organizations: Not on file      Attends Club or Organization Meetings: Not on file      Marital Status: Not on file   Interpersonal Safety: Low Risk  (12/5/2024)    Interpersonal Safety      Do you feel physically and emotionally safe where you currently live?: Yes      Within the past 12 months, have you been hit, slapped, kicked or otherwise physically hurt by  someone?: No      Within the past 12 months, have you been humiliated or emotionally abused in other ways by your partner or ex-partner?: No   Housing Stability: High Risk (1/26/2025)    Housing Stability      Do you have housing? : No      Are you worried about losing your housing?: No       FAMILY HISTORY:  Family History   Problem Relation Age of Onset     Melanoma Mother      Skin Cancer Mother      Hyperlipidemia Mother      Thyroid Disease Mother      Anesthesia Reaction Mother         PONV     Skin Cancer Father         SCC and BCC     Skin Cancer Maternal Grandmother      Deep Vein Thrombosis (DVT) Maternal Grandmother      Melanoma Paternal Grandmother      Colon Cancer No family hx of        Past/family/social history reviewed and no changes    PHYSICAL EXAMINATION:  Constitutional: aaox3, cooperative, pleasant, not dyspneic/diaphoretic, no acute distress  Vitals reviewed: There were no vitals taken for this visit.  Wt:   Wt Readings from Last 2 Encounters:   12/23/24 67.6 kg (149 lb)   12/09/24 64.2 kg (141 lb 8 oz)      Eyes: Sclera anicteric/injected  Ears/nose/mouth/throat: Normal oropharynx without ulcers or exudate, mucus membranes moist, hearing intact  Neck: supple, thyroid normal size  CV: No edema  Respiratory: Unlabored breathing  Lymph: No axillary, submandibular, supraclavicular or inguinal lymphadenopathy  Abd:  Nondistended, +bs, no hepatosplenomegaly, nontender, no peritoneal signs; well healed surgical scar  Skin: warm, perfused, no jaundice  Psych: Normal affect  MSK: Normal gait      PERTINENT STUDIES:  Most recent CBC:  Recent Labs   Lab Test 02/04/25  0920 12/09/24  0628   WBC 4.7 10.0   HGB 13.6 13.7   HCT 40.9 40.7    314     Most recent hepatic panel:  Recent Labs   Lab Test 02/04/25  0920 11/21/24  0926   ALT 14 13   AST 21 22     Most recent creatinine:  Recent Labs   Lab Test 12/09/24  0628 12/08/24  0500   CR 0.89 0.92             Again, thank you for allowing me to  participate in the care of your patient.        Sincerely,        Lazaro Leo MD    Electronically signed

## 2025-03-05 ENCOUNTER — HOME INFUSION (OUTPATIENT)
Dept: HOME HEALTH SERVICES | Facility: HOME HEALTH | Age: 40
End: 2025-03-05
Payer: COMMERCIAL

## 2025-03-05 DIAGNOSIS — K50.00 CROHN'S DISEASE OF SMALL INTESTINE WITHOUT COMPLICATION (H): ICD-10-CM

## 2025-03-11 ENCOUNTER — MYC MEDICAL ADVICE (OUTPATIENT)
Dept: GASTROENTEROLOGY | Facility: CLINIC | Age: 40
End: 2025-03-11
Payer: COMMERCIAL

## 2025-03-11 DIAGNOSIS — K50.018 CROHN'S DISEASE OF ILEUM WITH OTHER COMPLICATION (H): Primary | ICD-10-CM

## 2025-03-11 NOTE — TELEPHONE ENCOUNTER
Therapy plan orders ; plan has been updated. Patient remains on the same medication regimen of Remicade 10mg/kg Q6w. Standing lab orders placed. Hepatitis B indicated immunity on 5/3/23. Quant Gold TB negative 24; New order placed. Last office visit was 25 with Dr. Leo. Next office visit is scheduled for 25 with Willie Lowe. Patient receives infusions at [facility]. [facility] Romero.

## 2025-03-12 ENCOUNTER — TELEPHONE (OUTPATIENT)
Dept: GASTROENTEROLOGY | Facility: CLINIC | Age: 40
End: 2025-03-12
Payer: COMMERCIAL

## 2025-03-12 RX ORDER — SODIUM CHLORIDE 9 MG/ML
500 INJECTION, SOLUTION INTRAVENOUS PRN
Qty: 999999 ML | Refills: 0 | Status: ACTIVE | OUTPATIENT
Start: 2025-03-12 | End: 2026-03-11

## 2025-03-12 RX ORDER — WATER 10 ML/10ML
70 INJECTION INTRAMUSCULAR; INTRAVENOUS; SUBCUTANEOUS
Qty: 560 ML | Refills: 0 | Status: DISPENSED | OUTPATIENT
Start: 2025-03-12 | End: 2026-03-11

## 2025-03-12 RX ORDER — DIPHENHYDRAMINE HYDROCHLORIDE 50 MG/ML
50 INJECTION, SOLUTION INTRAMUSCULAR; INTRAVENOUS PRN
Qty: 99999 ML | Refills: 0 | Status: ACTIVE | OUTPATIENT
Start: 2025-03-12 | End: 2026-03-11

## 2025-03-12 RX ORDER — EPINEPHRINE 0.3 MG/.3ML
0.3 INJECTION SUBCUTANEOUS PRN
Qty: 999999 ML | Refills: 0 | Status: ACTIVE | OUTPATIENT
Start: 2025-03-12 | End: 2026-03-11

## 2025-03-12 RX ORDER — INFLIXIMAB 100 MG/10ML
700 INJECTION, POWDER, LYOPHILIZED, FOR SOLUTION INTRAVENOUS
Qty: 56 EACH | Refills: 0 | Status: DISPENSED | OUTPATIENT
Start: 2025-03-12 | End: 2026-03-11

## 2025-03-12 NOTE — TELEPHONE ENCOUNTER
"Endoscopy Scheduling Screen    Have you had any respiratory illness or flu-like symptoms in the last 10 days?  No    What is your communication preference for Instructions and/or Bowel Prep?   MyChart    What insurance is in the chart?  Other:  [employer]    Ordering/Referring Provider: GUIDO   (If ordering provider performs procedure, schedule with ordering provider unless otherwise instructed. )    BMI: Estimated body mass index is 20.34 kg/m  as calculated from the following:    Height as of 2/6/25: 1.829 m (6').    Weight as of 2/6/25: 68 kg (150 lb).     Sedation Ordered  MAC/deep sedation.   BMI<= 45 45 < BMI <= 48 48 < BMI < = 50  BMI > 50   No Restrictions No [facility]  No [facility]  [facility] with exceptions Hospital Only OR Only       Do you have a history of malignant hyperthermia?  No    (Females) Are you currently pregnant?        Have you been diagnosed or told you have pulmonary hypertension?   No    Do you have an LVAD?  No    Have you been told you have moderate to severe sleep apnea?  No.    Have you been told you have COPD, asthma, or any other lung disease?  No    Has your doctor ordered any cardiac tests like echo, angiogram, stress test, ablation, or EKG, that you have not completed yet?  No    Do you  have a history of any heart conditions?  No     Have you ever had or are you waiting for an organ transplant?  No. Continue scheduling, no site restrictions.    Have you had a stroke or transient ischemic attack (TIA aka \"mini stroke\") in the last 2 years?   No.    Have you been diagnosed with or been told you have cirrhosis of the liver?   No.    Are you currently on dialysis?   No    Do you need assistance transferring?   No    BMI: Estimated body mass index is 20.34 kg/m  as calculated from the following:    Height as of 2/6/25: 1.829 m (6').    Weight as of 2/6/25: 68 kg (150 lb).     Is patients BMI > 40 and scheduling location [facility]?  No    Do you take an injectable or oral medication for " weight loss or diabetes (excluding insulin)?  No    Do you take the medication Naltrexone?  No    Do you take blood thinners?  No       Prep   Are you currently on dialysis or do you have chronic kidney disease?  No    Do you have a diagnosis of diabetes?  No    Do you have a diagnosis of cystic fibrosis (CF)?  No    On a regular basis do you go 3 -5 days between bowel movements?  No    BMI > 40?  No    Preferred Pharmacy:    [address] [phone] [[id number]]       Final Scheduling Details     Procedure scheduled  Colonoscopy    Surgeon:  GUIDO     Date of procedure:  7/22/25 (1st available, due around 6/6)     Pre-OP / PAC:   No - Not required for this site.    Location  [facility] - Per order.    Sedation   MAC/Deep Sedation - Per order.      Patient Reminders:   You will receive a call from a Nurse to review instructions and health history.  This assessment must be completed prior to your procedure.  Failure to complete the Nurse assessment may result in the procedure being cancelled.      On the day of your procedure, please designate an adult(s) who can drive you home stay with you for the next 24 hours. The medicines used in the exam will make you sleepy. You will not be able to drive.      You cannot take public transportation, ride share services, or non-medical taxi service without a responsible caregiver.  Medical transport services are allowed with the requirement that a responsible caregiver will receive you at your destination.  We require that drivers and caregivers are confirmed prior to your procedure.

## 2025-03-13 ENCOUNTER — CARE COORDINATION (OUTPATIENT)
Dept: HOME HEALTH SERVICES | Facility: HOME HEALTH | Age: 40
End: 2025-03-13
Payer: COMMERCIAL

## 2025-03-13 NOTE — PROGRESS NOTES
Med list reviewed. Duplicate meds and meds noted as longer taking were deleted from med list. Annetta OBANDO RN, BSN

## 2025-03-15 ENCOUNTER — HEALTH MAINTENANCE LETTER (OUTPATIENT)
Age: 40
End: 2025-03-15

## 2025-03-17 ENCOUNTER — HOME INFUSION BILLING (OUTPATIENT)
Dept: HOME HEALTH SERVICES | Facility: HOME HEALTH | Age: 40
End: 2025-03-17
Payer: COMMERCIAL

## 2025-03-17 PROCEDURE — A4213 20+ CC SYRINGE ONLY: HCPCS

## 2025-03-17 PROCEDURE — A4245 ALCOHOL WIPES PER BOX: HCPCS

## 2025-03-17 PROCEDURE — S1015 IV TUBING EXTENSION SET: HCPCS

## 2025-03-17 PROCEDURE — A4215 STERILE NEEDLE: HCPCS

## 2025-03-17 PROCEDURE — A4930 STERILE, GLOVES PER PAIR: HCPCS

## 2025-03-18 ENCOUNTER — HOME CARE VISIT (OUTPATIENT)
Dept: HOME HEALTH SERVICES | Facility: HOME HEALTH | Age: 40
End: 2025-03-18
Payer: COMMERCIAL

## 2025-03-18 VITALS
RESPIRATION RATE: 16 BRPM | SYSTOLIC BLOOD PRESSURE: 116 MMHG | HEART RATE: 78 BPM | DIASTOLIC BLOOD PRESSURE: 70 MMHG | OXYGEN SATURATION: 99 % | TEMPERATURE: 98.1 F | BODY MASS INDEX: 20.34 KG/M2 | WEIGHT: 150 LBS

## 2025-03-18 PROCEDURE — S9359 HIT ANTI-TNF PER DIEM: HCPCS

## 2025-03-18 PROCEDURE — A4217 STERILE WATER/SALINE, 500 ML: HCPCS | Mod: JZ

## 2025-03-18 PROCEDURE — 99601 HOME NFS VISIT <2 HRS: CPT

## 2025-03-18 NOTE — PROGRESS NOTES
Nursing Visit Note:  Nurse visit today for PIV, Remicade 700mg infusion for Karlos Terrazas.     present during visit today: Not Applicable.    Intravenous Access:  Peripheral IV placed.    Infusion Nursing Note:    Pre-infusion Checklist:   Have you had any delayed reaction since last infusion?   No     Have you recently had an elevated temperature, fever, chills productive cough, coughing for 3 weeks or longer or hemoptysis, abnormal vital signs, night sweats, chest pain, or have you noticed a decrease in your appetite, or noted unexplained weight loss or fatigue?   No     Do you have any open wounds or new incisions?  No     Do you have any recent or upcoming hospitalizations, surgeries, or dental procedures? Does not include esophagogastroduodenoscopy, colonoscopy, endoscopic retrograde cholangiopancreatography (ERCP), endoscopic ultrasound (EUS), dental procedures or joint aspiration/steroid injections.   No     Do you currently have or recently have had any signs of illness or infection or are you on any antibiotics?   No     Have you had any new, sudden, or worsening abdominal pain?   No     Have you or anyone in your household received a live vaccination in the past 4 weeks?   No     Have you recently been diagnosed with any new nervous system diseases or cancer diagnosis? (i.e., Multiple Sclerosis, Guillain [place], seizures, neurological changes) Are you receiving any form of radiation or chemotherapy?   No     Are you pregnant or breastfeeding, or do you have plans of pregnancy in the future?   No     Have you been having any signs of worsening depression or suicidal ideation?  No     Have you had any other new onset medical symptoms?  No    Entyvio/Ocrevus/Tyasabri only: Have you been having any new or worsening medical problems such as issues with thinking, eyesight, balance or strength that have persisted over several days?   N/A    Benlysta only: Have you been having any signs of worsening  "depression or suicidal ideations?    N/A    IVIG only: Have you had any new blood clots?  N/A    Did the patient answer \"YES\" to any of the questions above?  No     Will the patient receive a medication that has an order for infusion reaction management?  Yes, and all drugs and supplies are available and none have .     If ordered, has the patient taken pre-medications?  No    Plan:   Therapy is appropriate, will proceed with treatment.     Post Infusion Assessment:  Patient tolerated infusion without incident.     Note: Pt is doing well this morning. Denies any pain or new medical symptoms or concerns. State stools have been nonbloody and that Remicade 700mg q 6 weeks has been managing Crohn s symptoms. Pt states he has a f/u colonoscopy in July following his surgery in December.  Writer placed PIV. Pt tolerated Remicade infusion over 1 hour. Flushed PIV and removed post infusion. Will continue pt s plan of care.     Saline administered: 50 (ml)    Supply Check:   Does the patient have all the supplies they need for the next visit?  Yes    Next visit plan: 25 for PIV, labs, Remicade infusion.     Jennifer Steven, RN 3/18/2025  "

## 2025-03-25 ENCOUNTER — LAB (OUTPATIENT)
Dept: LAB | Facility: CLINIC | Age: 40
End: 2025-03-25
Payer: COMMERCIAL

## 2025-03-25 DIAGNOSIS — K50.018 CROHN'S DISEASE OF ILEUM WITH OTHER COMPLICATION (H): ICD-10-CM

## 2025-03-25 PROCEDURE — 99000 SPECIMEN HANDLING OFFICE-LAB: CPT | Performed by: PATHOLOGY

## 2025-03-25 PROCEDURE — 36415 COLL VENOUS BLD VENIPUNCTURE: CPT | Performed by: PATHOLOGY

## 2025-03-25 PROCEDURE — 86481 TB AG RESPONSE T-CELL SUSP: CPT | Performed by: INTERNAL MEDICINE

## 2025-03-26 LAB
GAMMA INTERFERON BACKGROUND BLD IA-ACNC: 0.13 IU/ML
M TB IFN-G BLD-IMP: NEGATIVE
M TB IFN-G CD4+ BCKGRND COR BLD-ACNC: 9.87 IU/ML
MITOGEN IGNF BCKGRD COR BLD-ACNC: 0.03 IU/ML
MITOGEN IGNF BCKGRD COR BLD-ACNC: 0.07 IU/ML
QUANTIFERON MITOGEN: 10 IU/ML
QUANTIFERON NIL TUBE: 0.13 IU/ML
QUANTIFERON TB1 TUBE: 0.2 IU/ML
QUANTIFERON TB2 TUBE: 0.16

## 2025-04-22 SDOH — HEALTH STABILITY: PHYSICAL HEALTH: ON AVERAGE, HOW MANY DAYS PER WEEK DO YOU ENGAGE IN MODERATE TO STRENUOUS EXERCISE (LIKE A BRISK WALK)?: 4 DAYS

## 2025-04-22 SDOH — HEALTH STABILITY: PHYSICAL HEALTH: ON AVERAGE, HOW MANY MINUTES DO YOU ENGAGE IN EXERCISE AT THIS LEVEL?: 40 MIN

## 2025-04-22 ASSESSMENT — SOCIAL DETERMINANTS OF HEALTH (SDOH): HOW OFTEN DO YOU GET TOGETHER WITH FRIENDS OR RELATIVES?: ONCE A WEEK

## 2025-04-23 ENCOUNTER — OFFICE VISIT (OUTPATIENT)
Dept: FAMILY MEDICINE | Facility: CLINIC | Age: 40
End: 2025-04-23
Attending: INTERNAL MEDICINE
Payer: COMMERCIAL

## 2025-04-23 VITALS
DIASTOLIC BLOOD PRESSURE: 85 MMHG | TEMPERATURE: 97.9 F | WEIGHT: 154 LBS | RESPIRATION RATE: 22 BRPM | OXYGEN SATURATION: 100 % | SYSTOLIC BLOOD PRESSURE: 126 MMHG | BODY MASS INDEX: 20.86 KG/M2 | HEART RATE: 72 BPM | HEIGHT: 72 IN

## 2025-04-23 DIAGNOSIS — Z00.00 ROUTINE GENERAL MEDICAL EXAMINATION AT A HEALTH CARE FACILITY: Primary | ICD-10-CM

## 2025-04-23 DIAGNOSIS — J30.1 SEASONAL ALLERGIC RHINITIS DUE TO POLLEN: ICD-10-CM

## 2025-04-23 DIAGNOSIS — K50.00 CROHN'S DISEASE OF SMALL INTESTINE WITHOUT COMPLICATION (H): ICD-10-CM

## 2025-04-23 PROCEDURE — 90480 ADMN SARSCOV2 VAC 1/ONLY CMP: CPT | Performed by: INTERNAL MEDICINE

## 2025-04-23 PROCEDURE — 91320 SARSCV2 VAC 30MCG TRS-SUC IM: CPT | Performed by: INTERNAL MEDICINE

## 2025-04-23 PROCEDURE — 99395 PREV VISIT EST AGE 18-39: CPT | Performed by: INTERNAL MEDICINE

## 2025-04-23 RX ORDER — AZELASTINE 1 MG/ML
1 SPRAY, METERED NASAL 2 TIMES DAILY
Qty: 30 ML | Refills: 4 | Status: SHIPPED | OUTPATIENT
Start: 2025-04-23

## 2025-04-23 RX ORDER — CETIRIZINE HYDROCHLORIDE 10 MG/1
10 TABLET ORAL DAILY
COMMUNITY
Start: 2024-04-01

## 2025-04-23 NOTE — PROGRESS NOTES
Preventive Care Visit  [facility]  Mary Velasquez DO, Internal Medicine  Apr 23, 2025      Assessment & Plan     (Z00.00) Routine general medical examination at a health care facility  (primary encounter diagnosis)  Comment:   Plan: Lipid panel reflex to direct LDL Non-fasting,         Lipoprotein (a)  Colon: GI colon cancer screening  Immunizations: UTD, immunocomrpomised  Labs: Lipids- good last year- but mom now with history of significant hyperlipidemia- her side with heart disease  Discussed healthy lifestyle and aging recommendations including regular exercise, adequate and regular sleep, 5+ fruits and veggies daily.    (J30.1) Seasonal allergic rhinitis due to pollen  Comment: See pt instructions.  Plan: azelastine (ASTELIN) 0.1 % nasal spray     (K50.00) Crohn's disease of small intestine without complication (H)  Comment: s/p Laparoscopic ileocecectomy with KONO-S anastomosis 12/2024  - Noted, on Remicade- managed by GI;     Patient has been advised of split billing requirements and indicates understanding: Yes        Counseling  Appropriate preventive services were addressed with this patient via screening, questionnaire, or discussion as appropriate for fall prevention, nutrition, physical activity, Tobacco-use cessation, social engagement, weight loss and cognition.  Checklist reviewing preventive services available has been given to the patient.  Reviewed patient's diet, addressing concerns and/or questions.           Jyoti Everett is a 39 year old, presenting for the following:  Physical        4/23/2025     2:29 PM   Additional Questions   Roomed by devyn   Accompanied by self          HPI    Here preventive visit.    12/5/24-12/9/24- hospitalized for Laparoscopic ileocecectomy with KONO-S anastomosis   2/6/25- Dr. Leo GI- start fiber for ongoing loose stools, colonoscopy ~5/2025           Advance Care Planning    Discussed advance care planning with  patient; however, patient declined at this time.        4/22/2025   General Health   How would you rate your overall physical health? Good   Feel stress (tense, anxious, or unable to sleep) Only a little   (!) STRESS CONCERN      4/22/2025   Nutrition   Three or more servings of calcium each day? Yes   Diet: Regular (no restrictions)   How many servings of fruit and vegetables per day? 4 or more   How many sweetened beverages each day? 0-1         4/22/2025   Exercise   Days per week of moderate/strenous exercise 4 days   Average minutes spent exercising at this level 40 min         4/22/2025   Social Factors   Frequency of gathering with friends or relatives Once a week   Worry food won't last until get money to buy more No   Food not last or not have enough money for food? No   Do you have housing? (Housing is defined as stable permanent housing and does not include staying outside in a car, in a tent, in an abandoned building, in an overnight shelter, or couch-surfing.) No   Are you worried about losing your housing? No   Lack of transportation? No   Unable to get utilities (heat,electricity)? No   Want help with housing or utility concern? No   (!) HOUSING CONCERN PRESENT      4/22/2025   Dental   Dentist two times every year? Yes           Today's PHQ-2 Score:       1/26/2025     3:07 PM   PHQ-2 ( 1999 [employer])   Q1: Little interest or pleasure in doing things 0   Q2: Feeling down, depressed or hopeless 0   PHQ-2 Score 0    Q1: Little interest or pleasure in doing things Not at all   Q2: Feeling down, depressed or hopeless Not at all   PHQ-2 Score 0       Patient-reported         4/22/2025   Substance Use   Alcohol more than 3/day or more than 7/wk No   Do you use any other substances recreationally? (!) CANNABIS PRODUCTS     Social History     Tobacco Use    Smoking status: Never    Smokeless tobacco: Never   Vaping Use    Vaping status: Never Used   Substance Use Topics    Alcohol use: Not Currently      "Comment: 0.5 drinks per week    Drug use: Yes     Types: Marijuana     Comment: Occasional           4/22/2025   STI Screening   New sexual partner(s) since last STI/HIV test? No         4/22/2025   Contraception/Family Planning   Questions about contraception or family planning No        Reviewed and updated as needed this visit by Provider        Surg Hx  Fam Hx            Past Medical History:   Diagnosis Date    Anemia 2/1/2023    Crohn's disease    Crohn's disease of small intestine without complication (H)      Past Surgical History:   Procedure Laterality Date    COLONOSCOPY N/A 04/07/2023    Procedure: COLONOSCOPY, WITH BIOPSY;  Surgeon: Lamont Seaman MD;  Location: [facility] OR    LAPAROSCOPIC APPENDECTOMY N/A 02/22/2023    STILL HAS APPENDIX - actually had a diagnostic laparoscopy and Crohn's was diagnosed    LAPAROSCOPIC RESECTION ILEOCOLIC N/A 12/5/2024    Procedure: Laparoscopic ileocecectomy with KONO-S anastomosis;  Surgeon: Talat Osorio MD;  Location:  OR    [facility]  1/1/2019     Lab work is in process         Objective    Exam  /85   Pulse 72   Temp 97.9  F (36.6  C) (Temporal)   Resp 22   Ht 1.83 m (6' 0.05\")   Wt 69.9 kg (154 lb)   SpO2 100%   BMI 20.86 kg/m     Estimated body mass index is 20.86 kg/m  as calculated from the following:    Height as of this encounter: 1.83 m (6' 0.05\").    Weight as of this encounter: 69.9 kg (154 lb).    Physical Exam  GENERAL: alert and no distress  EYES: Eyes grossly normal to inspection, PERRL and conjunctivae and sclerae normal  HENT: ear canals and TM's normal, nose and mouth without ulcers or lesions  NECK: no adenopathy, no asymmetry, masses, or scars  RESP: lungs clear to auscultation - no rales, rhonchi or wheezes  CV: regular rate and rhythm, normal S1 S2, no S3 or S4, no murmur, click or rub, no peripheral edema  MS: no gross musculoskeletal defects noted, no edema  SKIN: no suspicious lesions or rashes  NEURO: Normal strength and " tone, mentation intact and speech normal  PSYCH: mentation appears normal, affect normal/bright        Signed Electronically by: Mary Velasquez, DO

## 2025-04-23 NOTE — PATIENT INSTRUCTIONS
For allergies, use eye drops (Patanol) or nasal spray (Astelin can be used as needed and Flonase needs to be taken regularly for at least 2 weeks to have maximum benefit).    Then, can try increasing Zyrtec to twice daily if not helping enough; if still not helping- message me and I'll refer to allergy.      Technique on nasal sprays:    How to spray your nasal spray:    Tilt your head forward. Spray the nasal spray up your nose and tilt the spray towards your ears. Spray 2 sprays per nostril. Inhale gently. Dab excess nasal spray with tissue. Remain upright for at least 1 hour. Use nasal spray once daily.    You should never taste the nasal spray dripping down your throat. If you do, rinse out your mouth well and try tilting your head more forward the next time you use your spray.    Some people find it easier to spray 1 spray per nostril twice daily.    Recommend spraying your nasal spray straight into your nose (nose to toes), angle out towards your ears to avoid hitting the septum, breathe in while you spray.    Flonase is a steroid nasal spray that works as an anti-inflammatory to open up the nasal passages and decrease the amount of drainage from your nose and sinuses.  It is only effective when used consistently.  It may take 2-3 weeks of consistent use to reach it optimal effect.      Patient Education   Preventive Care Advice   This is general advice given by our system to help you stay healthy. However, your care team may have specific advice just for you. Please talk to your care team about your preventive care needs.  Nutrition  Eat 5 or more servings of fruits and vegetables each day.  Try wheat bread, brown rice and whole grain pasta (instead of white bread, rice, and pasta).  Get enough calcium and vitamin D. Check the label on foods and aim for 100% of the RDA (recommended daily allowance).  Lifestyle  Exercise at least 150 minutes each week  (30 minutes a day, 5 days a week).  Do muscle  strengthening activities 2 days a week. These help control your weight and prevent disease.  No smoking.  Wear sunscreen to prevent skin cancer.  Have a dental exam and cleaning every 6 months.  Yearly exams  See your health care team every year to talk about:  Any changes in your health.  Any medicines your care team has prescribed.  Preventive care, family planning, and ways to prevent chronic diseases.  Shots (vaccines)   HPV shots (up to age 26), if you've never had them before.  Hepatitis B shots (up to age 59), if you've never had them before.  COVID-19 shot: Get this shot when it's due.  Flu shot: Get a flu shot every year.  Tetanus shot: Get a tetanus shot every 10 years.  Pneumococcal, hepatitis A, and RSV shots: Ask your care team if you need these based on your risk.  Shingles shot (for age 50 and up)  General health tests  Diabetes screening:  Starting at age 35, Get screened for diabetes at least every 3 years.  If you are younger than age 35, ask your care team if you should be screened for diabetes.  Cholesterol test: At age 39, start having a cholesterol test every 5 years, or more often if advised.  Bone density scan (DEXA): At age 50, ask your care team if you should have this scan for osteoporosis (brittle bones).  Hepatitis C: Get tested at least once in your life.  STIs (sexually transmitted infections)  Before age 24: Ask your care team if you should be screened for STIs.  After age 24: Get screened for STIs if you're at risk. You are at risk for STIs (including HIV) if:  You are sexually active with more than one person.  You don't use condoms every time.  You or a partner was diagnosed with a sexually transmitted infection.  If you are at risk for HIV, ask about PrEP medicine to prevent HIV.  Get tested for HIV at least once in your life, whether you are at risk for HIV or not.  Cancer screening tests  Cervical cancer screening: If you have a cervix, begin getting regular cervical cancer  screening tests starting at age 21.  Breast cancer scan (mammogram): If you've ever had breasts, begin having regular mammograms starting at age 40. This is a scan to check for breast cancer.  Colon cancer screening: It is important to start screening for colon cancer at age 45.  Have a colonoscopy test every 10 years (or more often if you're at risk) Or, ask your provider about stool tests like a FIT test every year or Cologuard test every 3 years.  To learn more about your testing options, visit:   .  For help making a decision, visit:   https://bit.ly/ap05963.  Prostate cancer screening test: If you have a prostate, ask your care team if a prostate cancer screening test (PSA) at age 55 is right for you.  Lung cancer screening: If you are a current or former smoker ages 50 to 80, ask your care team if ongoing lung cancer screenings are right for you.  For informational purposes only. Not to replace the advice of your health care provider. Copyright   2023 [facility]. All rights reserved. Clinically reviewed by the [facility]. [employer] 493440 - REV 01/24.  Substance Use Disorder: Care Instructions  Overview     You can improve your life and health by stopping your use of alcohol or drugs. When you don't drink or use drugs, you may feel and sleep better. You may get along better with your family, friends, and coworkers. There are medicines and programs that can help with substance use disorder.  How can you care for yourself at home?  Here are some ways to help you stay sober and prevent relapse.  If you have been given medicine to help keep you sober or reduce your cravings, be sure to take it exactly as prescribed.  Talk to your doctor about programs that can help you stop using drugs or drinking alcohol.  Do not keep alcohol or drugs in your home.  Plan ahead. Think about what you'll say if other people ask you to drink or use drugs. Try not to spend time with people who  drink or use drugs.  Use the time and money spent on drinking or drugs to do something that's important to you.  Preventing a relapse  Have a plan to deal with relapse. Learn to recognize changes in your thinking that lead you to drink or use drugs. Get help before you start to drink or use drugs again.  Try to stay away from situations, friends, or places that may lead you to drink or use drugs.  If you feel the need to drink alcohol or use drugs again, seek help right away. Call a trusted friend or family member. Some people get support from organizations such as Narcotics Anonymous or [employer] or from treatment facilities.  If you relapse, get help as soon as you can. Some people make a plan with another person that outlines what they want that person to do for them if they relapse. The plan usually includes how to handle the relapse and who to notify in case of relapse.  Don't give up. Remember that a relapse doesn't mean that you have failed. Use the experience to learn the triggers that lead you to drink or use drugs. Then quit again. Recovery is a lifelong process. Many people have several relapses before they are able to quit for good.  Follow-up care is a key part of your treatment and safety. Be sure to make and go to all appointments, and call your doctor if you are having problems. It's also a good idea to know your test results and keep a list of the medicines you take.  When should you call for help?   Call 911  anytime you think you may need emergency care. For example, call if you or someone else:    Has overdosed or has withdrawal signs. Be sure to tell the emergency workers that you are or someone else is using or trying to quit using drugs. Overdose or withdrawal signs may include:  Losing consciousness.  Seizure.  Seeing or hearing things that aren't there (hallucinations).     Is thinking or talking about suicide or harming others.   Where to get help 24 hours a day, 7 days a week   If you  "or someone you know talks about suicide, self-harm, a mental health crisis, a substance use crisis, or any other kind of emotional distress, get help right away. You can:    Call the Suicide and Crisis Lifeline at 988.     Call 1-700-384-SLDE (1-896.750.4426).     Text HOME to 423354 to access the Crisis Text Line.   Consider saving these numbers in your phone.  Go to [facility] for more information or to chat online.  Call your doctor now or seek immediate medical care if:    You are having withdrawal symptoms. These may include nausea or vomiting, sweating, shakiness, and anxiety.   Watch closely for changes in your health, and be sure to contact your doctor if:    You have a relapse.     You need more help or support to stop.   Where can you learn more?  Go to https://www.[employer].net/patiented  Enter H573 in the search box to learn more about \"Substance Use Disorder: Care Instructions.\"  Current as of: August 20, 2024  Content Version: 14.4    4948-5445 [employer].   Care instructions adapted under license by your healthcare professional. If you have questions about a medical condition or this instruction, always ask your healthcare professional. [employer] disclaims any warranty or liability for your use of this information.       "

## 2025-04-24 ENCOUNTER — HOME INFUSION (OUTPATIENT)
Dept: HOME HEALTH SERVICES | Facility: HOME HEALTH | Age: 40
End: 2025-04-24
Payer: COMMERCIAL

## 2025-04-28 ENCOUNTER — HOME INFUSION BILLING (OUTPATIENT)
Dept: HOME HEALTH SERVICES | Facility: HOME HEALTH | Age: 40
End: 2025-04-28
Payer: COMMERCIAL

## 2025-04-28 PROCEDURE — A4213 20+ CC SYRINGE ONLY: HCPCS

## 2025-04-28 PROCEDURE — S1015 IV TUBING EXTENSION SET: HCPCS

## 2025-04-28 PROCEDURE — A4215 STERILE NEEDLE: HCPCS

## 2025-04-29 ENCOUNTER — HOME CARE VISIT (OUTPATIENT)
Dept: HOME HEALTH SERVICES | Facility: HOME HEALTH | Age: 40
End: 2025-04-29
Payer: COMMERCIAL

## 2025-04-29 ENCOUNTER — LAB REQUISITION (OUTPATIENT)
Dept: LAB | Facility: CLINIC | Age: 40
End: 2025-04-29
Payer: COMMERCIAL

## 2025-04-29 VITALS
TEMPERATURE: 98.2 F | RESPIRATION RATE: 16 BRPM | OXYGEN SATURATION: 97 % | DIASTOLIC BLOOD PRESSURE: 78 MMHG | SYSTOLIC BLOOD PRESSURE: 120 MMHG | HEART RATE: 73 BPM | BODY MASS INDEX: 20.86 KG/M2 | WEIGHT: 154 LBS

## 2025-04-29 LAB
ALBUMIN SERPL BCG-MCNC: 4.6 G/DL (ref 3.5–5.2)
ALP SERPL-CCNC: 96 U/L (ref 40–150)
ALT SERPL W P-5'-P-CCNC: 28 U/L (ref 0–70)
AST SERPL W P-5'-P-CCNC: 32 U/L (ref 0–45)
BASOPHILS # BLD AUTO: 0 10E3/UL (ref 0–0.2)
BASOPHILS NFR BLD AUTO: 1 %
BILIRUB DIRECT SERPL-MCNC: 0.17 MG/DL (ref 0–0.3)
BILIRUB SERPL-MCNC: 0.4 MG/DL
CRP SERPL-MCNC: <3 MG/L
EOSINOPHIL # BLD AUTO: 0.1 10E3/UL (ref 0–0.7)
EOSINOPHIL NFR BLD AUTO: 2 %
ERYTHROCYTE [DISTWIDTH] IN BLOOD BY AUTOMATED COUNT: 12.5 % (ref 10–15)
HCT VFR BLD AUTO: 39.3 % (ref 40–53)
HGB BLD-MCNC: 13.4 G/DL (ref 13.3–17.7)
IMM GRANULOCYTES # BLD: 0 10E3/UL
IMM GRANULOCYTES NFR BLD: 0 %
LYMPHOCYTES # BLD AUTO: 2.4 10E3/UL (ref 0.8–5.3)
LYMPHOCYTES NFR BLD AUTO: 39 %
MCH RBC QN AUTO: 29.5 PG (ref 26.5–33)
MCHC RBC AUTO-ENTMCNC: 34.1 G/DL (ref 31.5–36.5)
MCV RBC AUTO: 87 FL (ref 78–100)
MONOCYTES # BLD AUTO: 0.6 10E3/UL (ref 0–1.3)
MONOCYTES NFR BLD AUTO: 10 %
NEUTROPHILS # BLD AUTO: 3 10E3/UL (ref 1.6–8.3)
NEUTROPHILS NFR BLD AUTO: 48 %
NRBC # BLD AUTO: 0 10E3/UL
NRBC BLD AUTO-RTO: 0 /100
PLATELET # BLD AUTO: 286 10E3/UL (ref 150–450)
PROT SERPL-MCNC: 7.5 G/DL (ref 6.4–8.3)
RBC # BLD AUTO: 4.54 10E6/UL (ref 4.4–5.9)
WBC # BLD AUTO: 6.2 10E3/UL (ref 4–11)

## 2025-04-29 PROCEDURE — 85004 AUTOMATED DIFF WBC COUNT: CPT | Performed by: INTERNAL MEDICINE

## 2025-04-29 PROCEDURE — 99601 HOME NFS VISIT <2 HRS: CPT

## 2025-04-29 PROCEDURE — 86140 C-REACTIVE PROTEIN: CPT | Performed by: INTERNAL MEDICINE

## 2025-04-29 PROCEDURE — A4217 STERILE WATER/SALINE, 500 ML: HCPCS | Mod: JZ

## 2025-04-29 PROCEDURE — 80061 LIPID PANEL: CPT | Performed by: INTERNAL MEDICINE

## 2025-04-29 PROCEDURE — 83695 ASSAY OF LIPOPROTEIN(A): CPT | Performed by: INTERNAL MEDICINE

## 2025-04-29 PROCEDURE — 82040 ASSAY OF SERUM ALBUMIN: CPT | Performed by: INTERNAL MEDICINE

## 2025-04-29 PROCEDURE — S9359 HIT ANTI-TNF PER DIEM: HCPCS

## 2025-04-29 NOTE — PROGRESS NOTES
Nursing Visit Note:  Nurse visit today for GA, PIV, labs and Infliximab infusion for Karlos Terrazas.     present during visit today: Not Applicable.    Intravenous Access:  Labs drawn without difficulty. and Peripheral IV placed.    Infusion Nursing Note:    Pre-infusion Checklist:   Have you had any delayed reaction since last infusion?   No     Have you recently had an elevated temperature, fever, chills productive cough, coughing for 3 weeks or longer or hemoptysis, abnormal vital signs, night sweats, chest pain, or have you noticed a decrease in your appetite, or noted unexplained weight loss or fatigue?   No     Do you have any open wounds or new incisions?  No     Do you have any recent or upcoming hospitalizations, surgeries, or dental procedures? Does not include esophagogastroduodenoscopy, colonoscopy, endoscopic retrograde cholangiopancreatography (ERCP), endoscopic ultrasound (EUS), dental procedures or joint aspiration/steroid injections.   No     Do you currently have or recently have had any signs of illness or infection or are you on any antibiotics?   No     Have you had any new, sudden, or worsening abdominal pain?   No     Have you or anyone in your household received a live vaccination in the past 4 weeks?   No     Have you recently been diagnosed with any new nervous system diseases or cancer diagnosis? (i.e., Multiple Sclerosis, Guillain [place], seizures, neurological changes) Are you receiving any form of radiation or chemotherapy?   No     Are you pregnant or breastfeeding, or do you have plans of pregnancy in the future?   No     Have you been having any signs of worsening depression or suicidal ideation?  No     Have you had any other new onset medical symptoms?  No    Entyvio/Ocrevus/Tyasabri only: Have you been having any new or worsening medical problems such as issues with thinking, eyesight, balance or strength that have persisted over several days?   N/A    Benlysta only:  "Have you been having any signs of worsening depression or suicidal ideations?    N/A    IVIG only: Have you had any new blood clots?  N/A    Did the patient answer \"YES\" to any of the questions above?  No     Will the patient receive a medication that has an order for infusion reaction management?  Yes, and all drugs and supplies are available and none have .     If ordered, has the patient taken pre-medications?  N/A    Plan:   Therapy is appropriate, will proceed with treatment.     Post Infusion Assessment:  Patient tolerated infusion without incident.  Blood return noted pre and post infusion.  Site patent and intact, free from redness, edema or discomfort.  No evidence of extravasations.  Access discontinued per protocol.  Biologic Infusion Post Education: Call the triage nurse at your clinic or seek medical attention if you have chills and/or temperature greater than or equal to 100.5, uncontrolled nausea/vomiting, diarrhea, constipation, dizziness, shortness of breath, chest pain, heart palpitations, weakness or any other new or concerning symptoms, questions or concerns.  You cannot have any live virus vaccines prior to or during treatment or up to 6 months post infusion.  If you have an upcoming surgery, medical procedure or dental procedure during treatment, this should be discussed with your ordering physician and your surgeon/dentist.  If you are having any concerning symptom, if you are unsure if you should get your next infusion or wish to speak to a provider before your next infusion, please call your care coordinator or triage nurse at your clinic to notify them so we can adequately serve you.    and Lab-Only Nursing Note    Labs obtained via VPT    Time Specimen drawn: 1515    Last dose (if applicable): No    Facility sent to: [facility] Tracking number: RN dropped off at [facility].    Note: Pt is alert and oriented. VSS. No concerns today.    Saline administered: 40 (ml)    Supply Check: "   Does the patient have all the supplies they need for the next visit?  Yes    Next visit plan: 6/10/25, scheduled in EPIC and confirmed with pt.    Annetta Garcia RN 4/29/2025

## 2025-05-01 LAB
APO A-I SERPL-MCNC: 7 MG/DL
CHOLEST SERPL-MCNC: 159 MG/DL
FASTING STATUS PATIENT QL REPORTED: NO
HDLC SERPL-MCNC: 61 MG/DL
LDLC SERPL CALC-MCNC: 78 MG/DL
NONHDLC SERPL-MCNC: 98 MG/DL
TRIGL SERPL-MCNC: 100 MG/DL

## 2025-05-03 NOTE — RESULT ENCOUNTER NOTE
Patient Education    BRIGHT FUTURES HANDOUT- PARENT  11 THROUGH 14 YEAR VISITS  Here are some suggestions from [facility] experts that may be of value to your family.     HOW YOUR FAMILY IS DOING  Encourage your child to be part of family decisions. Give your child the chance to make more of her own decisions as she grows older.  Encourage your child to think through problems with your support.  Help your child find activities she is really interested in, besides schoolwork.  Help your child find and try activities that help others.  Help your child deal with conflict.  Help your child figure out nonviolent ways to handle anger or fear.  If you are worried about your living or food situation, talk with us. Community agencies and programs such as [employer] can also provide information and assistance.    YOUR GROWING AND CHANGING CHILD  Help your child get to the dentist twice a year.  Give your child a fluoride supplement if the dentist recommends it.  Encourage your child to brush her teeth twice a day and floss once a day.  Praise your child when she does something well, not just when she looks good.  Support a healthy body weight and help your child be a healthy eater.  Provide healthy foods.  Eat together as a family.  Be a role model.  Help your child get enough calcium with low-fat or fat-free milk, low-fat yogurt, and cheese.  Encourage your child to get at least 1 hour of physical activity every day. Make sure she uses helmets and other safety gear.  Consider making a family media use plan. Make rules for media use and balance your child s time for physical activities and other activities.  Check in with your child s teacher about grades. Attend back-to-school events, parent-teacher conferences, and other school activities if possible.  Talk with your child as she takes over responsibility for schoolwork.  Help your child with organizing time, if she needs it.  Encourage daily reading.  YOUR CHILD S  Dear Karlos,    I agree with Dr. Leo- your cholesterol looks excellent- and great news that your Lp(a) is normal.  This is great and means you do not have the more nefarious type of LDL cholesterol.    Kind regards,    Mary Velasquez, DO  Internal Medicine - Pediatrics Physician  [facility]       FEELINGS  Find ways to spend time with your child.  If you are concerned that your child is sad, depressed, nervous, irritable, hopeless, or angry, let us know.  Talk with your child about how his body is changing during puberty.  If you have questions about your child s sexual development, you can always talk with us.    HEALTHY BEHAVIOR CHOICES  Help your child find fun, safe things to do.  Make sure your child knows how you feel about alcohol and drug use.  Know your child s friends and their parents. Be aware of where your child is and what he is doing at all times.  Lock your liquor in a cabinet.  Store prescription medications in a locked cabinet.  Talk with your child about relationships, sex, and values.  If you are uncomfortable talking about puberty or sexual pressures with your child, please ask us or others you trust for reliable information that can help.  Use clear and consistent rules and discipline with your child.  Be a role model.    SAFETY  Make sure everyone always wears a lap and shoulder seat belt in the car.  Provide a properly fitting helmet and safety gear for biking, skating, in-line skating, skiing, snowmobiling, and horseback riding.  Use a hat, sun protection clothing, and sunscreen with SPF of 15 or higher on her exposed skin. Limit time outside when the sun is strongest (11:00 am-3:00 pm).  Don t allow your child to ride ATVs.  Make sure your child knows how to get help if she feels unsafe.  If it is necessary to keep a gun in your home, store it unloaded and locked with the ammunition locked separately from the gun.          Helpful Resources:  Family Media Use Plan: www.healthychildren.org/MediaUsePlan   Consistent with Bright Futures: Guidelines for Health Supervision of Infants, Children, and Adolescents, 4th Edition  For more information, go to https://brightfutures.aap.org.

## 2025-06-05 ENCOUNTER — HOME INFUSION (OUTPATIENT)
Dept: HOME HEALTH SERVICES | Facility: HOME HEALTH | Age: 40
End: 2025-06-05
Payer: COMMERCIAL

## 2025-06-09 ENCOUNTER — HOME INFUSION BILLING (OUTPATIENT)
Dept: HOME HEALTH SERVICES | Facility: HOME HEALTH | Age: 40
End: 2025-06-09
Payer: COMMERCIAL

## 2025-06-09 PROCEDURE — S1015 IV TUBING EXTENSION SET: HCPCS

## 2025-06-09 PROCEDURE — A4215 STERILE NEEDLE: HCPCS

## 2025-06-09 PROCEDURE — A4213 20+ CC SYRINGE ONLY: HCPCS

## 2025-06-10 ENCOUNTER — HOME CARE VISIT (OUTPATIENT)
Dept: HOME HEALTH SERVICES | Facility: HOME HEALTH | Age: 40
End: 2025-06-10
Payer: COMMERCIAL

## 2025-06-10 VITALS
SYSTOLIC BLOOD PRESSURE: 124 MMHG | BODY MASS INDEX: 20.86 KG/M2 | OXYGEN SATURATION: 99 % | RESPIRATION RATE: 16 BRPM | DIASTOLIC BLOOD PRESSURE: 76 MMHG | HEART RATE: 80 BPM | WEIGHT: 154 LBS | TEMPERATURE: 97.7 F

## 2025-06-10 PROCEDURE — 99601 HOME NFS VISIT <2 HRS: CPT

## 2025-06-10 PROCEDURE — A4217 STERILE WATER/SALINE, 500 ML: HCPCS | Mod: JZ

## 2025-06-10 PROCEDURE — S9359 HIT ANTI-TNF PER DIEM: HCPCS

## 2025-06-10 NOTE — PROGRESS NOTES
Nursing Visit Note:  Nurse visit today for GA, PIV Rapid Inflectra for Karlos Terrazas.     present during visit today: Not Applicable.    Intravenous Access:  Peripheral IV placed.    Infusion Nursing Note:    Pre-infusion Checklist:   Have you had any delayed reaction since last infusion?   No     Have you recently had an elevated temperature, fever, chills productive cough, coughing for 3 weeks or longer or hemoptysis, abnormal vital signs, night sweats, chest pain, or have you noticed a decrease in your appetite, or noted unexplained weight loss or fatigue?   No     Do you have any open wounds or new incisions?  No     Do you have any recent or upcoming hospitalizations, surgeries, or dental procedures? Does not include esophagogastroduodenoscopy, colonoscopy, endoscopic retrograde cholangiopancreatography (ERCP), endoscopic ultrasound (EUS), dental procedures or joint aspiration/steroid injections.   No     Do you currently have or recently have had any signs of illness or infection or are you on any antibiotics?   No     Have you had any new, sudden, or worsening abdominal pain?   No     Have you or anyone in your household received a live vaccination in the past 4 weeks?   No     Have you recently been diagnosed with any new nervous system diseases or cancer diagnosis? (i.e., Multiple Sclerosis, Guillain [place], seizures, neurological changes) Are you receiving any form of radiation or chemotherapy?   No     Are you pregnant or breastfeeding, or do you have plans of pregnancy in the future?   No     Have you been having any signs of worsening depression or suicidal ideation?  No     Have you had any other new onset medical symptoms?  No    Entyvio/Ocrevus/Tyasabri only: Have you been having any new or worsening medical problems such as issues with thinking, eyesight, balance or strength that have persisted over several days?   N/A    Benlysta only: Have you been having any signs of worsening  "depression or suicidal ideations?    N/A    IVIG only: Have you had any new blood clots?  N/A    Did the patient answer \"YES\" to any of the questions above?  No     Will the patient receive a medication that has an order for infusion reaction management?  Yes, and all drugs and supplies are available and none have .     If ordered, has the patient taken pre-medications?  N/A    Plan:   Therapy is appropriate, will proceed with treatment.     Post Infusion Assessment:  Patient tolerated infusion without incident.  Blood return noted pre and post infusion.  Site patent and intact, free from redness, edema or discomfort.  No evidence of extravasations.  Access discontinued per protocol.  Biologic Infusion Post Education: Call the triage nurse at your clinic or seek medical attention if you have chills and/or temperature greater than or equal to 100.5, uncontrolled nausea/vomiting, diarrhea, constipation, dizziness, shortness of breath, chest pain, heart palpitations, weakness or any other new or concerning symptoms, questions or concerns.  You cannot have any live virus vaccines prior to or during treatment or up to 6 months post infusion.  If you have an upcoming surgery, medical procedure or dental procedure during treatment, this should be discussed with your ordering physician and your surgeon/dentist.  If you are having any concerning symptom, if you are unsure if you should get your next infusion or wish to speak to a provider before your next infusion, please call your care coordinator or triage nurse at your clinic to notify them so we can adequately serve you.     Note: Pt is alert and oriented. VSS. Feeling even better than last visit. Having about 2 BM s daily. Senies and pain or cramping. No concerns today.     Saline administered: 40 (ml)    Supply Check:   Does the patient have all the supplies they need for the next visit?  Yes    Next visit plan: 25 as he has a colonoscopy on " 7/22/25    Annetta Garcia, RN 6/10/2025

## 2025-06-11 ENCOUNTER — EPISODE UPDATE (OUTPATIENT)
Dept: HOME HEALTH SERVICES | Facility: HOME HEALTH | Age: 40
End: 2025-06-11
Payer: COMMERCIAL

## 2025-07-03 ENCOUNTER — TELEPHONE (OUTPATIENT)
Dept: GASTROENTEROLOGY | Facility: CLINIC | Age: 40
End: 2025-07-03
Payer: COMMERCIAL

## 2025-07-03 NOTE — TELEPHONE ENCOUNTER
Pre visit planning completed.      Procedure details:    Patient scheduled for Colonoscopy on 7/22/25.     Arrival time: 0845. Procedure time 0945    Facility location: [facility]; [address] [place] [address], [address] Floor, [place] [address]. Check in location: 5th Floor.    Sedation type: MAC    Pre op exam needed? No.    Indication for procedure:   Crohn's disease of ileum with other complication (H) [K50.018]  - Primary      Immunosuppression [D84.9]            Chart review:     Electronic implanted devices? No    Recent diagnosis of diverticulitis within the last 6 weeks? No      Medication review:    Diabetic? No    Anticoagulants? No    Weight loss medication/injectable? No GLP-1 medication per patient's medication list. Nursing to verify with pre-assessment call.    Other medication HOLDING recommendations:  Ferrous sulfate (iron supplements): HOLD 7 days before procedure.      Prep for procedure:     Bowel prep recommendation: Standard Miralax.   Due to: standard bowel prep    Procedure information and instructions sent via [employer]         Mary Kay Lantigua RN  Endoscopy Procedure Pre Assessment   [phone] option 3

## 2025-07-03 NOTE — TELEPHONE ENCOUNTER
Attempted to contact patient in order to complete pre assessment questions.     No answer. Left message to return call to [phone] option 3.    Callback communication sent via [employer].    Janny Chamberlain RN

## 2025-07-03 NOTE — TELEPHONE ENCOUNTER
Bowel Prep Review:  Disclaimer: No call was made to the patient.     Standard Miralax bowel prep.    Recommended due to standard bowel prep.   Instructions were sent via alejandra Carrion LPN  Endoscopy Procedure Pre Assessment

## 2025-07-05 ENCOUNTER — MYC MEDICAL ADVICE (OUTPATIENT)
Dept: FAMILY MEDICINE | Facility: CLINIC | Age: 40
End: 2025-07-05
Payer: COMMERCIAL

## 2025-07-08 ENCOUNTER — NURSE TRIAGE (OUTPATIENT)
Dept: FAMILY MEDICINE | Facility: CLINIC | Age: 40
End: 2025-07-08
Payer: COMMERCIAL

## 2025-07-08 NOTE — TELEPHONE ENCOUNTER
"Care advice: See in office within 3 days    Disposition: Patient agreed to an appt scheduled for 7/10.  Date, time, provider to be seen and location confirmed.      Reason for Disposition   Small swelling or lump present > 1 week    Additional Information   Negative: Sounds like a life-threatening emergency to the triager   Negative: Small growth, spot, bump, or pigmented area of skin (e.g., moles, skin tags, wart, melanoma, skin cancer)   Negative: Inguinal hernia previously diagnosed by a doctor (or NP/PA)   Negative: Followed a skin injury   Negative: Followed an insect bite and has localized swelling (e.g., small area of puffy or swollen skin)   Negative: Swelling of ankle joint   Negative: Swelling of entire face   Negative: Swelling of eyelid   Negative: Swelling of knee joint   Negative: Swelling of labia   Negative: Swelling of lymph node suspected   Negative: Swelling of rectum   Negative: Swelling of scrotum   Negative: Swelling of surgical incision   Negative: Swelling of vaccination site   Negative: Hernia suspected (bulge in groin or abdomen) and painful or vomiting   Negative: Swollen lump in groin and pulsating (like heartbeat)   Negative: Patient sounds very sick or weak to the triager   Negative: SEVERE pain (e.g., excruciating)   Negative: Swelling is painful to touch and fever   Negative: Swelling is red and fever   Negative: Swelling is red and size > 2 inches (5.0 cm)   Negative: Swelling is painful to touch and no fever   Negative: Looks like a boil, infected sore, deep ulcer or other infected rash    Answer Assessment - Initial Assessment Questions  1. APPEARANCE of SWELLING: \"What does it look like?\"      Small portion of skin tissue sticking out at surgical site (blueberry size) at surgical site from \"hernia at the site of my incision from my surgery last December. It is quite small (blueberry?) and causes no pain.\"  From patient MyChart message.  Surgery was from small bowel resection, " "12/2024.    2. SIZE: \"How large is the swelling?\" (e.g., inches, cm; or compare to size of pinhead, tip of pen, eraser, coin, pea, grape, ping pong ball)       Blueberry size.  Lump is soft.  Can push back in but then retracts back.    3. LOCATION: \"Where is the swelling located?\"      Above bellybutton at approximately an inch or more.      4. ONSET: \"When did the swelling start?\"      Last week.    5. COLOR: \"What color is it?\" \"Is there more than one color?\"      Does not look any different than normal skin color.    6. PAIN: \"Is there any pain?\" If Yes, ask: \"How bad is the pain?\" (Scale 1-10; or mild, moderate, severe)        Denied.    7. ITCH: \"Does it itch?\" If Yes, ask: \"How bad is the itch?\"       Denied.    8. CAUSE: \"What do you think caused the swelling?\"      Extra skin tissue (?)  Unknown.      9 OTHER SYMPTOMS: \"Do you have any other symptoms?\" (e.g., fever)      Denied.    Protocols used: Skin Lump or Localized Swelling-A-OH    "

## 2025-07-08 NOTE — TELEPHONE ENCOUNTER
Writer replied to patient via [employer].  GENA Sousa BSN, PHN, AMB-BC (she/her)  [facility] RN

## 2025-07-09 ENCOUNTER — MYC MEDICAL ADVICE (OUTPATIENT)
Dept: FAMILY MEDICINE | Facility: CLINIC | Age: 40
End: 2025-07-09
Payer: COMMERCIAL

## 2025-07-09 ENCOUNTER — VIRTUAL VISIT (OUTPATIENT)
Dept: PHARMACY | Facility: CLINIC | Age: 40
End: 2025-07-09
Attending: PHARMACIST
Payer: COMMERCIAL

## 2025-07-09 VITALS — HEIGHT: 72 IN | WEIGHT: 150 LBS | BODY MASS INDEX: 20.32 KG/M2

## 2025-07-09 DIAGNOSIS — J30.2 SEASONAL ALLERGIC RHINITIS, UNSPECIFIED TRIGGER: ICD-10-CM

## 2025-07-09 DIAGNOSIS — K50.919 CROHN'S DISEASE WITH COMPLICATION, UNSPECIFIED GASTROINTESTINAL TRACT LOCATION (H): Primary | ICD-10-CM

## 2025-07-09 ASSESSMENT — PAIN SCALES - GENERAL: PAINLEVEL_OUTOF10: NO PAIN (0)

## 2025-07-09 NOTE — NURSING NOTE
Current patient location: [address] [place] [id number]    Is the patient currently in the state of [place]? YES    Visit mode: TELEPHONE    If the visit is dropped, the patient can be reconnected by:TELEPHONE VISIT: Phone number:   Telephone Information:   Mobile [phone]       Will anyone else be joining the visit? NO  (If patient encounters technical issues they should call [phone])    Are changes needed to the allergy or medication list? No    Are refills needed on medications prescribed by this physician? NO    Rooming Documentation:  Questionnaire(s) not done per department protocol    Reason for visit: ALVARO BROWN

## 2025-07-09 NOTE — PROGRESS NOTES
Medication Therapy Management (MTM) Encounter    ASSESSMENT:                            Medication Adherence/Access: No issues identified.    Crohn's Disease: Karlos would benefit from continuing on infliximab 10 mg/kg IV every six weeks as maintenance therapy. IBD provider follow-up in place. Reviewed IBD health maintenance today as noted below. This is largely up-to-date, but we discussed completion of the hepatitis A series since he started this in 2024. He is seeing primary care tomorrow and will plan to discuss at that visit. Encouraged him to let us know if he needs anything from us in that regard.     Allergies: Stable.    PLAN:                            Continue infliximab 10 mg/kg IV every six weeks.    Reminder to complete the hepatitis A series when able.    Follow-up:    -- 6 months for MTM, sooner if needed    SUBJECTIVE/OBJECTIVE:                          Karlos Terrazas is a 39 year old male seen for a follow-up visit.       Reason for visit: infliximab check-in    Allergies/ADRs: Reviewed in chart  Tobacco: He reports that he has never smoked. He has never used smokeless tobacco.  Alcohol: Less than 1 beverages / week    Medication Adherence/Access: no issues reported.  -- currently working with [facility]    Crohn's Disease  Infliximab 10 mg/kg every six weeks  Ferrous bisglycinate 1 tablet three times weekly  Ca/D daily     No major concerns reported. Continues to feel well. Notes that he has an appointment to evaluate a hernia at his prior surgery site tomorrow. He is scheduled for his colonoscopy later this month.    Last provider visit: 2/6/2025 with Dr. Leo  Next provider visit: 8/19/2025 with Willie Lowe PA-C  Last Quantiferon: 3/2025    IBD HISTORY  Age at diagnosis: 37 (2023)  Extent of disease: mainly small intestine but histologic colon as well on random biopsies   Disease phenotype: inflammatory/stricturing  Lakshmi-anal disease: none  Current CD medications: infliximab 10 mg/kg q 6 weeks  Prior  IBD surgeries: s/p right hemicolectomy with KONO-S anastomosis 12/5/24 with Dr. Osorio  Prior IBD Medications: none    5/2024 MRE  IMPRESSION:   When compared with MRE dated 12/12/2023, there is no significant  change in acute inflammation with chronic fibrosis involving 15cm of  the terminal ileum. There is luminal narrowing without upstream  dilatation.     RADAMES SANTANA MD     IBD Health Maintenance    Vaccinations:  All patients on immunosuppression should avoid live vaccines unless specifically indicated.    -- Influenza (last dose) 9/2024  -- Tetanus booster (last dose) 8/29/2023  -- Pneumococcal Pneumonia    PCV-13: not on file   PPSV-23: not on file   PCV-20/PCV-21: 1/26/2024  -- COVID-19    Initial series: 2021   Last Dose: 4/23/2025    One time confirmation of immunity or serologies:  -- Hepatitis A (serologies or immunizations) 1/26/2024  -- Hepatitis B (serologies or immunizations) serologies indicate immunity 5/2023  -- Varicella/Zoster    Varicella patient acknowledges history of chickenpox   Zoster 2/16/2024 and 6/27/2024  -- MMR 4/22/1999    Due to the immunosuppression in this patient, I would not advise administration of live vaccines such as varicella/VZV, intranasal influenza, MMR, or yellow fever vaccine (if traveling).      Immunosuppressive Screening:    Lab Results   Component Value Date    AUSAB 223.04 05/03/2023    HEPBANG Nonreactive 05/03/2023    HBCAB Nonreactive 05/03/2023    HCVAB Nonreactive 09/26/2023    TBRES Negative 03/25/2025     Cancer Screening:    Skin cancer screening: Recommended annually due to patient's immunosuppression and diagnosis of IBD.   -- completed in June     Depression Screening:    PHQ-2 Score:         1/26/2025     3:07 PM 11/21/2024     8:11 AM   PHQ-2 ( 1999 Pfizer)   Q1: Little interest or pleasure in doing things 0 0   Q2: Feeling down, depressed or hopeless 0 0   PHQ-2 Score 0  0   Q1: Little interest or pleasure in doing things Not at all    Q2:  Feeling down, depressed or hopeless Not at all    PHQ-2 Score 0        Patient-reported     Misc:  -- Avoid tobacco use  -- Avoid NSAIDs as there is potentially a 25% chance of causing an IBD flare    Allergies:   Azelastine 0.1% nasal spray  Eye drops as needed   Cetirizine 10 mg daily as needed     Notes allergies were bad this year, but are improving. No reported concerns.    ----------------    I spent 18 minutes with this patient today. All changes were made via collaborative practice agreement with Priscilla Suresh.     A summary of these recommendations was sent via [employer].    Alexandria Romero PharmD, BCACP  MTM Pharmacist   [facility]   Phone: [phone]    Telemedicine Visit Details  The patient's medications can be safely assessed via a telemedicine encounter.  Type of service:  Telephone visit  Originating Location (pt. Location): Home    Distant Location (provider location):  Off-site  Start Time: 9:35 AM  End Time: 9:54 AM     Medication Therapy Recommendations  Crohn disease (H)   1 Rationale: Preventive therapy - Needs additional medication therapy - Indication   Recommendation: Order Vaccine - HAVRIX IM   Status: Patient Agreed - Adherence/Education   Identified Date: 7/9/2025 Completed Date: 7/10/2025

## 2025-07-10 ENCOUNTER — OFFICE VISIT (OUTPATIENT)
Dept: FAMILY MEDICINE | Facility: CLINIC | Age: 40
End: 2025-07-10
Payer: COMMERCIAL

## 2025-07-10 VITALS
DIASTOLIC BLOOD PRESSURE: 82 MMHG | HEART RATE: 84 BPM | RESPIRATION RATE: 18 BRPM | BODY MASS INDEX: 20.16 KG/M2 | OXYGEN SATURATION: 100 % | HEIGHT: 73 IN | TEMPERATURE: 98.2 F | WEIGHT: 152.1 LBS | SYSTOLIC BLOOD PRESSURE: 124 MMHG

## 2025-07-10 DIAGNOSIS — R19.06 ABDOMINAL WALL MASS OF EPIGASTRIC REGION: Primary | ICD-10-CM

## 2025-07-10 DIAGNOSIS — Z23 ENCOUNTER FOR IMMUNIZATION: ICD-10-CM

## 2025-07-10 ASSESSMENT — PAIN SCALES - GENERAL: PAINLEVEL_OUTOF10: NO PAIN (0)

## 2025-07-10 NOTE — PROGRESS NOTES
"  Assessment & Plan     Abdominal wall mass of epigastric region  Will obtain soft tissue ultrasound of abdominal wall to further characterize this mass, lipoma vs cyst vs small hernia.   Discussed if hernia, would recommend avoiding heavy lifting, bending at the waist, intense exercise, straining with bowel movements.   Will follow-up with results and any needed follow-up.   - US Soft Tissue Abdominal Wall or Lower Back    Encounter for immunization  - HEPATITIS A 19+ (HAVRIX/VAQTA)          Jyoti Everett is a 39 year old, presenting for the following health issues:  Hernia and vaccines (Hep A)        7/10/2025    10:36 AM   Additional Questions   Roomed by Derrick KEY     History of Present Illness       Reason for visit:  Assessment of what appears to be an incisional hernia  Symptom onset:  3-7 days ago  Symptoms include:  Raised area at site of abdominal incision  Symptom intensity:  Moderate  Symptom progression:  Staying the same  Had these symptoms before:  No   He is taking medications regularly.      Pt had a small bowel resection in Dec, surgical scar above umbilicus   Noticed a bump in the incision about a week ago  Not painful or bothersome at all     It doesn't seem to have grown since he has first noticed it.     No changes to appetite, stool patterns, no unintentional weight loss or fevers.   No pain with bearing down or straining to have a bowel movement.         Objective    /82 (BP Location: Right arm, Patient Position: Sitting, Cuff Size: Adult Regular)   Pulse 84   Temp 98.2  F (36.8  C) (Temporal)   Resp 18   Ht 1.85 m (6' 0.84\")   Wt 69 kg (152 lb 1.6 oz)   SpO2 100%   BMI 20.16 kg/m    Body mass index is 20.16 kg/m .  Physical Exam   GENERAL: alert and no distress  ABDOMEN: epigastric subcutaneous approx 2 cm round mass under surgical scar that is mobile, reducible, nontender without surrounding erythema, bruising or swelling.   SKIN: no suspicious lesions or rashes  PSYCH: " mentation appears normal, affect normal/bright            Signed Electronically by: LUCAS Frazier CNP

## 2025-07-10 NOTE — PATIENT INSTRUCTIONS
"Recommendations from today's MTM visit:                                                       Continue infliximab 10 mg/kg IV every six weeks.    Reminder to complete the hepatitis A series when able.    Follow-up:    -- 6 months for MTM, sooner if needed    It was great speaking with you today.  I value your experience and would be very thankful for your time in providing feedback in our clinic survey. In the next few days, you may receive an email or text message from [facility] with a link to a survey related to your  clinical pharmacist.\"     To schedule another MTM appointment, please call the clinic directly or you may call the MTM scheduling line at [phone].    My Clinical Pharmacist's contact information:                                                      Please feel free to contact me with any questions or concerns you have.      Alexandria EllingtonD, BCACP  MTM Pharmacist   [facility]   Phone: [phone]    "

## 2025-07-14 ENCOUNTER — ANCILLARY PROCEDURE (OUTPATIENT)
Dept: ULTRASOUND IMAGING | Facility: CLINIC | Age: 40
End: 2025-07-14
Payer: COMMERCIAL

## 2025-07-14 ENCOUNTER — HOME INFUSION (OUTPATIENT)
Dept: HOME HEALTH SERVICES | Facility: HOME HEALTH | Age: 40
End: 2025-07-14
Payer: COMMERCIAL

## 2025-07-14 DIAGNOSIS — R19.06 ABDOMINAL WALL MASS OF EPIGASTRIC REGION: ICD-10-CM

## 2025-07-14 PROCEDURE — 76705 ECHO EXAM OF ABDOMEN: CPT | Mod: GC | Performed by: STUDENT IN AN ORGANIZED HEALTH CARE EDUCATION/TRAINING PROGRAM

## 2025-07-16 ENCOUNTER — PATIENT OUTREACH (OUTPATIENT)
Dept: CARE COORDINATION | Facility: CLINIC | Age: 40
End: 2025-07-16
Payer: COMMERCIAL

## 2025-07-17 NOTE — TELEPHONE ENCOUNTER
REFERRAL INFORMATION:  Referring Provider:      Maria De Jesus Kaplan APRN CNP     Referring Clinic:  [facility]   Reason for Visit/Diagnosis: K43.9 (ICD-10-CM) - Abdominal wall hernia        FUTURE VISIT INFORMATION:  Appointment Date: 7/21/25  Appointment Time:      NOTES RECORD STATUS  DETAILS   OFFICE NOTE from Referring Provider Internal 7/10/25   OFFICE NOTE from Other Specialists Internal 2/6/25-Dr. Leo-Gastroenterology   HOSPITAL DISCHARGE SUMMARY/ ED VISITS  Internal Admission:  12/5/24-12/9/24 2/21/23-2/25/23   OPERATIVE REPORT Internal 12/5/24-Laparoscopic ileocecectomy with KONO-S anastomosis     2/22/23-DIAGNOSTIC LAPAROSCOPY, LYSIS OF ADHESION, DRAIN PLACEMENT    PERTINENT LABS Internal    IMAGING (CT, MRI, US, XR)  Internal 7/14/25-US soft tissue abdominal wall or lower back     Records Requested    Facility    Fax:    Outcome

## 2025-07-21 ENCOUNTER — EPISODE UPDATE (OUTPATIENT)
Dept: HOME HEALTH SERVICES | Facility: HOME HEALTH | Age: 40
End: 2025-07-21
Payer: COMMERCIAL

## 2025-07-21 ENCOUNTER — PRE VISIT (OUTPATIENT)
Dept: SURGERY | Facility: CLINIC | Age: 40
End: 2025-07-21

## 2025-07-21 ENCOUNTER — ANESTHESIA EVENT (OUTPATIENT)
Dept: SURGERY | Facility: AMBULATORY SURGERY CENTER | Age: 40
End: 2025-07-21
Payer: COMMERCIAL

## 2025-07-22 ENCOUNTER — HOME INFUSION BILLING (OUTPATIENT)
Dept: HOME HEALTH SERVICES | Facility: HOME HEALTH | Age: 40
End: 2025-07-22
Payer: COMMERCIAL

## 2025-07-22 ENCOUNTER — RESULTS FOLLOW-UP (OUTPATIENT)
Dept: GASTROENTEROLOGY | Facility: CLINIC | Age: 40
End: 2025-07-22
Payer: COMMERCIAL

## 2025-07-22 ENCOUNTER — ANESTHESIA (OUTPATIENT)
Dept: SURGERY | Facility: AMBULATORY SURGERY CENTER | Age: 40
End: 2025-07-22
Payer: COMMERCIAL

## 2025-07-22 ENCOUNTER — HOSPITAL ENCOUNTER (OUTPATIENT)
Facility: AMBULATORY SURGERY CENTER | Age: 40
Discharge: HOME | End: 2025-07-22
Attending: INTERNAL MEDICINE | Admitting: INTERNAL MEDICINE
Payer: COMMERCIAL

## 2025-07-22 VITALS
BODY MASS INDEX: 20.59 KG/M2 | HEIGHT: 72 IN | OXYGEN SATURATION: 100 % | WEIGHT: 152 LBS | DIASTOLIC BLOOD PRESSURE: 66 MMHG | TEMPERATURE: 97.3 F | RESPIRATION RATE: 16 BRPM | HEART RATE: 70 BPM | SYSTOLIC BLOOD PRESSURE: 96 MMHG

## 2025-07-22 VITALS — HEART RATE: 74 BPM

## 2025-07-22 LAB — COLONOSCOPY: NORMAL

## 2025-07-22 PROCEDURE — A4215 STERILE NEEDLE: HCPCS

## 2025-07-22 PROCEDURE — A4217 STERILE WATER/SALINE, 500 ML: HCPCS | Mod: JZ

## 2025-07-22 PROCEDURE — A4213 20+ CC SYRINGE ONLY: HCPCS

## 2025-07-22 PROCEDURE — S9359 HIT ANTI-TNF PER DIEM: HCPCS

## 2025-07-22 RX ORDER — PROPOFOL 10 MG/ML
INJECTION, EMULSION INTRAVENOUS PRN
Status: DISCONTINUED | OUTPATIENT
Start: 2025-07-22 | End: 2025-07-22

## 2025-07-22 RX ORDER — NALOXONE HYDROCHLORIDE 0.4 MG/ML
0.2 INJECTION, SOLUTION INTRAMUSCULAR; INTRAVENOUS; SUBCUTANEOUS
Status: DISCONTINUED | OUTPATIENT
Start: 2025-07-22 | End: 2025-07-23 | Stop reason: HOSPADM

## 2025-07-22 RX ORDER — NALOXONE HYDROCHLORIDE 0.4 MG/ML
0.4 INJECTION, SOLUTION INTRAMUSCULAR; INTRAVENOUS; SUBCUTANEOUS
Status: DISCONTINUED | OUTPATIENT
Start: 2025-07-22 | End: 2025-07-23 | Stop reason: HOSPADM

## 2025-07-22 RX ORDER — SODIUM CHLORIDE, SODIUM LACTATE, POTASSIUM CHLORIDE, CALCIUM CHLORIDE 600; 310; 30; 20 MG/100ML; MG/100ML; MG/100ML; MG/100ML
INJECTION, SOLUTION INTRAVENOUS CONTINUOUS PRN
Status: DISCONTINUED | OUTPATIENT
Start: 2025-07-22 | End: 2025-07-22

## 2025-07-22 RX ORDER — ONDANSETRON 4 MG/1
4 TABLET, ORALLY DISINTEGRATING ORAL EVERY 6 HOURS PRN
Status: DISCONTINUED | OUTPATIENT
Start: 2025-07-22 | End: 2025-07-23 | Stop reason: HOSPADM

## 2025-07-22 RX ORDER — PROPOFOL 10 MG/ML
INJECTION, EMULSION INTRAVENOUS CONTINUOUS PRN
Status: DISCONTINUED | OUTPATIENT
Start: 2025-07-22 | End: 2025-07-22

## 2025-07-22 RX ORDER — FLUMAZENIL 0.1 MG/ML
0.2 INJECTION, SOLUTION INTRAVENOUS
Status: ACTIVE | OUTPATIENT
Start: 2025-07-22 | End: 2025-07-22

## 2025-07-22 RX ORDER — PROCHLORPERAZINE MALEATE 10 MG
10 TABLET ORAL EVERY 6 HOURS PRN
Status: DISCONTINUED | OUTPATIENT
Start: 2025-07-22 | End: 2025-07-23 | Stop reason: HOSPADM

## 2025-07-22 RX ORDER — LIDOCAINE HYDROCHLORIDE 20 MG/ML
INJECTION, SOLUTION INFILTRATION; PERINEURAL PRN
Status: DISCONTINUED | OUTPATIENT
Start: 2025-07-22 | End: 2025-07-22

## 2025-07-22 RX ORDER — ONDANSETRON 2 MG/ML
4 INJECTION INTRAMUSCULAR; INTRAVENOUS EVERY 6 HOURS PRN
Status: DISCONTINUED | OUTPATIENT
Start: 2025-07-22 | End: 2025-07-23 | Stop reason: HOSPADM

## 2025-07-22 RX ORDER — ONDANSETRON 2 MG/ML
4 INJECTION INTRAMUSCULAR; INTRAVENOUS
Status: DISCONTINUED | OUTPATIENT
Start: 2025-07-22 | End: 2025-07-22 | Stop reason: HOSPADM

## 2025-07-22 RX ORDER — LIDOCAINE 40 MG/G
CREAM TOPICAL
Status: DISCONTINUED | OUTPATIENT
Start: 2025-07-22 | End: 2025-07-22 | Stop reason: HOSPADM

## 2025-07-22 RX ADMIN — SODIUM CHLORIDE, SODIUM LACTATE, POTASSIUM CHLORIDE, CALCIUM CHLORIDE: 600; 310; 30; 20 INJECTION, SOLUTION INTRAVENOUS at 09:34

## 2025-07-22 RX ADMIN — PROPOFOL 200 MCG/KG/MIN: 10 INJECTION, EMULSION INTRAVENOUS at 10:41

## 2025-07-22 RX ADMIN — LIDOCAINE HYDROCHLORIDE 40 MG: 20 INJECTION, SOLUTION INFILTRATION; PERINEURAL at 10:41

## 2025-07-22 RX ADMIN — PROPOFOL 80 MG: 10 INJECTION, EMULSION INTRAVENOUS at 10:41

## 2025-07-22 ASSESSMENT — COPD QUESTIONNAIRES: COPD: 0

## 2025-07-22 ASSESSMENT — LIFESTYLE VARIABLES: TOBACCO_USE: 0

## 2025-07-22 ASSESSMENT — ENCOUNTER SYMPTOMS: ORTHOPNEA: 0

## 2025-07-22 NOTE — ANESTHESIA CARE TRANSFER NOTE
Patient: Karlos Terrazas    Procedure: Procedure(s):  Colonoscopy       Diagnosis: Crohn's disease of ileum with other complication (H) [K50.018]  Immunosuppression [D84.9]  Diagnosis Additional Information: No value filed.    Anesthesia Type:   MAC     Note:    Oropharynx: oropharynx clear of all foreign objects and spontaneously breathing  Level of Consciousness: awake  Oxygen Supplementation: room air  Level of Supplemental Oxygen (L/min / FiO2): 0  Independent Airway: airway patency satisfactory and stable  Dentition: dentition unchanged  Vital Signs Stable: post-procedure vital signs reviewed and stable  Report to RN Given: handoff report given  Patient transferred to: Phase II  Comments: Uneventful transport   Report to RN - Yanet  Pt comfortable  Exchanging well; color natl  Pt responds appropriately to command  IV patent  Lips/teeth/dentition as preop status  Questions answered      Handoff Report: Identifed the Patient, Identified the Reponsible Provider, Reviewed the pertinent medical history, Discussed the surgical course, Reviewed Intra-OP anesthesia mangement and issues during anesthesia, Set expectations for post-procedure period and Allowed opportunity for questions and acknowledgement of understanding      Vitals:  Vitals Value Taken Time   /61 07/22/25 11:05   Temp 36.4  C (97.5  F) 07/22/25 11:05   Pulse 71 07/22/25 11:05   Resp 16 07/22/25 11:05   SpO2 99 % 07/22/25 11:08   Vitals shown include unfiled device data.    Electronically Signed By: LUCAS DO CRNA  July 22, 2025  11:08 AM

## 2025-07-22 NOTE — ANESTHESIA POSTPROCEDURE EVALUATION
Patient: Karlos Terrazas    Procedure: Procedure(s):  Colonoscopy       Anesthesia Type:  MAC    Note:  Disposition: Outpatient   Postop Pain Control: Uneventful            Sign Out: Well controlled pain   PONV: No   Neuro/Psych: Uneventful            Sign Out: Acceptable/Baseline neuro status   Airway/Respiratory: Uneventful            Sign Out: Acceptable/Baseline resp. status   CV/Hemodynamics: Uneventful            Sign Out: Acceptable CV status; No obvious hypovolemia; No obvious fluid overload   Other NRE:    DID A NON-ROUTINE EVENT OCCUR?            Last vitals:  Vitals Value Taken Time   BP 96/66 07/22/25 11:30   Temp 36.3  C (97.3  F) 07/22/25 11:30   Pulse 58 07/22/25 11:29   Resp 16 07/22/25 11:30   SpO2 100 % 07/22/25 11:31   Vitals shown include unfiled device data.    Electronically Signed By: Luana Thompson MD  July 22, 2025  1:17 PM

## 2025-07-22 NOTE — ANESTHESIA PREPROCEDURE EVALUATION
Anesthesia Pre-Procedure Evaluation    Patient: Karlos Terrazas   MRN: [id number] : 1985          Procedure : Procedure(s):  Colonoscopy         Past Medical History:   Diagnosis Date    Anemia 2023    Crohn's disease    Crohn's disease of small intestine without complication (H)       Past Surgical History:   Procedure Laterality Date    COLONOSCOPY N/A 2023    Procedure: COLONOSCOPY, WITH BIOPSY;  Surgeon: Lamont Seaman MD;  Location: [facility] OR    LAPAROSCOPIC APPENDECTOMY N/A 2023    STILL HAS APPENDIX - actually had a diagnostic laparoscopy and Crohn's was diagnosed    LAPAROSCOPIC RESECTION ILEOCOLIC N/A 2024    Procedure: Laparoscopic ileocecectomy with KONO-S anastomosis;  Surgeon: Talat Osorio MD;  Location: [facility] OR    [facility]  2019      Allergies   Allergen Reactions    Other Food Allergy Angioedema     carrots      Social History     Tobacco Use    Smoking status: Never    Smokeless tobacco: Never   Substance Use Topics    Alcohol use: Not Currently     Comment: 0.5 drinks per week      Wt Readings from Last 1 Encounters:   25 68.9 kg (152 lb)        Anesthesia Evaluation   Pt has had prior anesthetic. Type: MAC and General.    History of anesthetic complications  - PONV.  PONV with GA, not with MAC.    ROS/MED HX  ENT/Pulmonary:    (-) tobacco use, asthma, COPD and recent URI   Neurologic:       Cardiovascular:    (-) PIERSON, orthopnea/PND and syncope   METS/Exercise Tolerance: >4 METS Comment: Rock climbing, hiking without concerning exertional symptoms   Hematologic:       Musculoskeletal:       GI/Hepatic: Comment: Crohn's disease   (-) GERD   Renal/Genitourinary:       Endo:       Psychiatric/Substance Use:     (+)     Recreational drug usage: Cannabis.    Infectious Disease:       Malignancy:       Other:              Physical Exam  Airway  Mallampati: I  TM distance: >3 FB  Neck ROM: full  Mouth opening: >= 4 cm    Cardiovascular   Rhythm: regular  Rate: normal  "rate     Dental       Pulmonary Breath sounds clear to auscultation        Neurological   He appears awake, alert and oriented x3.    Other Findings       OUTSIDE LABS:  CBC:   Lab Results   Component Value Date    WBC 6.2 04/29/2025    WBC 4.7 02/04/2025    HGB 13.4 04/29/2025    HGB 13.6 02/04/2025    HCT 39.3 (L) 04/29/2025    HCT 40.9 02/04/2025     04/29/2025     02/04/2025     BMP:   Lab Results   Component Value Date     12/09/2024     12/08/2024    POTASSIUM 3.6 12/09/2024    POTASSIUM 3.5 12/08/2024    CHLORIDE 98 12/09/2024    CHLORIDE 99 12/08/2024    CO2 23 12/09/2024    CO2 23 12/08/2024    BUN 15.2 12/09/2024    BUN 11.4 12/08/2024    CR 0.89 12/09/2024    CR 0.92 12/08/2024    GLC 88 12/09/2024    GLC 89 12/08/2024     COAGS: No results found for: \"PTT\", \"INR\", \"FIBR\"  POC: No results found for: \"BGM\", \"HCG\", \"HCGS\"  HEPATIC:   Lab Results   Component Value Date    ALBUMIN 4.6 04/29/2025    PROTTOTAL 7.5 04/29/2025    ALT 28 04/29/2025    AST 32 04/29/2025    ALKPHOS 96 04/29/2025    BILITOTAL 0.4 04/29/2025     OTHER:   Lab Results   Component Value Date    SABRINA 9.3 12/09/2024    PHOS 3.3 12/09/2024    MAG 2.0 12/09/2024    LIPASE 21 02/21/2023    TSH 1.96 03/12/2024    CRP 1.3 (H) 05/03/2023    SED 16 (H) 01/16/2024       Anesthesia Plan    ASA Status:  2      NPO Status: NPO Appropriate   Anesthesia Type: MAC.   Techniques and Equipment:       - Monitoring Plan: standard ASA monitoring     Consents    Anesthesia Plan(s) and associated risks, benefits, and realistic alternatives discussed. Questions answered and patient/representative(s) expressed understanding.     - Discussed: anesthesiologist     - Discussed with:  Patient               Postoperative Care         Comments:    Other Comments: Discussed plan for MAC, including possibility of awareness, risk of aspiration pneumonia, risk of hypoxia/low oxygen/airway obstruction requiring additional airway support/devices. " Discussed alternatives. Discussed backup plan of general anesthesia and risks of general anesthesia, including intubation. Ensured understanding, invited questions and all questions were answered.               Luana Thompson MD    I have reviewed the pertinent notes and labs in the chart from the past 30 days and (re)examined the patient.  Any updates or changes from those notes are reflected in this note.    Clinically Significant Risk Factors Present on Admission

## 2025-07-22 NOTE — PROGRESS NOTES
Colon and Rectal Surgery Clinic Note    RE: Karlos Terrazas.  : 1985.  TIMMY: 2025.    Reason for visit: hernia.    HPI:  Karlos Terrazas is a 38 year old male who presents today for a hernia. He has a past medical history of anemia. He was diagnosed with moderate to severe Crohn's ileitis (multifocal with stricturing) at the age of 37. Currently on infliximab every 6 weeks. Colonoscopy on 2023 demonstrated terminal ileum stricturing. Surgical Pathology demonstrated active ileitis with granulomas. Rare punctate red lesions with a white center were noted in the left colon. Pathology showed active colitis. MRE on 2023 demonstrated a long segment of acute inflammation of the ileum with associated luminal narrowing. There was no significant change on most recent MRE on 2024. He is s/p laparoscopic right colectomy with KONO-S anastomosis on 24. He had an abdominal US on 2025 for a painless lump which demonstrated a fat-containing midline abdominal wall hernia superior to the umbilicus. Colonoscopy on 2025 demonstrated a patent end-to-side ileo-colonic anastomosis. There was no inflammation seen on exam. CT with very small ventral hernia.          Interval History: Doing well overall, somehow concerned about PE.    Abdominal US (2025):  Findings:   Focused grayscale and color Doppler evaluation of the region of  patient concern approximately 2 cm superior to the umbilicus in region  of prior surgical scar. Within the region of interest. There is a  approximately 1.0 x 0.5 cm defect within the abdominal wall  musculature with herniation of intra-abdominal fat. No significant  color Doppler signal. No evidence of bowel herniation.                                                          Impression:   Fat-containing midline abdominal wall hernia in region of cervical  scar superior to the umbilicus. No evidence of bowel herniation or  significant color Doppler signal is seen.    "  Colonoscopy (7/22/2025):  Impression:            - The examined portion of the ileum was normal.                          - Patent end-to-side ileo-colonic anastomosis,                          characterized by healthy appearing mucosa.                          - The distal rectum and anal verge are normal on                          retroflexion view.                          - Crohn's disease, in remission. Inflammation was not                          found on today's exam (based on the endoscopic                          appearance of the mucosa). This was graded as                          Rutgeerts Score i0 (no lesions in the distal ileum),                          in remission compared to previous examinations.                          - No specimens collected.     CT 7/2025:  IMPRESSION:   1. Status post right hemicolectomy and distal ileectomy with KONOS  anastomosis. Patent anastomosis without evidence of postsurgical  complication.  2. No evidence of bowel inflammation.  3. Nonobstructive left nephrolithiasis.    Medications:  Current Outpatient Medications   Medication Sig Dispense Refill    azelastine (ASTELIN) 0.1 % nasal spray Spray 1 spray into both nostrils 2 times daily. 30 mL 4    CALCIUM-VITAMIN D PO Take 1 tablet by mouth daily      carboxymethylcellulose PF (CARBOXYMETHYLCELLULOSE SODIUM) 0.5 % ophthalmic solution Place 1 drop into both eyes 4 times daily Preservative free artificial tears, single use vials. 400 each 11    cetirizine (ZYRTEC) 10 MG tablet Take 10 mg by mouth daily.      diphenhydrAMINE (BENADRYL) 50 MG/ML injection Inject 1 mL (50 mg) over 3-5 minutes into the vein via push as needed for other (infusion reaction). For RN use only.  Draw up in a syringe and administer IV push.  Discard remainder of vial. 23584 mL 0    Emergency Supply Kit, PIV, Patient use for emergency only. Contents: 3 sodium chloride 0.9% flushes, 1 IV start kit, 1 microclave ext set 14\", 1 each IV Cath 22 " "G/1\" and 24G/3/4\", 6 alcohol prep pads, 4 nitrile gloves (med). Call [phone] to reorder. [id number] kit 0    EPINEPHrine (ANY BX GENERIC EQUIV) 0.3 MG/0.3ML injection 2-pack Inject 0.3 mLs (0.3 mg) into the muscle as needed for anaphylaxis (infusion reaction). Administer into the mid-thigh in case of severe anaphylaxis (wheezing, throat tightening, mouth swelling, difficulty breathing). May repeat dose one time in 5-15 minutes if symptoms persist. 548615 mL 0    FERROUS BISGLYCINATE CHELATE PO Take 1 tablet by mouth three times a week      inFLIXimab (REMICADE) 100 MG injection Add to infusion 70 mLs (700 mg) once every six weeks. Reconstitute infliximab vial(s).  Draw up infliximab 10 mg/mL in syringe with 21 G needle and add to NaCl 0.9% bag immediately prior to infusing.  MIX GENTLY BY INVERSION, DO NOT SHAKE. Discard remainder of vial(s). 56 each 0    methylPREDNISolone Na Suc, PF, (SOLU-MEDROL) 125 mg/2 mL injection Inject 2 mLs (125 mg) over 3-5 minutes into the vein via push as needed (infusion reaction). For RN use only.  Reconstitute vial. Draw up methylPREDNISolone in a syringe and administer.  Discard remainder of vial. 245118 mL 0    sodium chloride 0.9% bag Infuse 250 mLs over 1 hours into the vein once every six weeks. Add 70 mL (700 mg) of infliximab 10 mg/mL to bag immediately prior to infusing via gravity infusion. When complete, flush bag with 20 mL NS to flush tubing. 212359 mL 0    sodium chloride 0.9% infusion Infuse 500 mLs into the vein as needed for other (infusion reaction). In case of mild reaction, administer via gravity at 20 mL/hr to keep vein open. In case of severe reaction, administer via gravity wide open on prime setting. 819855 mL 0    sodium chloride, PF, 0.9% PF flush Inject 10 mLs into the vein as needed for other (infusion reaction). For RN use only as needed for infusion reaction 698394 mL 0    sodium chloride, PF, 0.9% PF flush Inject 10 mLs into the vein as needed for " line flush. Flush IV before and after medication administration as directed and/or at least every 12 hours. 976152 mL 0    sterile water, preservative free, injection Use 70 mLs for reconstitution once every six weeks. 1. Reconstitute each vial of infliximab with 10 mL of sterile water for injection by slowly injecting 10 mL sterile water down the inside wall of vial w/21 G needle. DO NOT SHAKE. Foaming of the solution on reconstitution is not unusual. Roll and tilt each vial gently.  2. Let drug stand for 5 minutes. 3. Inspect vials for particules and/or discoloration prior to continuing. The solution should be colorless to light yellow and opalescent, and may develop a few translucent particles. DO NOT USE IF DRUG HAS NOT FULLY DISSOLVED OR IF OPAQUE PARTICLES, DISCOLORATION OR OTHER FOREIGN PARTICULES ARE PRESENT. 4. Draw up appropriate dose w/ 21 G needle from vial. 560 mL 0       Allergies:  Allergies   Allergen Reactions    Other Food Allergy Angioedema     carrots       Social history:   Social History     Tobacco Use    Smoking status: Never    Smokeless tobacco: Never   Substance Use Topics    Alcohol use: Not Currently     Comment: 0.5 drinks per week     Marital status: .    ROS:  A complete review of systems was performed with the patient and all systems negative except as per HPI.    Physical Examination:  /78 (BP Location: Left arm, Patient Position: Sitting, Cuff Size: Adult Regular)   Pulse 76   Ht 1.829 m (6')   Wt 68.7 kg (151 lb 8 oz)   SpO2 99%   BMI 20.55 kg/m    General: Well hydrated. No acute distress.  Abdomen: Soft, NT      ASSESSMENT    This is a 39 year old M with a long stricture at the TI s/p laparoscopic right colectomy with KONO-S anastomosis on 12/5/24, now with very small ventral hernia. Risks, benefits, and alternatives of operative treatment were thoroughly discussed with the patient, he understands these well.    All pertinent labs and imaging were personally  reviewed by me.    PLAN  - Observation  - Follow up with GI  - RTC as needed    30 minutes spent on the date of the encounter doing chart review, history and exam, imaging review, documentation and further activities as noted above.      Talat Osorio MD, FACS, FASCRS, FSSO    Division of Colon and Rectal Surgery  [facility]    Referring Provider:  No referring provider defined for this encounter.     Primary Care Provider:  Mary Velasquez

## 2025-07-22 NOTE — H&P
Karlos Terrazas  [id number]  male  39 year old      Reason for procedure/surgery: Crohn's DIsease    Patient Active Problem List   Diagnosis    Crohn's disease (H)    Anemia, iron deficiency    Crohn disease (H)       Past Surgical History:    Past Surgical History:   Procedure Laterality Date    COLONOSCOPY N/A 04/07/2023    Procedure: COLONOSCOPY, WITH BIOPSY;  Surgeon: Lamont Seaman MD;  Location: [facility] OR    LAPAROSCOPIC APPENDECTOMY N/A 02/22/2023    STILL HAS APPENDIX - actually had a diagnostic laparoscopy and Crohn's was diagnosed    LAPAROSCOPIC RESECTION ILEOCOLIC N/A 12/5/2024    Procedure: Laparoscopic ileocecectomy with KONO-S anastomosis;  Surgeon: Talat Osorio MD;  Location: [facility] OR    LASIK  1/1/2019       Past Medical History:   Past Medical History:   Diagnosis Date    Anemia 2/1/2023    Crohn's disease    Crohn's disease of small intestine without complication (H)        Social History:   Social History     Tobacco Use    Smoking status: Never    Smokeless tobacco: Never   Substance Use Topics    Alcohol use: Not Currently     Comment: 0.5 drinks per week       Family History:   Family History   Problem Relation Age of Onset    Melanoma Mother     Skin Cancer Mother     Hyperlipidemia Mother     Thyroid Disease Mother     Anesthesia Reaction Mother         PONV    Skin Cancer Father         SCC and BCC    Skin Cancer Maternal Grandmother     Deep Vein Thrombosis (DVT) Maternal Grandmother     Heart Disease Maternal Grandmother     Heart Disease Maternal Grandfather     Melanoma Paternal Grandmother     Colon Cancer No family hx of        Allergies:   Allergies   Allergen Reactions    Other Food Allergy Angioedema     carrots       Active Medications:   Current Outpatient Medications   Medication Sig Dispense Refill    azelastine (ASTELIN) 0.1 % nasal spray Spray 1 spray into both nostrils 2 times daily. 30 mL 4    CALCIUM-VITAMIN D PO Take 1 tablet by mouth daily      carboxymethylcellulose  PF (CARBOXYMETHYLCELLULOSE SODIUM) 0.5 % ophthalmic solution Place 1 drop into both eyes 4 times daily Preservative free artificial tears, single use vials. 400 each 11    cetirizine (ZYRTEC) 10 MG tablet Take 10 mg by mouth daily.      diphenhydrAMINE (BENADRYL) 50 MG/ML injection Inject 1 mL (50 mg) over 3-5 minutes into the vein via push as needed for other (infusion reaction). For RN use only.  Draw up in a syringe and administer IV push.  Discard remainder of vial. 67523 mL 0    FERROUS BISGLYCINATE CHELATE PO Take 1 tablet by mouth three times a week      inFLIXimab (REMICADE) 100 MG injection Add to infusion 70 mLs (700 mg) once every six weeks. Reconstitute infliximab vial(s).  Draw up infliximab 10 mg/mL in syringe with 21 G needle and add to NaCl 0.9% bag immediately prior to infusing.  MIX GENTLY BY INVERSION, DO NOT SHAKE. Discard remainder of vial(s). 56 each 0    methylPREDNISolone Na Suc, PF, (SOLU-MEDROL) 125 mg/2 mL injection Inject 2 mLs (125 mg) over 3-5 minutes into the vein via push as needed (infusion reaction). For RN use only.  Reconstitute vial. Draw up methylPREDNISolone in a syringe and administer.  Discard remainder of vial. 953370 mL 0    sodium chloride 0.9% bag Infuse 250 mLs over 1 hours into the vein once every six weeks. Add 70 mL (700 mg) of infliximab 10 mg/mL to bag immediately prior to infusing via gravity infusion. When complete, flush bag with 20 mL NS to flush tubing. 054423 mL 0    sodium chloride 0.9% infusion Infuse 500 mLs into the vein as needed for other (infusion reaction). In case of mild reaction, administer via gravity at 20 mL/hr to keep vein open. In case of severe reaction, administer via gravity wide open on prime setting. 771279 mL 0    sodium chloride, PF, 0.9% PF flush Inject 10 mLs into the vein as needed for other (infusion reaction). For RN use only as needed for infusion reaction 186597 mL 0    sodium chloride, PF, 0.9% PF flush Inject 10 mLs into the  "vein as needed for line flush. Flush IV before and after medication administration as directed and/or at least every 12 hours. 561183 mL 0    sterile water, preservative free, injection Use 70 mLs for reconstitution once every six weeks. 1. Reconstitute each vial of infliximab with 10 mL of sterile water for injection by slowly injecting 10 mL sterile water down the inside wall of vial w/21 G needle. DO NOT SHAKE. Foaming of the solution on reconstitution is not unusual. Roll and tilt each vial gently.  2. Let drug stand for 5 minutes. 3. Inspect vials for particules and/or discoloration prior to continuing. The solution should be colorless to light yellow and opalescent, and may develop a few translucent particles. DO NOT USE IF DRUG HAS NOT FULLY DISSOLVED OR IF OPAQUE PARTICLES, DISCOLORATION OR OTHER FOREIGN PARTICULES ARE PRESENT. 4. Draw up appropriate dose w/ 21 G needle from vial. 560 mL 0    Emergency Supply Kit, PIV, Patient use for emergency only. Contents: 3 sodium chloride 0.9% flushes, 1 IV start kit, 1 microclave ext set 14\", 1 each IV Cath 22 G/1\" and 24G/3/4\", 6 alcohol prep pads, 4 nitrile gloves (med). Call [phone] to reorder. [id number] kit 0    EPINEPHrine (ANY BX GENERIC EQUIV) 0.3 MG/0.3ML injection 2-pack Inject 0.3 mLs (0.3 mg) into the muscle as needed for anaphylaxis (infusion reaction). Administer into the mid-thigh in case of severe anaphylaxis (wheezing, throat tightening, mouth swelling, difficulty breathing). May repeat dose one time in 5-15 minutes if symptoms persist. 173130 mL 0       Systemic Review:   CONSTITUTIONAL: NEGATIVE for fever, chills, change in weight  ENT/MOUTH: NEGATIVE for ear, mouth and throat problems  RESP: NEGATIVE for significant cough or SOB  CV: NEGATIVE for chest pain, palpitations or peripheral edema    Physical Examination:   Vital Signs: /84 (BP Location: Right arm)   Pulse 92   Temp 97.5  F (36.4  C) (Temporal)   Resp 18   Ht 1.829 m (6')   " Wt 68.9 kg (152 lb)   SpO2 100%   BMI 20.61 kg/m    GENERAL: healthy, alert and no distress  NECK: no adenopathy, no asymmetry, masses, or scars  RESP: lungs clear to auscultation - no rales, rhonchi or wheezes  CV: regular rate and rhythm, normal S1 S2, no S3 or S4, no murmur, click or rub, no peripheral edema and peripheral pulses strong  ABDOMEN: soft, nontender, no hepatosplenomegaly, no masses and bowel sounds normal  MS: no gross musculoskeletal defects noted, no edema    Plan: Appropriate to proceed as scheduled.      Lazaro Leo MD  7/22/2025    PCP:  Mary Velasquez

## 2025-07-23 ENCOUNTER — LAB REQUISITION (OUTPATIENT)
Dept: LAB | Facility: CLINIC | Age: 40
End: 2025-07-23
Payer: COMMERCIAL

## 2025-07-23 ENCOUNTER — HOME CARE VISIT (OUTPATIENT)
Dept: HOME HEALTH SERVICES | Facility: HOME HEALTH | Age: 40
End: 2025-07-23
Payer: COMMERCIAL

## 2025-07-23 VITALS
SYSTOLIC BLOOD PRESSURE: 100 MMHG | TEMPERATURE: 97.2 F | HEART RATE: 52 BPM | DIASTOLIC BLOOD PRESSURE: 62 MMHG | RESPIRATION RATE: 14 BRPM | OXYGEN SATURATION: 98 %

## 2025-07-23 DIAGNOSIS — K50.90 CROHN'S DISEASE, UNSPECIFIED, WITHOUT COMPLICATIONS (H): ICD-10-CM

## 2025-07-23 LAB
ALBUMIN SERPL BCG-MCNC: 4.7 G/DL (ref 3.5–5.2)
ALP SERPL-CCNC: 82 U/L (ref 40–150)
ALT SERPL W P-5'-P-CCNC: 16 U/L (ref 0–70)
AST SERPL W P-5'-P-CCNC: 23 U/L (ref 0–45)
BASOPHILS # BLD AUTO: 0.1 10E3/UL (ref 0–0.2)
BASOPHILS NFR BLD AUTO: 1 %
BILIRUB DIRECT SERPL-MCNC: 0.21 MG/DL (ref 0–0.45)
BILIRUB SERPL-MCNC: 0.5 MG/DL
CRP SERPL-MCNC: <3 MG/L
EOSINOPHIL # BLD AUTO: 0.2 10E3/UL (ref 0–0.7)
EOSINOPHIL NFR BLD AUTO: 3 %
ERYTHROCYTE [DISTWIDTH] IN BLOOD BY AUTOMATED COUNT: 12.3 % (ref 10–15)
HCT VFR BLD AUTO: 39 % (ref 40–53)
HGB BLD-MCNC: 13.6 G/DL (ref 13.3–17.7)
IMM GRANULOCYTES # BLD: 0 10E3/UL
IMM GRANULOCYTES NFR BLD: 0 %
LYMPHOCYTES # BLD AUTO: 2.3 10E3/UL (ref 0.8–5.3)
LYMPHOCYTES NFR BLD AUTO: 41 %
MCH RBC QN AUTO: 30.4 PG (ref 26.5–33)
MCHC RBC AUTO-ENTMCNC: 34.9 G/DL (ref 31.5–36.5)
MCV RBC AUTO: 87 FL (ref 78–100)
MONOCYTES # BLD AUTO: 0.5 10E3/UL (ref 0–1.3)
MONOCYTES NFR BLD AUTO: 9 %
NEUTROPHILS # BLD AUTO: 2.5 10E3/UL (ref 1.6–8.3)
NEUTROPHILS NFR BLD AUTO: 46 %
NRBC # BLD AUTO: 0 10E3/UL
NRBC BLD AUTO-RTO: 0 /100
PLATELET # BLD AUTO: 244 10E3/UL (ref 150–450)
PROT SERPL-MCNC: 7.2 G/DL (ref 6.4–8.3)
RBC # BLD AUTO: 4.47 10E6/UL (ref 4.4–5.9)
WBC # BLD AUTO: 5.5 10E3/UL (ref 4–11)

## 2025-07-23 PROCEDURE — 86140 C-REACTIVE PROTEIN: CPT | Performed by: INTERNAL MEDICINE

## 2025-07-23 PROCEDURE — 85025 COMPLETE CBC W/AUTO DIFF WBC: CPT | Performed by: INTERNAL MEDICINE

## 2025-07-23 PROCEDURE — 80076 HEPATIC FUNCTION PANEL: CPT | Performed by: INTERNAL MEDICINE

## 2025-07-23 NOTE — PROGRESS NOTES
Nursing Visit Note:  Nurse visit today for GA, PIV and rapid inflectra infusion for Karlos JOSÉ Terrazas.     present during visit today: Not Applicable.    Intravenous Access:  Labs drawn without difficulty. and Peripheral IV placed.    Infusion Nursing Note:    Pre-infusion Checklist:   Have you had any delayed reaction since last infusion?   No     Have you recently had an elevated temperature, fever, chills productive cough, coughing for 3 weeks or longer or hemoptysis, abnormal vital signs, night sweats, chest pain, or have you noticed a decrease in your appetite, or noted unexplained weight loss or fatigue?   No     Do you have any open wounds or new incisions?  No     Do you have any recent or upcoming hospitalizations, surgeries, or dental procedures? Does not include esophagogastroduodenoscopy, colonoscopy, endoscopic retrograde cholangiopancreatography (ERCP), endoscopic ultrasound (EUS), dental procedures or joint aspiration/steroid injections.   No     Do you currently have or recently have had any signs of illness or infection or are you on any antibiotics?   No     Have you had any new, sudden, or worsening abdominal pain?   No     Have you or anyone in your household received a live vaccination in the past 4 weeks?   No     Have you recently been diagnosed with any new nervous system diseases or cancer diagnosis? (i.e., Multiple Sclerosis, Guillain [place], seizures, neurological changes) Are you receiving any form of radiation or chemotherapy?   No     Are you pregnant or breastfeeding, or do you have plans of pregnancy in the future?   No     Have you been having any signs of worsening depression or suicidal ideation?  No     Have you had any other new onset medical symptoms?  No    Entyvio/Ocrevus/Tyasabri only: Have you been having any new or worsening medical problems such as issues with thinking, eyesight, balance or strength that have persisted over several days?   N/A    Benlysta only: Have  "you been having any signs of worsening depression or suicidal ideations?    N/A    IVIG only: Have you had any new blood clots?  N/A    Did the patient answer \"YES\" to any of the questions above?  No     Will the patient receive a medication that has an order for infusion reaction management?  Yes, and all drugs and supplies are available and none have .     If ordered, has the patient taken pre-medications?  N/A    Plan:   Therapy is appropriate, will proceed with treatment.     Post Infusion Assessment:  Patient tolerated infusion without incident.    and Lab-Only Nursing Note  Biologic infusion info discussed with pt.   Labs obtained via VPT    Time Specimen drawn: 151    Last dose (if applicable): No    Facility sent to: [facility] Tracking number: [id number]    Note: Pt is alert and oriented. VSS. Pt reports feeling well. He had a colonoscopy yesterday and found out he is in remission. No changes in the number of daily bowel movements and we did discuss that he will try adding in more fiber or add a fiber supplement. No other concerns.     Saline administered: 40 (ml)    Supply Check:   Does the patient have all the supplies they need for the next visit?  Yes    Next visit plan: 25 around 10 or 11 am.    Annetta Garcia RN 2025  "

## 2025-07-24 ENCOUNTER — TELEPHONE (OUTPATIENT)
Dept: SURGERY | Facility: CLINIC | Age: 40
End: 2025-07-24
Payer: COMMERCIAL

## 2025-07-24 DIAGNOSIS — K50.00 CROHN'S DISEASE OF SMALL INTESTINE WITHOUT COMPLICATION (H): ICD-10-CM

## 2025-07-24 DIAGNOSIS — K50.90 CROHN'S DISEASE (H): Primary | ICD-10-CM

## 2025-07-24 NOTE — CONFIDENTIAL NOTE
Let Karlos know Dr. Osorio would like CT scan before his visit on Tuesday 7/29. Karlos will get this scheduled. I provided the scheduling number for him.

## 2025-07-25 ENCOUNTER — ANCILLARY PROCEDURE (OUTPATIENT)
Dept: CT IMAGING | Facility: CLINIC | Age: 40
End: 2025-07-25
Attending: SURGERY
Payer: COMMERCIAL

## 2025-07-25 DIAGNOSIS — K50.00 CROHN'S DISEASE OF SMALL INTESTINE WITHOUT COMPLICATION (H): ICD-10-CM

## 2025-07-25 PROCEDURE — 74177 CT ABD & PELVIS W/CONTRAST: CPT | Mod: GC | Performed by: RADIOLOGY

## 2025-07-25 RX ORDER — IOPAMIDOL 755 MG/ML
83 INJECTION, SOLUTION INTRAVASCULAR ONCE
Status: COMPLETED | OUTPATIENT
Start: 2025-07-25 | End: 2025-07-25

## 2025-07-25 RX ADMIN — IOPAMIDOL 83 ML: 755 INJECTION, SOLUTION INTRAVASCULAR at 11:18

## 2025-07-25 NOTE — DISCHARGE INSTRUCTIONS

## 2025-07-29 ENCOUNTER — OFFICE VISIT (OUTPATIENT)
Dept: SURGERY | Facility: CLINIC | Age: 40
End: 2025-07-29
Payer: COMMERCIAL

## 2025-07-29 VITALS
HEART RATE: 76 BPM | SYSTOLIC BLOOD PRESSURE: 124 MMHG | BODY MASS INDEX: 20.52 KG/M2 | DIASTOLIC BLOOD PRESSURE: 78 MMHG | HEIGHT: 72 IN | OXYGEN SATURATION: 99 % | WEIGHT: 151.5 LBS

## 2025-07-29 DIAGNOSIS — K43.9 VENTRAL HERNIA WITHOUT OBSTRUCTION OR GANGRENE: Primary | ICD-10-CM

## 2025-07-29 PROCEDURE — 99214 OFFICE O/P EST MOD 30 MIN: CPT | Performed by: SURGERY

## 2025-07-29 ASSESSMENT — PAIN SCALES - GENERAL: PAINLEVEL_OUTOF10: NO PAIN (0)

## 2025-07-29 NOTE — LETTER
2025       RE: Karlos Terrazas  [address] [facility] [address]     Dear Colleague,    Thank you for referring your patient, Karlos Terrazas, to the [facility] COLON AND RECT[facility]GERY [facility]INIC [place] at [facility]. Please see a copy of my visit note below.    Colon and Rectal Surgery Clinic Note    RE: Karlos Terrazas.  : 1985.  TIMMY: 2025.    Reason for visit: hernia.    HPI:  Karlos Terrazas is a 38 year old male who presents today for a hernia. He has a past medical history of anemia. He was diagnosed with moderate to severe Crohn's ileitis (multifocal with stricturing) at the age of 37. Currently on infliximab every 6 weeks. Colonoscopy on 2023 demonstrated terminal ileum stricturing. Surgical Pathology demonstrated active ileitis with granulomas. Rare punctate red lesions with a white center were noted in the left colon. Pathology showed active colitis. MRE on 2023 demonstrated a long segment of acute inflammation of the ileum with associated luminal narrowing. There was no significant change on most recent MRE on 2024. He is s/p laparoscopic right colectomy with KONO-S anastomosis on 24. He had an abdominal US on 2025 for a painless lump which demonstrated a fat-containing midline abdominal wall hernia superior to the umbilicus. Colonoscopy on 2025 demonstrated a patent end-to-side ileo-colonic anastomosis. There was no inflammation seen on exam. CT with very small ventral hernia.          Interval History: Doing well overall, somehow concerned about PE.    Abdominal US (2025):  Findings:   Focused grayscale and color Doppler evaluation of the region of  patient concern approximately 2 cm superior to the umbilicus in region  of prior surgical scar. Within the region of interest. There is a  approximately 1.0 x 0.5 cm defect within the abdominal wall  musculature with herniation of intra-abdominal  fat. No significant  color Doppler signal. No evidence of bowel herniation.                                                          Impression:   Fat-containing midline abdominal wall hernia in region of cervical  scar superior to the umbilicus. No evidence of bowel herniation or  significant color Doppler signal is seen.     Colonoscopy (7/22/2025):  Impression:            - The examined portion of the ileum was normal.                          - Patent end-to-side ileo-colonic anastomosis,                          characterized by healthy appearing mucosa.                          - The distal rectum and anal verge are normal on                          retroflexion view.                          - Crohn's disease, in remission. Inflammation was not                          found on today's exam (based on the endoscopic                          appearance of the mucosa). This was graded as                          Rutgeerts Score i0 (no lesions in the distal ileum),                          in remission compared to previous examinations.                          - No specimens collected.     CT 7/2025:  IMPRESSION:   1. Status post right hemicolectomy and distal ileectomy with KONOS  anastomosis. Patent anastomosis without evidence of postsurgical  complication.  2. No evidence of bowel inflammation.  3. Nonobstructive left nephrolithiasis.    Medications:  Current Outpatient Medications   Medication Sig Dispense Refill     azelastine (ASTELIN) 0.1 % nasal spray Spray 1 spray into both nostrils 2 times daily. 30 mL 4     CALCIUM-VITAMIN D PO Take 1 tablet by mouth daily       carboxymethylcellulose PF (CARBOXYMETHYLCELLULOSE SODIUM) 0.5 % ophthalmic solution Place 1 drop into both eyes 4 times daily Preservative free artificial tears, single use vials. 400 each 11     cetirizine (ZYRTEC) 10 MG tablet Take 10 mg by mouth daily.       diphenhydrAMINE (BENADRYL) 50 MG/ML injection Inject 1 mL (50 mg) over 3-5  "minutes into the vein via push as needed for other (infusion reaction). For RN use only.  Draw up in a syringe and administer IV push.  Discard remainder of vial. 80740 mL 0     Emergency Supply Kit, PIV, Patient use for emergency only. Contents: 3 sodium chloride 0.9% flushes, 1 IV start kit, 1 microclave ext set 14\", 1 each IV Cath 22 G/1\" and 24G/3/4\", 6 alcohol prep pads, 4 nitrile gloves (med). Call [phone] to reorder. [id number] kit 0     EPINEPHrine (ANY BX GENERIC EQUIV) 0.3 MG/0.3ML injection 2-pack Inject 0.3 mLs (0.3 mg) into the muscle as needed for anaphylaxis (infusion reaction). Administer into the mid-thigh in case of severe anaphylaxis (wheezing, throat tightening, mouth swelling, difficulty breathing). May repeat dose one time in 5-15 minutes if symptoms persist. 624045 mL 0     FERROUS BISGLYCINATE CHELATE PO Take 1 tablet by mouth three times a week       inFLIXimab (REMICADE) 100 MG injection Add to infusion 70 mLs (700 mg) once every six weeks. Reconstitute infliximab vial(s).  Draw up infliximab 10 mg/mL in syringe with 21 G needle and add to NaCl 0.9% bag immediately prior to infusing.  MIX GENTLY BY INVERSION, DO NOT SHAKE. Discard remainder of vial(s). 56 each 0     methylPREDNISolone Na Suc, PF, (SOLU-MEDROL) 125 mg/2 mL injection Inject 2 mLs (125 mg) over 3-5 minutes into the vein via push as needed (infusion reaction). For RN use only.  Reconstitute vial. Draw up methylPREDNISolone in a syringe and administer.  Discard remainder of vial. 975536 mL 0     sodium chloride 0.9% bag Infuse 250 mLs over 1 hours into the vein once every six weeks. Add 70 mL (700 mg) of infliximab 10 mg/mL to bag immediately prior to infusing via gravity infusion. When complete, flush bag with 20 mL NS to flush tubing. 788472 mL 0     sodium chloride 0.9% infusion Infuse 500 mLs into the vein as needed for other (infusion reaction). In case of mild reaction, administer via gravity at 20 mL/hr to keep vein " open. In case of severe reaction, administer via gravity wide open on prime setting. 874717 mL 0     sodium chloride, PF, 0.9% PF flush Inject 10 mLs into the vein as needed for other (infusion reaction). For RN use only as needed for infusion reaction 300377 mL 0     sodium chloride, PF, 0.9% PF flush Inject 10 mLs into the vein as needed for line flush. Flush IV before and after medication administration as directed and/or at least every 12 hours. 386553 mL 0     sterile water, preservative free, injection Use 70 mLs for reconstitution once every six weeks. 1. Reconstitute each vial of infliximab with 10 mL of sterile water for injection by slowly injecting 10 mL sterile water down the inside wall of vial w/21 G needle. DO NOT SHAKE. Foaming of the solution on reconstitution is not unusual. Roll and tilt each vial gently.  2. Let drug stand for 5 minutes. 3. Inspect vials for particules and/or discoloration prior to continuing. The solution should be colorless to light yellow and opalescent, and may develop a few translucent particles. DO NOT USE IF DRUG HAS NOT FULLY DISSOLVED OR IF OPAQUE PARTICLES, DISCOLORATION OR OTHER FOREIGN PARTICULES ARE PRESENT. 4. Draw up appropriate dose w/ 21 G needle from vial. 560 mL 0       Allergies:  Allergies   Allergen Reactions     Other Food Allergy Angioedema     carrots       Social history:   Social History     Tobacco Use     Smoking status: Never     Smokeless tobacco: Never   Substance Use Topics     Alcohol use: Not Currently     Comment: 0.5 drinks per week     Marital status: .    ROS:  A complete review of systems was performed with the patient and all systems negative except as per HPI.    Physical Examination:  /78 (BP Location: Left arm, Patient Position: Sitting, Cuff Size: Adult Regular)   Pulse 76   Ht 1.829 m (6')   Wt 68.7 kg (151 lb 8 oz)   SpO2 99%   BMI 20.55 kg/m    General: Well hydrated. No acute distress.  Abdomen: Soft,  NT      ASSESSMENT    This is a 39 year old M with a long stricture at the TI s/p laparoscopic right colectomy with KONO-S anastomosis on 12/5/24, now with very small ventral hernia. Risks, benefits, and alternatives of operative treatment were thoroughly discussed with the patient, he understands these well.    All pertinent labs and imaging were personally reviewed by me.    PLAN  - Observation  - Follow up with GI  - RTC as needed    30 minutes spent on the date of the encounter doing chart review, history and exam, imaging review, documentation and further activities as noted above.      Talat Osorio MD, FACS, FASCRS, FSSO    Division of Colon and Rectal Surgery  [facility]    Referring Provider:  No referring provider defined for this encounter.     Primary Care Provider:  Mary Velasquez      Again, thank you for allowing me to participate in the care of your patient.      Sincerely,    Talat Osorio MD

## 2025-07-29 NOTE — NURSING NOTE
Chief Complaint   Patient presents with    Follow Up     hernia       Vitals:    07/29/25 1405   BP: 124/78   BP Location: Left arm   Patient Position: Sitting   Cuff Size: Adult Regular   Pulse: 76   SpO2: 99%   Weight: 151 lb 8 oz   Height: 6'       Body mass index is 20.55 kg/m .    Gwen Sheikh, EMT

## 2025-08-19 ENCOUNTER — OFFICE VISIT (OUTPATIENT)
Dept: GASTROENTEROLOGY | Facility: CLINIC | Age: 40
End: 2025-08-19
Attending: INTERNAL MEDICINE
Payer: COMMERCIAL

## 2025-08-19 VITALS
HEIGHT: 72 IN | WEIGHT: 151 LBS | HEART RATE: 80 BPM | DIASTOLIC BLOOD PRESSURE: 83 MMHG | OXYGEN SATURATION: 98 % | SYSTOLIC BLOOD PRESSURE: 121 MMHG | BODY MASS INDEX: 20.45 KG/M2

## 2025-08-19 DIAGNOSIS — K50.018 CROHN'S DISEASE OF ILEUM WITH OTHER COMPLICATION (H): Primary | ICD-10-CM

## 2025-08-19 PROCEDURE — 99214 OFFICE O/P EST MOD 30 MIN: CPT | Performed by: PHYSICIAN ASSISTANT

## 2025-08-19 ASSESSMENT — PAIN SCALES - GENERAL: PAINLEVEL_OUTOF10: NO PAIN (0)

## 2025-08-29 ENCOUNTER — HOME INFUSION BILLING (OUTPATIENT)
Dept: HOME HEALTH SERVICES | Facility: HOME HEALTH | Age: 40
End: 2025-08-29
Payer: COMMERCIAL

## 2025-09-02 ENCOUNTER — LAB REQUISITION (OUTPATIENT)
Dept: LAB | Facility: CLINIC | Age: 40
End: 2025-09-02
Payer: COMMERCIAL

## 2025-09-02 ENCOUNTER — HOME CARE VISIT (OUTPATIENT)
Dept: HOME HEALTH SERVICES | Facility: HOME HEALTH | Age: 40
End: 2025-09-02
Payer: COMMERCIAL

## 2025-09-02 VITALS
DIASTOLIC BLOOD PRESSURE: 72 MMHG | TEMPERATURE: 97.3 F | HEART RATE: 78 BPM | OXYGEN SATURATION: 99 % | SYSTOLIC BLOOD PRESSURE: 116 MMHG | RESPIRATION RATE: 16 BRPM

## 2025-09-02 DIAGNOSIS — K50.90 CROHN'S DISEASE, UNSPECIFIED, WITHOUT COMPLICATIONS (H): ICD-10-CM

## 2025-09-02 PROCEDURE — 99601 HOME NFS VISIT <2 HRS: CPT

## 2025-09-02 PROCEDURE — 81599 UNLISTED MAAA: CPT | Performed by: INTERNAL MEDICINE

## (undated) DEVICE — ENDO TROCAR FIRST ENTRY KII FIOS ADV FIX 05X100MM CFF03

## (undated) DEVICE — ENDO TROCAR FIRST ENTRY KII FIOS Z-THRD 05X100MM CTF03

## (undated) DEVICE — LIGHT HANDLE X1 31140133

## (undated) DEVICE — GOWN ISOLATION YELLOW UNIV NOT STERILE 3110PG

## (undated) DEVICE — PREP CHLORAPREP 26ML TINTED HI-LITE ORANGE 930815

## (undated) DEVICE — ENDO TROCAR BLUNT TIP KII BALLOON 12X100MM C0R47

## (undated) DEVICE — Device

## (undated) DEVICE — SPECIMEN CONTAINER 3OZ W/FORMALIN 59901

## (undated) DEVICE — LINEN TOWEL PACK X30 5481

## (undated) DEVICE — SU PDS II 0 CT-1 27" Z340H

## (undated) DEVICE — APPLICATOR LAPAROSCOPIC LAP01-US

## (undated) DEVICE — TUBING SUCTION MEDI-VAC 1/4"X20' N620A

## (undated) DEVICE — SU ETHILON 3-0 PS-1 18" 1663H

## (undated) DEVICE — KIT PATIENT POSITIONING PIGAZZI LATEX FREE 40580

## (undated) DEVICE — DRAIN JACKSON PRATT RESERVOIR 100ML SU130-1305

## (undated) DEVICE — DRAIN JACKSON PRATT 15FR ROUND SU130-1323

## (undated) DEVICE — SNARE CAPIVATOR ROUND COLD SNR BX10 M00561101

## (undated) DEVICE — PROTECTOR ARM ONE-STEP TRENDELENBURG 40418 (COI)

## (undated) DEVICE — ENDO FORCEP BX CAPTURA PRO SPIKE G50696

## (undated) DEVICE — SUCTION MANIFOLD NEPTUNE 2 SYS 1 PORT 702-025-000

## (undated) DEVICE — PAD CHUX UNDERPAD 30X36" P3036C

## (undated) DEVICE — TUBING SUCTION 10'X3/16" N510

## (undated) DEVICE — KIT ENDO FIRST STEP DISINFECTANT 200ML W/POUCH EP-4

## (undated) DEVICE — LINEN TOWEL PACK X6 WHITE 5487

## (undated) DEVICE — SUCTION IRR STRYKERFLOW II W/TIP 250-070-520

## (undated) DEVICE — SUCTION MANIFOLD NEPTUNE 2 SYS 4 PORT 0702-020-000

## (undated) DEVICE — TUBING SMOKE EVAC PNEUMOCLEAR HIGH FLOW 0620050250

## (undated) DEVICE — KIT ENDO TURNOVER/PROCEDURE CARRY-ON 101822

## (undated) DEVICE — SUCTION TIP YANKAUER STR K87

## (undated) DEVICE — SU VICRYL 3-0 SH 27" UND J416H

## (undated) DEVICE — HEMOSTAT HEMOBLAST AGENT L10 SH CANNULA BQF02-US

## (undated) DEVICE — GOWN IMPERVIOUS 2XL BLUE

## (undated) DEVICE — DRAPE SHEET MED 44X70" 9355

## (undated) DEVICE — SU DERMABOND ADVANCED .7ML DNX12

## (undated) DEVICE — ANTIFOG SOLUTION SEE SHARP 150M TROCAR SWABS 30978 (COI)

## (undated) DEVICE — SOL NACL 0.9% INJ 1000ML BAG 2B1324X

## (undated) DEVICE — ESU GROUND PAD ADULT W/CORD E7507

## (undated) DEVICE — NDL INSUFFLATION 13GA 120MM C2201

## (undated) DEVICE — ESU PENCIL W/COATED BLADE E2450H

## (undated) DEVICE — DRAPE LEGGINGS CLEAR 8430

## (undated) DEVICE — STPL RELOAD LINEAR CUT 75MM TCR75

## (undated) DEVICE — ADH SKIN CLOSURE PREMIERPRO EXOFIN 1.0ML 3470

## (undated) DEVICE — SU PDS II 0 CTX 36" Z370T

## (undated) DEVICE — CATH TRAY FOLEY SURESTEP 16FR W/URNE MTR STLK LATEX A303316A

## (undated) DEVICE — STPL LINEAR CUT 75MM TLC75

## (undated) DEVICE — SU SILK 2-0 TIE 12X30" A305H

## (undated) DEVICE — SPONGE LAP 18X18" X8435

## (undated) DEVICE — WIPES FOLEY CARE SURESTEP PROVON DFC100

## (undated) DEVICE — SU MONOCRYL 4-0 PS-2 27" UND Y426H

## (undated) DEVICE — SU PDS II 3-0 SH 27" Z316H

## (undated) DEVICE — SOL WATER IRRIG 1000ML BOTTLE 2F7114

## (undated) DEVICE — SU VICRYL+ 3-0 27IN SH UND VCP416H

## (undated) DEVICE — ENDO TROCAR SLEEVE KII ADV FIXATION 05X100MM CFS02

## (undated) DEVICE — SOL WATER IRRIG 500ML BOTTLE 2F7113

## (undated) DEVICE — ENDO TROCAR SLEEVE KII Z-THREADED 05X100MM CTS02

## (undated) DEVICE — STRAP UNIVERSAL POSITIONING 2-PIECE 4X47X76" 91-287

## (undated) DEVICE — SU VICRYL 3-0 SH 8X18" UND J864D

## (undated) DEVICE — DRAPE IOBAN INCISE 23X17" 6650EZ

## (undated) DEVICE — ESU LIGASURE MARYLAND LAPAROSCOPIC SLR/DVDR 5MMX37CM LF1937

## (undated) DEVICE — DEVICE SUTURE PASSER 14GA WECK EFX EFXSP2

## (undated) DEVICE — SOL WATER IRRIG 3000ML BAG 2B7117

## (undated) DEVICE — SU SILK 0 TIE 6X30" A306H

## (undated) DEVICE — SU VICRYL 0 UR-6 27" J603H

## (undated) DEVICE — SU VICRYL 3-0 SH 27" J316H

## (undated) DEVICE — ESU LIGASURE LAPAROSCOPIC BLUNT TIP SEALER 5MMX44CM LF1844

## (undated) DEVICE — SYR 10ML LL W/O NDL 302995

## (undated) DEVICE — LINEN TOWEL PACK X5 5464

## (undated) RX ORDER — ACETAMINOPHEN 325 MG/1
TABLET ORAL
Status: DISPENSED
Start: 2024-12-05

## (undated) RX ORDER — HYDROMORPHONE HYDROCHLORIDE 1 MG/ML
INJECTION, SOLUTION INTRAMUSCULAR; INTRAVENOUS; SUBCUTANEOUS
Status: DISPENSED
Start: 2024-12-05

## (undated) RX ORDER — FENTANYL CITRATE-0.9 % NACL/PF 10 MCG/ML
PLASTIC BAG, INJECTION (ML) INTRAVENOUS
Status: DISPENSED
Start: 2024-12-05

## (undated) RX ORDER — ONDANSETRON 2 MG/ML
INJECTION INTRAMUSCULAR; INTRAVENOUS
Status: DISPENSED
Start: 2023-02-22

## (undated) RX ORDER — FENTANYL CITRATE 50 UG/ML
INJECTION, SOLUTION INTRAMUSCULAR; INTRAVENOUS
Status: DISPENSED
Start: 2024-12-05

## (undated) RX ORDER — PROPOFOL 10 MG/ML
INJECTION, EMULSION INTRAVENOUS
Status: DISPENSED
Start: 2023-02-22

## (undated) RX ORDER — GLYCOPYRROLATE 0.2 MG/ML
INJECTION, SOLUTION INTRAMUSCULAR; INTRAVENOUS
Status: DISPENSED
Start: 2023-02-22

## (undated) RX ORDER — ONDANSETRON 2 MG/ML
INJECTION INTRAMUSCULAR; INTRAVENOUS
Status: DISPENSED
Start: 2024-12-05

## (undated) RX ORDER — HYDROMORPHONE HCL IN WATER/PF 6 MG/30 ML
PATIENT CONTROLLED ANALGESIA SYRINGE INTRAVENOUS
Status: DISPENSED
Start: 2024-12-05

## (undated) RX ORDER — DEXAMETHASONE SODIUM PHOSPHATE 4 MG/ML
INJECTION, SOLUTION INTRA-ARTICULAR; INTRALESIONAL; INTRAMUSCULAR; INTRAVENOUS; SOFT TISSUE
Status: DISPENSED
Start: 2024-12-05

## (undated) RX ORDER — FENTANYL CITRATE 50 UG/ML
INJECTION, SOLUTION INTRAMUSCULAR; INTRAVENOUS
Status: DISPENSED
Start: 2023-02-22

## (undated) RX ORDER — HEPARIN SODIUM 5000 [USP'U]/.5ML
INJECTION, SOLUTION INTRAVENOUS; SUBCUTANEOUS
Status: DISPENSED
Start: 2024-12-05

## (undated) RX ORDER — SCOLOPAMINE TRANSDERMAL SYSTEM 1 MG/1
PATCH, EXTENDED RELEASE TRANSDERMAL
Status: DISPENSED
Start: 2024-12-05

## (undated) RX ORDER — CEFAZOLIN SODIUM/WATER 2 G/20 ML
SYRINGE (ML) INTRAVENOUS
Status: DISPENSED
Start: 2023-02-22

## (undated) RX ORDER — PROPOFOL 10 MG/ML
INJECTION, EMULSION INTRAVENOUS
Status: DISPENSED
Start: 2024-12-05

## (undated) RX ORDER — METRONIDAZOLE 500 MG/100ML
INJECTION, SOLUTION INTRAVENOUS
Status: DISPENSED
Start: 2024-12-05

## (undated) RX ORDER — SODIUM CHLORIDE, SODIUM LACTATE, POTASSIUM CHLORIDE, CALCIUM CHLORIDE 600; 310; 30; 20 MG/100ML; MG/100ML; MG/100ML; MG/100ML
INJECTION, SOLUTION INTRAVENOUS
Status: DISPENSED
Start: 2024-12-05

## (undated) RX ORDER — DEXAMETHASONE SODIUM PHOSPHATE 4 MG/ML
INJECTION, SOLUTION INTRA-ARTICULAR; INTRALESIONAL; INTRAMUSCULAR; INTRAVENOUS; SOFT TISSUE
Status: DISPENSED
Start: 2023-02-22

## (undated) RX ORDER — CEFAZOLIN SODIUM/WATER 2 G/20 ML
SYRINGE (ML) INTRAVENOUS
Status: DISPENSED
Start: 2024-12-05

## (undated) RX ORDER — BUPIVACAINE HYDROCHLORIDE 2.5 MG/ML
INJECTION, SOLUTION EPIDURAL; INFILTRATION; INTRACAUDAL
Status: DISPENSED
Start: 2024-12-05

## (undated) RX ORDER — BUPIVACAINE HYDROCHLORIDE 2.5 MG/ML
INJECTION, SOLUTION EPIDURAL; INFILTRATION; INTRACAUDAL
Status: DISPENSED
Start: 2023-02-22